# Patient Record
Sex: FEMALE
[De-identification: names, ages, dates, MRNs, and addresses within clinical notes are randomized per-mention and may not be internally consistent; named-entity substitution may affect disease eponyms.]

---

## 2020-01-25 ENCOUNTER — NURSE TRIAGE (OUTPATIENT)
Dept: OTHER | Facility: CLINIC | Age: 65
End: 2020-01-25

## 2020-03-12 ENCOUNTER — NURSE TRIAGE (OUTPATIENT)
Dept: OTHER | Facility: CLINIC | Age: 65
End: 2020-03-12

## 2020-03-12 NOTE — TELEPHONE ENCOUNTER
Reason for Disposition   [1] No CORONAVIRUS EXPOSURE BUT [2] questions about    Protocols used: CORONAVIRUS (2019-NCOV) EXPOSURE-ADULT-AH    Patient has no symptoms, but works as  and comes in contact with many people. She has hx of CHF, A fib, asthmatic bronchitis and a pace maker. She is wanting to discuss if she should avoid working. Discussed she might want to check with her PCP since he knows her best.  But people with underlying medical conditions, the elderly and very young should take every precaution to avoid crowds and ill people.   Good hand hygiene and cough etiquette are a must.

## 2021-07-18 ENCOUNTER — NURSE TRIAGE (OUTPATIENT)
Dept: OTHER | Facility: CLINIC | Age: 66
End: 2021-07-18

## 2021-07-18 NOTE — TELEPHONE ENCOUNTER
Brief description of triage: at 11:45 a.m. patient states that her glucose was 493, she took 13 units of insulin and 23 minutes later her glucose 373, the last reading was at 13:33 361    Triage indicates for patient to call PCP now, patient states that she will do that. Care advice provided, patient verbalizes understanding; denies any other questions or concerns; instructed to call back for any new or worsening symptoms. This triage is a result of a call to [address] [place] [address]. Please do not respond to the triage nurse through this encounter. Any subsequent communication should be directly with the patient. Reason for Disposition   [1] Blood glucose > 300 mg/dL (16.7 mmol/L) AND [2] two or more times in a row    Answer Assessment - Initial Assessment Questions  1. BLOOD GLUCOSE: \"What is your blood glucose level? \"       See above note    2. ONSET: \"When did you check the blood glucose? \"      See above note    3. USUAL RANGE: \"What is your glucose level usually? \" (e.g., usual fasting morning value, usual evening value)      States that it fluctuates, she usually get readings of 120 to 200, sometimes fluctuates over 300    4. KETONES: \"Do you check for ketones (urine or blood test strips)? \" If yes, ask: \"What does the test show now? \"       No    5. TYPE 1 or 2:  \"Do you know what type of diabetes you have? \"  (e.g., Type 1, Type 2, Gestational; doesn't know)       Type 2    6. INSULIN: \"Do you take insulin? \" \"What type of insulin(s) do you use? What is the mode of delivery? (syringe, pen; injection or pump)? \"       Kenalog, sliding scale, pen    7. DIABETES PILLS: \"Do you take any pills for your diabetes? \" If yes, ask: \"Have you missed taking any pills recently? \"      No    8. OTHER SYMPTOMS: \"Do you have any symptoms? \" (e.g., fever, frequent urination, difficulty breathing, dizziness, weakness, vomiting)      Weakness    9. PREGNANCY: \"Is there any chance you are pregnant? \" \"When was your

## 2023-07-28 ENCOUNTER — TRANSCRIBE ORDERS (OUTPATIENT)
Dept: ADMINISTRATIVE | Age: 68
End: 2023-07-28

## 2023-07-28 DIAGNOSIS — R10.9 ACUTE ABDOMINAL PAIN: Primary | ICD-10-CM

## 2023-08-23 ENCOUNTER — HOSPITAL ENCOUNTER (OUTPATIENT)
Dept: CT IMAGING | Age: 68
Discharge: HOME | End: 2023-08-23
Payer: COMMERCIAL

## 2023-08-23 DIAGNOSIS — R10.9 ACUTE ABDOMINAL PAIN: ICD-10-CM

## 2023-08-23 PROCEDURE — 74176 CT ABD & PELVIS W/O CONTRAST: CPT

## 2023-09-13 ENCOUNTER — TRANSCRIBE ORDERS (OUTPATIENT)
Dept: ADMINISTRATIVE | Age: 68
End: 2023-09-13

## 2023-09-13 DIAGNOSIS — N28.89 RENAL MASS: Primary | ICD-10-CM

## 2023-09-27 ENCOUNTER — HOSPITAL ENCOUNTER (INPATIENT)
Age: 68
LOS: 4 days | Discharge: HOME | DRG: 689 | End: 2023-10-02
Attending: EMERGENCY MEDICINE | Admitting: STUDENT IN AN ORGANIZED HEALTH CARE EDUCATION/TRAINING PROGRAM
Payer: MEDICARE

## 2023-09-27 DIAGNOSIS — Z86.19 HISTORY OF ESBL KLEBSIELLA PNEUMONIAE INFECTION: ICD-10-CM

## 2023-09-27 DIAGNOSIS — R10.84 GENERALIZED ABDOMINAL PAIN: ICD-10-CM

## 2023-09-27 DIAGNOSIS — M54.50 BILATERAL LOW BACK PAIN WITHOUT SCIATICA, UNSPECIFIED CHRONICITY: Primary | ICD-10-CM

## 2023-09-27 PROCEDURE — 99285 EMERGENCY DEPT VISIT HI MDM: CPT

## 2023-09-27 PROCEDURE — 96375 TX/PRO/DX INJ NEW DRUG ADDON: CPT

## 2023-09-27 PROCEDURE — 96374 THER/PROPH/DIAG INJ IV PUSH: CPT

## 2023-09-28 ENCOUNTER — APPOINTMENT (OUTPATIENT)
Dept: CT IMAGING | Age: 68
DRG: 689 | End: 2023-09-28
Payer: MEDICARE

## 2023-09-28 ENCOUNTER — APPOINTMENT (OUTPATIENT)
Dept: GENERAL RADIOLOGY | Age: 68
DRG: 689 | End: 2023-09-28
Payer: MEDICARE

## 2023-09-28 PROBLEM — R10.9 ABDOMINAL PAIN: Status: ACTIVE | Noted: 2023-09-28

## 2023-09-28 LAB
ALBUMIN SERPL-MCNC: 4 G/DL (ref 3.4–5)
ALBUMIN/GLOB SERPL: 1.8 {RATIO} (ref 1.1–2.2)
ALP SERPL-CCNC: 176 U/L (ref 40–129)
ALT SERPL-CCNC: 7 U/L (ref 10–40)
ANION GAP SERPL CALCULATED.3IONS-SCNC: 11 MMOL/L (ref 3–16)
ANION GAP SERPL CALCULATED.3IONS-SCNC: 8 MMOL/L (ref 3–16)
AST SERPL-CCNC: 14 U/L (ref 15–37)
BACTERIA URNS QL MICRO: ABNORMAL /HPF
BASOPHILS # BLD: 0 K/UL (ref 0–0.2)
BASOPHILS # BLD: 0 K/UL (ref 0–0.2)
BASOPHILS NFR BLD: 0.6 %
BASOPHILS NFR BLD: 0.7 %
BILIRUB SERPL-MCNC: 0.8 MG/DL (ref 0–1)
BILIRUB UR QL STRIP.AUTO: ABNORMAL
BUN SERPL-MCNC: 16 MG/DL (ref 7–20)
BUN SERPL-MCNC: 17 MG/DL (ref 7–20)
CALCIUM SERPL-MCNC: 9.3 MG/DL (ref 8.3–10.6)
CALCIUM SERPL-MCNC: 9.9 MG/DL (ref 8.3–10.6)
CHLORIDE SERPL-SCNC: 93 MMOL/L (ref 99–110)
CHLORIDE SERPL-SCNC: 95 MMOL/L (ref 99–110)
CLARITY UR: ABNORMAL
CO2 SERPL-SCNC: 33 MMOL/L (ref 21–32)
CO2 SERPL-SCNC: 37 MMOL/L (ref 21–32)
COLOR UR: ABNORMAL
CREAT SERPL-MCNC: 2.5 MG/DL (ref 0.6–1.2)
CREAT SERPL-MCNC: 2.9 MG/DL (ref 0.6–1.2)
CRP SERPL-MCNC: 7.6 MG/L (ref 0–5.1)
DEPRECATED RDW RBC AUTO: 18.6 % (ref 12.4–15.4)
DEPRECATED RDW RBC AUTO: 18.6 % (ref 12.4–15.4)
EOSINOPHIL # BLD: 0.1 K/UL (ref 0–0.6)
EOSINOPHIL # BLD: 0.1 K/UL (ref 0–0.6)
EOSINOPHIL NFR BLD: 1.2 %
EOSINOPHIL NFR BLD: 2.2 %
EPI CELLS #/AREA URNS AUTO: 16 /HPF (ref 0–5)
ERYTHROCYTE [SEDIMENTATION RATE] IN BLOOD BY WESTERGREN METHOD: 4 MM/HR (ref 0–30)
GFR SERPLBLD CREATININE-BSD FMLA CKD-EPI: 17 ML/MIN/{1.73_M2}
GFR SERPLBLD CREATININE-BSD FMLA CKD-EPI: 20 ML/MIN/{1.73_M2}
GLUCOSE BLD-MCNC: 104 MG/DL (ref 70–99)
GLUCOSE BLD-MCNC: 111 MG/DL (ref 70–99)
GLUCOSE BLD-MCNC: 128 MG/DL (ref 70–99)
GLUCOSE BLD-MCNC: 155 MG/DL (ref 70–99)
GLUCOSE BLD-MCNC: 181 MG/DL (ref 70–99)
GLUCOSE SERPL-MCNC: 129 MG/DL (ref 70–99)
GLUCOSE SERPL-MCNC: 142 MG/DL (ref 70–99)
GLUCOSE UR STRIP.AUTO-MCNC: NEGATIVE MG/DL
HCT VFR BLD AUTO: 26.3 % (ref 36–48)
HCT VFR BLD AUTO: 26.8 % (ref 36–48)
HGB BLD-MCNC: 8.2 G/DL (ref 12–16)
HGB BLD-MCNC: 8.5 G/DL (ref 12–16)
HGB UR QL STRIP.AUTO: ABNORMAL
HYALINE CASTS #/AREA URNS AUTO: 10 /LPF (ref 0–8)
KETONES UR STRIP.AUTO-MCNC: ABNORMAL MG/DL
LEUKOCYTE ESTERASE UR QL STRIP.AUTO: ABNORMAL
LIPASE SERPL-CCNC: 11 U/L (ref 13–60)
LYMPHOCYTES # BLD: 0.5 K/UL (ref 1–5.1)
LYMPHOCYTES # BLD: 0.9 K/UL (ref 1–5.1)
LYMPHOCYTES NFR BLD: 10.4 %
LYMPHOCYTES NFR BLD: 20.8 %
MCH RBC QN AUTO: 30.2 PG (ref 26–34)
MCH RBC QN AUTO: 30.3 PG (ref 26–34)
MCHC RBC AUTO-ENTMCNC: 31.3 G/DL (ref 31–36)
MCHC RBC AUTO-ENTMCNC: 31.5 G/DL (ref 31–36)
MCV RBC AUTO: 96 FL (ref 80–100)
MCV RBC AUTO: 96.7 FL (ref 80–100)
MONOCYTES # BLD: 0.3 K/UL (ref 0–1.3)
MONOCYTES # BLD: 0.3 K/UL (ref 0–1.3)
MONOCYTES NFR BLD: 5.3 %
MONOCYTES NFR BLD: 7.2 %
NEUTROPHILS # BLD: 3.1 K/UL (ref 1.7–7.7)
NEUTROPHILS # BLD: 4 K/UL (ref 1.7–7.7)
NEUTROPHILS NFR BLD: 69.1 %
NEUTROPHILS NFR BLD: 82.5 %
NITRITE UR QL STRIP.AUTO: NEGATIVE
PERFORMED ON: ABNORMAL
PH UR STRIP.AUTO: 5 [PH] (ref 5–8)
PLATELET # BLD AUTO: 149 K/UL (ref 135–450)
PLATELET # BLD AUTO: 154 K/UL (ref 135–450)
PMV BLD AUTO: 7.7 FL (ref 5–10.5)
PMV BLD AUTO: 7.9 FL (ref 5–10.5)
POTASSIUM SERPL-SCNC: 4.5 MMOL/L (ref 3.5–5.1)
POTASSIUM SERPL-SCNC: 5.1 MMOL/L (ref 3.5–5.1)
PROT SERPL-MCNC: 6.2 G/DL (ref 6.4–8.2)
PROT UR STRIP.AUTO-MCNC: 300 MG/DL
RBC # BLD AUTO: 2.72 M/UL (ref 4–5.2)
RBC # BLD AUTO: 2.79 M/UL (ref 4–5.2)
RBC CLUMPS #/AREA URNS AUTO: 83 /HPF (ref 0–4)
SODIUM SERPL-SCNC: 137 MMOL/L (ref 136–145)
SODIUM SERPL-SCNC: 140 MMOL/L (ref 136–145)
SP GR UR STRIP.AUTO: 1.02 (ref 1–1.03)
TROPONIN, HIGH SENSITIVITY: 128 NG/L (ref 0–14)
TROPONIN, HIGH SENSITIVITY: 130 NG/L (ref 0–14)
TSH SERPL DL<=0.005 MIU/L-ACNC: 2.7 UIU/ML (ref 0.27–4.2)
UA COMPLETE W REFLEX CULTURE PNL UR: YES
UA DIPSTICK W REFLEX MICRO PNL UR: YES
URN SPEC COLLECT METH UR: ABNORMAL
UROBILINOGEN UR STRIP-ACNC: 1 E.U./DL
WBC # BLD AUTO: 4.5 K/UL (ref 4–11)
WBC # BLD AUTO: 4.8 K/UL (ref 4–11)
WBC #/AREA URNS AUTO: 1403 /HPF (ref 0–5)

## 2023-09-28 PROCEDURE — 6370000000 HC RX 637 (ALT 250 FOR IP): Performed by: NURSE PRACTITIONER

## 2023-09-28 PROCEDURE — 2580000003 HC RX 258: Performed by: EMERGENCY MEDICINE

## 2023-09-28 PROCEDURE — 85652 RBC SED RATE AUTOMATED: CPT

## 2023-09-28 PROCEDURE — 6370000000 HC RX 637 (ALT 250 FOR IP): Performed by: STUDENT IN AN ORGANIZED HEALTH CARE EDUCATION/TRAINING PROGRAM

## 2023-09-28 PROCEDURE — 36415 COLL VENOUS BLD VENIPUNCTURE: CPT

## 2023-09-28 PROCEDURE — 84443 ASSAY THYROID STIM HORMONE: CPT

## 2023-09-28 PROCEDURE — 84484 ASSAY OF TROPONIN QUANT: CPT

## 2023-09-28 PROCEDURE — 02HV33Z INSERTION OF INFUSION DEVICE INTO SUPERIOR VENA CAVA, PERCUTANEOUS APPROACH: ICD-10-PCS | Performed by: STUDENT IN AN ORGANIZED HEALTH CARE EDUCATION/TRAINING PROGRAM

## 2023-09-28 PROCEDURE — 6360000002 HC RX W HCPCS: Performed by: STUDENT IN AN ORGANIZED HEALTH CARE EDUCATION/TRAINING PROGRAM

## 2023-09-28 PROCEDURE — 80053 COMPREHEN METABOLIC PANEL: CPT

## 2023-09-28 PROCEDURE — 93005 ELECTROCARDIOGRAM TRACING: CPT | Performed by: EMERGENCY MEDICINE

## 2023-09-28 PROCEDURE — 81001 URINALYSIS AUTO W/SCOPE: CPT

## 2023-09-28 PROCEDURE — 87086 URINE CULTURE/COLONY COUNT: CPT

## 2023-09-28 PROCEDURE — 87040 BLOOD CULTURE FOR BACTERIA: CPT

## 2023-09-28 PROCEDURE — 6360000002 HC RX W HCPCS: Performed by: EMERGENCY MEDICINE

## 2023-09-28 PROCEDURE — 1200000000 HC SEMI PRIVATE

## 2023-09-28 PROCEDURE — 94760 N-INVAS EAR/PLS OXIMETRY 1: CPT

## 2023-09-28 PROCEDURE — 94640 AIRWAY INHALATION TREATMENT: CPT

## 2023-09-28 PROCEDURE — 85025 COMPLETE CBC W/AUTO DIFF WBC: CPT

## 2023-09-28 PROCEDURE — 86140 C-REACTIVE PROTEIN: CPT

## 2023-09-28 PROCEDURE — 2700000000 HC OXYGEN THERAPY PER DAY

## 2023-09-28 PROCEDURE — 2580000003 HC RX 258: Performed by: STUDENT IN AN ORGANIZED HEALTH CARE EDUCATION/TRAINING PROGRAM

## 2023-09-28 PROCEDURE — 71045 X-RAY EXAM CHEST 1 VIEW: CPT

## 2023-09-28 PROCEDURE — 74176 CT ABD & PELVIS W/O CONTRAST: CPT

## 2023-09-28 PROCEDURE — 83690 ASSAY OF LIPASE: CPT

## 2023-09-28 RX ORDER — OXYCODONE HYDROCHLORIDE AND ACETAMINOPHEN 5; 325 MG/1; MG/1
1 TABLET ORAL EVERY 8 HOURS PRN
Status: DISCONTINUED | OUTPATIENT
Start: 2023-09-28 | End: 2023-10-02 | Stop reason: HOSPADM

## 2023-09-28 RX ORDER — IPRATROPIUM BROMIDE AND ALBUTEROL SULFATE 2.5; .5 MG/3ML; MG/3ML
1 SOLUTION RESPIRATORY (INHALATION) 2 TIMES DAILY
Status: DISCONTINUED | OUTPATIENT
Start: 2023-09-28 | End: 2023-10-02 | Stop reason: HOSPADM

## 2023-09-28 RX ORDER — ACETAMINOPHEN 325 MG/1
650 TABLET ORAL EVERY 6 HOURS PRN
Status: DISCONTINUED | OUTPATIENT
Start: 2023-09-28 | End: 2023-10-02 | Stop reason: HOSPADM

## 2023-09-28 RX ORDER — ALLOPURINOL 100 MG/1
50 TABLET ORAL EVERY OTHER DAY
Status: DISCONTINUED | OUTPATIENT
Start: 2023-09-30 | End: 2023-10-02 | Stop reason: HOSPADM

## 2023-09-28 RX ORDER — FENTANYL CITRATE 50 UG/ML
25 INJECTION, SOLUTION INTRAMUSCULAR; INTRAVENOUS ONCE
Status: COMPLETED | OUTPATIENT
Start: 2023-09-28 | End: 2023-09-28

## 2023-09-28 RX ORDER — FUROSEMIDE 10 MG/ML
40 INJECTION INTRAMUSCULAR; INTRAVENOUS ONCE
Status: COMPLETED | OUTPATIENT
Start: 2023-09-28 | End: 2023-09-28

## 2023-09-28 RX ORDER — ATORVASTATIN CALCIUM 40 MG/1
40 TABLET, FILM COATED ORAL NIGHTLY
Status: DISCONTINUED | OUTPATIENT
Start: 2023-09-28 | End: 2023-10-02 | Stop reason: HOSPADM

## 2023-09-28 RX ORDER — ONDANSETRON 2 MG/ML
4 INJECTION INTRAMUSCULAR; INTRAVENOUS EVERY 6 HOURS PRN
Status: DISCONTINUED | OUTPATIENT
Start: 2023-09-28 | End: 2023-10-02 | Stop reason: HOSPADM

## 2023-09-28 RX ORDER — SODIUM CHLORIDE 9 MG/ML
INJECTION, SOLUTION INTRAVENOUS PRN
Status: DISCONTINUED | OUTPATIENT
Start: 2023-09-28 | End: 2023-10-02 | Stop reason: HOSPADM

## 2023-09-28 RX ORDER — ALBUTEROL SULFATE 90 UG/1
2 AEROSOL, METERED RESPIRATORY (INHALATION) EVERY 6 HOURS PRN
Status: DISCONTINUED | OUTPATIENT
Start: 2023-09-28 | End: 2023-10-02 | Stop reason: HOSPADM

## 2023-09-28 RX ORDER — HEPARIN SODIUM 5000 [USP'U]/ML
5000 INJECTION, SOLUTION INTRAVENOUS; SUBCUTANEOUS EVERY 8 HOURS SCHEDULED
Status: DISCONTINUED | OUTPATIENT
Start: 2023-09-28 | End: 2023-10-02 | Stop reason: HOSPADM

## 2023-09-28 RX ORDER — LACTULOSE 10 G/15ML
20 SOLUTION ORAL 3 TIMES DAILY
Status: COMPLETED | OUTPATIENT
Start: 2023-09-28 | End: 2023-09-28

## 2023-09-28 RX ORDER — POLYETHYLENE GLYCOL 3350 17 G/17G
17 POWDER, FOR SOLUTION ORAL DAILY PRN
Status: DISCONTINUED | OUTPATIENT
Start: 2023-09-28 | End: 2023-09-30

## 2023-09-28 RX ORDER — INSULIN LISPRO 100 [IU]/ML
0-8 INJECTION, SOLUTION INTRAVENOUS; SUBCUTANEOUS
Status: DISCONTINUED | OUTPATIENT
Start: 2023-09-28 | End: 2023-10-02 | Stop reason: HOSPADM

## 2023-09-28 RX ORDER — SODIUM CHLORIDE 0.9 % (FLUSH) 0.9 %
5-40 SYRINGE (ML) INJECTION PRN
Status: DISCONTINUED | OUTPATIENT
Start: 2023-09-28 | End: 2023-10-02 | Stop reason: HOSPADM

## 2023-09-28 RX ORDER — GLUCAGON 1 MG/ML
1 KIT INJECTION PRN
Status: DISCONTINUED | OUTPATIENT
Start: 2023-09-28 | End: 2023-10-02 | Stop reason: HOSPADM

## 2023-09-28 RX ORDER — SODIUM CHLORIDE 0.9 % (FLUSH) 0.9 %
5-40 SYRINGE (ML) INJECTION EVERY 12 HOURS SCHEDULED
Status: DISCONTINUED | OUTPATIENT
Start: 2023-09-28 | End: 2023-10-02 | Stop reason: HOSPADM

## 2023-09-28 RX ORDER — OXYCODONE HYDROCHLORIDE AND ACETAMINOPHEN 5; 325 MG/1; MG/1
1 TABLET ORAL 2 TIMES DAILY
COMMUNITY

## 2023-09-28 RX ORDER — METOLAZONE 2.5 MG/1
5 TABLET ORAL
Status: DISCONTINUED | OUTPATIENT
Start: 2023-09-29 | End: 2023-10-02 | Stop reason: HOSPADM

## 2023-09-28 RX ORDER — ASPIRIN 81 MG/1
81 TABLET, CHEWABLE ORAL DAILY
Status: DISCONTINUED | OUTPATIENT
Start: 2023-09-28 | End: 2023-10-02 | Stop reason: HOSPADM

## 2023-09-28 RX ORDER — ALLOPURINOL 300 MG/1
300 TABLET ORAL DAILY
Status: DISCONTINUED | OUTPATIENT
Start: 2023-09-28 | End: 2023-09-28

## 2023-09-28 RX ORDER — ONDANSETRON 2 MG/ML
4 INJECTION INTRAMUSCULAR; INTRAVENOUS EVERY 6 HOURS PRN
Status: DISCONTINUED | OUTPATIENT
Start: 2023-09-28 | End: 2023-09-28 | Stop reason: HOSPADM

## 2023-09-28 RX ORDER — LANOLIN ALCOHOL/MO/W.PET/CERES
3 CREAM (GRAM) TOPICAL NIGHTLY PRN
Status: DISCONTINUED | OUTPATIENT
Start: 2023-09-28 | End: 2023-10-02 | Stop reason: HOSPADM

## 2023-09-28 RX ORDER — MELOXICAM 7.5 MG/1
15 TABLET ORAL DAILY
COMMUNITY
Start: 2023-09-11

## 2023-09-28 RX ORDER — ALBUTEROL SULFATE 2.5 MG/3ML
SOLUTION RESPIRATORY (INHALATION)
COMMUNITY
Start: 2023-09-21

## 2023-09-28 RX ORDER — ALPRAZOLAM 0.25 MG/1
0.25 TABLET ORAL 3 TIMES DAILY PRN
COMMUNITY
Start: 2023-09-05

## 2023-09-28 RX ORDER — LEVOTHYROXINE SODIUM 0.1 MG/1
100 TABLET ORAL EVERY MORNING
COMMUNITY
Start: 2023-09-23

## 2023-09-28 RX ORDER — LINACLOTIDE 290 UG/1
290 CAPSULE, GELATIN COATED ORAL DAILY
COMMUNITY
Start: 2023-09-06

## 2023-09-28 RX ORDER — DILTIAZEM HYDROCHLORIDE 60 MG/1
2 TABLET, FILM COATED ORAL 2 TIMES DAILY
COMMUNITY
Start: 2023-08-24

## 2023-09-28 RX ORDER — GABAPENTIN 100 MG/1
200 CAPSULE ORAL NIGHTLY
Status: DISCONTINUED | OUTPATIENT
Start: 2023-09-28 | End: 2023-10-02 | Stop reason: HOSPADM

## 2023-09-28 RX ORDER — LISINOPRIL 5 MG/1
2.5 TABLET ORAL DAILY
Status: DISCONTINUED | OUTPATIENT
Start: 2023-09-28 | End: 2023-10-02 | Stop reason: HOSPADM

## 2023-09-28 RX ORDER — DEXTROSE MONOHYDRATE 100 MG/ML
INJECTION, SOLUTION INTRAVENOUS CONTINUOUS PRN
Status: DISCONTINUED | OUTPATIENT
Start: 2023-09-28 | End: 2023-10-02 | Stop reason: HOSPADM

## 2023-09-28 RX ORDER — PANTOPRAZOLE SODIUM 40 MG/1
40 TABLET, DELAYED RELEASE ORAL
Status: DISCONTINUED | OUTPATIENT
Start: 2023-09-28 | End: 2023-10-02 | Stop reason: HOSPADM

## 2023-09-28 RX ORDER — ONDANSETRON 4 MG/1
4 TABLET, ORALLY DISINTEGRATING ORAL EVERY 8 HOURS PRN
Status: DISCONTINUED | OUTPATIENT
Start: 2023-09-28 | End: 2023-10-02 | Stop reason: HOSPADM

## 2023-09-28 RX ORDER — INSULIN LISPRO 100 [IU]/ML
0-4 INJECTION, SOLUTION INTRAVENOUS; SUBCUTANEOUS NIGHTLY
Status: DISCONTINUED | OUTPATIENT
Start: 2023-09-28 | End: 2023-10-02 | Stop reason: HOSPADM

## 2023-09-28 RX ORDER — ACETAMINOPHEN 650 MG/1
650 SUPPOSITORY RECTAL EVERY 6 HOURS PRN
Status: DISCONTINUED | OUTPATIENT
Start: 2023-09-28 | End: 2023-10-02 | Stop reason: HOSPADM

## 2023-09-28 RX ORDER — ALLOPURINOL 300 MG/1
300 TABLET ORAL DAILY
COMMUNITY
Start: 2023-09-05

## 2023-09-28 RX ORDER — LEVOTHYROXINE SODIUM 0.1 MG/1
100 TABLET ORAL DAILY
Status: DISCONTINUED | OUTPATIENT
Start: 2023-09-28 | End: 2023-10-02 | Stop reason: HOSPADM

## 2023-09-28 RX ADMIN — ONDANSETRON 4 MG: 2 INJECTION INTRAMUSCULAR; INTRAVENOUS at 00:23

## 2023-09-28 RX ADMIN — ASPIRIN 81 MG 81 MG: 81 TABLET ORAL at 09:20

## 2023-09-28 RX ADMIN — LACTULOSE 20 G: 20 SOLUTION ORAL at 09:20

## 2023-09-28 RX ADMIN — MELATONIN TAB 3 MG 3 MG: 3 TAB at 22:31

## 2023-09-28 RX ADMIN — Medication 10 ML: at 21:30

## 2023-09-28 RX ADMIN — METOPROLOL TARTRATE 25 MG: 25 TABLET, FILM COATED ORAL at 09:20

## 2023-09-28 RX ADMIN — GABAPENTIN 200 MG: 100 CAPSULE ORAL at 21:29

## 2023-09-28 RX ADMIN — LEVOTHYROXINE SODIUM 100 MCG: 0.1 TABLET ORAL at 09:20

## 2023-09-28 RX ADMIN — FENTANYL CITRATE 25 MCG: 0.05 INJECTION, SOLUTION INTRAMUSCULAR; INTRAVENOUS at 00:23

## 2023-09-28 RX ADMIN — METOPROLOL TARTRATE 25 MG: 25 TABLET, FILM COATED ORAL at 21:29

## 2023-09-28 RX ADMIN — HEPARIN SODIUM 5000 UNITS: 5000 INJECTION INTRAVENOUS; SUBCUTANEOUS at 05:23

## 2023-09-28 RX ADMIN — LACTULOSE 20 G: 20 SOLUTION ORAL at 13:52

## 2023-09-28 RX ADMIN — OXYCODONE AND ACETAMINOPHEN 1 TABLET: 5; 325 TABLET ORAL at 18:40

## 2023-09-28 RX ADMIN — HEPARIN SODIUM 5000 UNITS: 5000 INJECTION INTRAVENOUS; SUBCUTANEOUS at 21:29

## 2023-09-28 RX ADMIN — HEPARIN SODIUM 5000 UNITS: 5000 INJECTION INTRAVENOUS; SUBCUTANEOUS at 13:53

## 2023-09-28 RX ADMIN — FUROSEMIDE 40 MG: 10 INJECTION, SOLUTION INTRAMUSCULAR; INTRAVENOUS at 01:44

## 2023-09-28 RX ADMIN — Medication 10 ML: at 09:21

## 2023-09-28 RX ADMIN — IPRATROPIUM BROMIDE AND ALBUTEROL SULFATE 1 DOSE: .5; 3 SOLUTION RESPIRATORY (INHALATION) at 20:44

## 2023-09-28 RX ADMIN — MEROPENEM 1000 MG: 1 INJECTION, POWDER, FOR SOLUTION INTRAVENOUS at 02:29

## 2023-09-28 RX ADMIN — ALLOPURINOL 300 MG: 300 TABLET ORAL at 09:20

## 2023-09-28 ASSESSMENT — PAIN SCALES - GENERAL
PAINLEVEL_OUTOF10: 5
PAINLEVEL_OUTOF10: 8
PAINLEVEL_OUTOF10: 3
PAINLEVEL_OUTOF10: 5
PAINLEVEL_OUTOF10: 9
PAINLEVEL_OUTOF10: 8

## 2023-09-28 ASSESSMENT — PAIN DESCRIPTION - LOCATION
LOCATION: BACK;ABDOMEN
LOCATION: BACK

## 2023-09-28 ASSESSMENT — PAIN DESCRIPTION - ORIENTATION: ORIENTATION: RIGHT;LOWER;MID

## 2023-09-28 NOTE — PROGRESS NOTES
Tried straight cath pt 2x. Cash Swift RN was able to get a little urine the second time only about 10mL. Sent down to lab. She was in extreme pain when inserting. While pulling out cath there was a little blood at the end and looked cloudy. Urine was dark yellow.

## 2023-09-28 NOTE — ED NOTES
Patient extremely upset about being in [facility]. Pt states that she has been trying to move to another facility near her family in [facility]. Pt would like to speak to social work if admitted. Pt states that she has been attempting to call social work from the previous rehab place she was at, but that woman hasnt called her back.       Millie Allen RN  09/28/23 [id number]

## 2023-09-28 NOTE — PROGRESS NOTES
09/28/23 1353   RT Protocol   History Pulmonary Disease 0   Respiratory pattern 0   Breath sounds 2   Cough 1   Bronchodilator Assessment Score 3

## 2023-09-28 NOTE — PROGRESS NOTES
Nutrition Note    RECOMMENDATIONS  PO Diet: Increased to 4 carb choices to allow for additional meal options in order to promote adequate po intake  ONS: Ordered Ensure Compact BID d/t lower K+ content    NUTRITION ASSESSMENT   Pt triggered for positive nutrition screen indicating wt loss, poor po intake. Upon visiting, pt reported weighed 330 lb in February, current wt of 244 lb. No prior wt hx in EMR to verify reported wt loss. Reported very poor to no appetite during that same timeframe d/t stress and pain, experiencing early satiety and certain smells offsetting. Would like to receive ONS to offer additional nutrition, dislikes Nepro. RD will order ONS and continue to monitor for adequate po intake. Nutrition Related Findings: Lytes obtained today WNL. LBM 9/27, BS+. +2 non-pitting RLE edema. ESRD on HD. Wounds: None  Nutrition Education:  Education not indicated   Nutrition Goals: PO intake 50% or greater, by next RD assessment     MALNUTRITION ASSESSMENT   Chronic Illness  Malnutrition Status: Insufficient data    NUTRITION DIAGNOSIS   Inadequate oral intake related to early satiety, pain, psychological cause or life stress as evidenced by poor intake prior to admission    CURRENT NUTRITION THERAPIES  ADULT DIET; Regular; 3 carb choices (45 gm/meal); Low Potassium (Less than 3000 mg/day)     PO Intake: Unable to assess   PO Supplement Intake:None Ordered    ANTHROPOMETRICS  Current Height: 5' 7.5\" (171.5 cm)  Current Weight - Scale: 244 lb 14.4 oz (111.1 kg)    Admission weight: 244 lb (110.7 kg)  Ideal Body Weight (IBW): 138 lbs  (63 kg)   Weight Adjustment For: Amputation  % Weight Adjustment : 5.9 - BKA  Adjusted Body Weight (Calculated): 258.4 lb  Adjusted BMI (kg/m2) (Calculated): 39.8       BMI: 37.6    COMPARATIVE STANDARDS  Total Energy Requirements (kcals/day): 1112-3555     Protein (g):         Fluid (mL/day):  5088-0869    The patient will be monitored per nutrition standards of care.

## 2023-09-28 NOTE — PROGRESS NOTES
4 Eyes Skin Assessment     NAME:  Sam Donovan  YOB: 1955  MEDICAL RECORD NUMBER:  [id number]    The patient is being assessed for  Admission    I agree that at least one RN has performed a thorough Head to Toe Skin Assessment on the patient. ALL assessment sites listed below have been assessed. Areas assessed by both nurses:    Head, Face, Ears, Shoulders, Back, Chest, Arms, Elbows, Hands, Sacrum. Buttock, Coccyx, Ischium, Legs. Feet and Heels, and Under Medical Devices         Does the Patient have a Wound?  No noted wound(s)       Ede Prevention initiated by RN: Yes  Wound Care Orders initiated by RN: No    Pressure Injury (Stage 3,4, Unstageable, DTI, NWPT, and Complex wounds) if present, place Wound referral order by RN under : No    New Ostomies, if present place, Ostomy referral order under : No     Nurse 1 eSignature: Electronically signed by Yadiel Alexis RN on 9/28/23 at 4:06 AM EDT    **SHARE this note so that the co-signing nurse can place an eSignature**    Nurse 2 eSignature: Electronically signed by Eliazar Fabian RN on 9/28/23 at 5:39 AM EDT

## 2023-09-28 NOTE — PROGRESS NOTES
Put out a call to [facility] to see if we can get the pts paper work and med list sent over via fax.

## 2023-09-28 NOTE — PROGRESS NOTES
Pt arrived via stretcher with ER nurse. We used the lift to get pt into bed. She was on 3L NC, purewick was in place. She did not seem to be in distress. Admission assessment completed. VSS, scheduled meds given/held per MAR orders. Pt does have left lower leg amputation that was done back in February. The right lower leg is slightly red, warm to touch and swollen, MD came to look and ordering labs. She is a dialysis pt and her schedule is M,W,F. Pt is A&Ox4. Pt has no complaints or needs at this time. Brakes locked, bed in lowest position, bed alarm engaged. All belongings and call light within reach. Will call [facility] in the morning to see if we can get her medication leis and about her prosthetic that was left at her apartment.

## 2023-09-28 NOTE — ED PROVIDER NOTES
[facility]        Pt Name: Hoa Butler  MRN: [id number]  [address] 1955  Date of evaluation: 9/27/2023  Provider: Samantha Benjamin MD  PCP: Michael Adams MD  Note Started: 2:18 AM EDT 9/28/23    CHIEF COMPLAINT       Chief Complaint   Patient presents with    Back Pain     Pt brought by  EMS from [facility]) care. Pt hx renal failure, diabetes, COPD. Pt states that her lower back pain has became worse tonight has been hurting today became worse tonight. Pt states has UTI been treated for 4 days now. Pt states dialysis MWF. On 3L O2 baseline       HISTORY OF PRESENT ILLNESS: 1 or more Elements   History From: Patient/EMS        Hoa Butler is a 70 y.o. female who presents for evaluation of lower back pain, abdominal pain, and dysuria. Patient has a history of COPD and end-stage renal disease. She states she has been receiving dialysis normally. She states she is currently at a skilled nursing facility and is being treated for UTI with oral antibiotics. She is not sure what antibiotic she is currently taking. She reports lower thoracic back pain which is progressively worsened. She denies injury or fall. She states there are no remitting or exacerbating factors. She also reports diffuse abdominal discomfort. Associated fevers nausea or vomiting. She did report having persistent dysuria despite taking oral antibiotics for treatment of UTI. Denies any recent instrumentation. Reviewing patient's medical record patient had a urinalysis which grew ESBL resistant Klebsiella on 1/5/2023. Nursing Notes were all reviewed and agreed with or any disagreements were addressed in the HPI. REVIEW OF SYSTEMS :      Review of Systems    Positives and Pertinent negatives as per HPI. SURGICAL HISTORY   No past surgical history on file.     CURRENTMEDICATIONS       Previous Medications    No medications on file       ALLERGIES

## 2023-09-28 NOTE — CONSULTS
MD Francisca Winters MD Yale Rose, MD                Office: [phone]                      Fax: [phone]          NEPHROLOGY INITIAL CONSULT NOTE:     PATIENT NAME: Hoa Butler  : 1955  MRN: [id number]  REASON FOR CONSULT: I am asked to see this patient in consultation for my opinion regarding management of ESRD. My recommendations will be communicated by way of shared medical record. IMPRESSION / RECOMMENDATION:      Admitted on:  2023  For:  Abdominal pain [R10.9]  Generalized abdominal pain [R10.84]  History of ESBL Klebsiella pneumoniae infection [Z86.19]  Bilateral low back pain without sciatica, unspecified chronicity [M54.50]       1. ESRD on iHD: MWF.   - outpt HD center: [facility]. [facility] [id number] [facility] nephrology  - Access: [facility], follow-up outpatient with her nephrologist for further access plans    - s/p HD Wednesday   - next HD Friday     -Kidney cyst  CT scan-  -\"3. Bilateral renal cysts. There is an isodense nodule in the left kidney  measuring 2.4 cm. I can not exclude the possibility of a renal cell  carcinoma. I would recommend follow-up renal mass protocol CT scan or MRI  for further evaluation. CT scan-today-  - 3 cm cystic mass, in the upper right kidney, with 4 cm hypodensity with multiple hypodensity in left kidney, 3 cm unchanged,  -But patient is concerned about that, likely is benign,   - but patient wants further evaluation, will get urology consult for follow-up. 2. Hypertension/Volume Status:  - BP hypertension-okay control continue current plan:   - Na controlled  - EDW follow-up outpatient records   :     - fluid removal to DW      3. Electrolytes/acid-base: controlled    4. Anemia of renal failure: below goal  - Iron: check   - CHRISTIANO:   with HD    5. BMD:  - Phos, calcium - follow in morning labs-     - follow iPTH outpt      :  Other supportive care :   - Check Recommend correlating with urinalysis and follow-up, if indicated Mild-to-moderate bibasilar pleural effusions with bibasilar atelectasis posteriorly which is less prominent. Moderate 3rd spacing of fluid or cellulitis in the subcutaneous soft tissues along the abdominal wall extending inferiorly which is more prominent. Questionable diffuse mild ileus which is more prominent.           ======================================================================  Please note that this chart entry has been generated using voice recognition software, mainly. So please excuse brevity and/or typos. While every effort and attempts have been made to ensure the accuracy of this automated transcription, some errors may have occurred; and certain words and phrases in transcription may not be entered as intended. However, inadvertent computerized transcription errors may be present. So please contact us if any clarification needed.

## 2023-09-28 NOTE — H&P
V2.0  History and Physical      Name:  Bruce Spivey /Age/Sex: 1955  (74 y.o. female)   MRN & CSN:  [id number] & [id number] Encounter Date/Time: 2023 2:16 AM EDT   Location:  [facility]3302/3302-01 PCP: Randi Granados MD       Hospital Day: 2    Assessment and Plan:   Bruce Spivey is a 70 y.o. female with a pmh of ESRD on dialysis, type 2 diabetes, anemia, status post BKA of left leg, CHF, GERD, obesity, COPD, hypothyroidism, MILE on CPAP who presents with Abdominal pain    Hospital Problems             Last Modified POA    * (Principal) Abdominal pain 2023 Yes       Plan:  UTI:  Abdominal pain:  -Patient presented with chief complaint of abdominal pain and dysuria. Mentions that she has history of ESRD on dialysis. She still makes some urine. Reports that she has been getting treatment for UTI in the nursing home. However, it is not helping her dysuria and abdominal pain. -In the ED, patient was hemodynamically stable. UA was positive for large leukocyte esterase. -CT of abdomen and pelvis was done and showed Diffuse moderate bladder wall thickening which may just be due to poor distension with an air-fluid level superiorly which is more apparent and is  probably is due to recent instrumentation of the bladder lumen.   -Patient has history of ESBL  -Urine culture  -Meropenem      2. Constipation:  - Mentions that she has been feeling constipated. She has history of hemorrhoid and abdominal hernia. She denies having fever, chills, nausea, vomiting. Patient mentions that she has tried Senokot but has not helped her with bowel movement.  -Enema      3. Diabetes mellitus:  -Medium dose sliding scale  -Hypoglycemic protocol    4. ESRD on dialysis:  -Monday, Wednesday, Friday  -Patient mentions that her last dialysis was yesterday. 5.  CHF:  -Not in exacerbation  -Strict in and out  -Daily weight    6. MILE:  -On CPAP    7.  Hypothyroidism:  - On levothyroxine        Disposition:   Current soft tissues along the abdominal wall extending inferiorly which is more prominent. Questionable diffuse mild ileus which is more prominent. This note was likely completed using voice recognition technology and may contain unintended errors.      Electronically signed by Gael Ludwig MD on 9/28/2023 at 6:30 AM

## 2023-09-28 NOTE — CARE COORDINATION
Discharge Planning:     (IVETT) in review of chart, Patient is from [facility]. CM placed call to [facility] [phone], [facility] to confirm residency. CM to follow up as needed. Electronically signed by Sebas Mattson on 9/28/2023 at 8:12 AM     CM spoke to Mauri at [facility] she reports patient came there in April, she is LTC. Patient can return with her medicaid, no pre-cert required, and will assist with SN services if needed.      Electronically signed by Sebas Mattson on 9/28/2023 at 9:13 AM

## 2023-09-28 NOTE — PROGRESS NOTES
Hospitalist Progress Note    Patient seen and examined. H&P reviewed. No changes in physical exam. Patient reports that she has been constipated. She did have a small smear of a BM yesterday. She is concerned about not getting her am nebulizer and she reports left flank pain. Up and eating breakfast. No N/V/D. No acute abd pain today. Labs and imaging reviewed. Updates in H&P as follows:       Imaging:  CT AP: 9/27/2023  IMPRESSION:  Cholelithiasis which is unchanged. Mild hepatosplenomegaly which is unchanged. Perirenal scarring and multiple renal cysts which are unchanged with no hydronephrosis or urinary obstruction. Mild ascites in the abdomen which is less prominent and moderate ascites throughout the pelvis which is slightly increased. Diffuse moderate bladder wall thickening which may just be due to poor distension with an air-fluid level superiorly which is more apparent and is probably is due to recent instrumentation of the bladder lumen. Recommend  correlating with urinalysis and follow-up, if indicated     Mild-to-moderate bibasilar pleural effusions with bibasilar atelectasis posteriorly which is less prominent. Moderate 3rd spacing of fluid or cellulitis in the subcutaneous soft tissues along the abdominal wall extending inferiorly which is more prominent. Questionable diffuse mild ileus which is more prominent. Problem List  Principal Problem:    Abdominal pain  Resolved Problems:    * No resolved hospital problems.  *     Assessment and Plan:    Complicated UTI with left flank pain   - Hx of ESBL   - Urine culture is pending   - Blood cx pending   - Continue IV meropenem  ESRD   - CT AP consistent with fluid overload   - MWF HD   - Consult Nephrology   Constipation   - Enema ordered and not administered   - Add lactulose x 2   MILE   - CPAP   Hypothyroidism   - Check TSH   - Continue levothyroxine  Chronic CHF   - Appears fluid up, but compensated   - Fluid

## 2023-09-29 ENCOUNTER — APPOINTMENT (OUTPATIENT)
Dept: ULTRASOUND IMAGING | Age: 68
DRG: 689 | End: 2023-09-29
Payer: MEDICARE

## 2023-09-29 ENCOUNTER — APPOINTMENT (OUTPATIENT)
Dept: GENERAL RADIOLOGY | Age: 68
DRG: 689 | End: 2023-09-29
Payer: MEDICARE

## 2023-09-29 LAB
ANION GAP SERPL CALCULATED.3IONS-SCNC: 7 MMOL/L (ref 3–16)
BACTERIA UR CULT: NORMAL
BASOPHILS # BLD: 0.1 K/UL (ref 0–0.2)
BASOPHILS NFR BLD: 1.1 %
BUN SERPL-MCNC: 24 MG/DL (ref 7–20)
CALCIUM SERPL-MCNC: 9.6 MG/DL (ref 8.3–10.6)
CHLORIDE SERPL-SCNC: 96 MMOL/L (ref 99–110)
CO2 SERPL-SCNC: 35 MMOL/L (ref 21–32)
CREAT SERPL-MCNC: 3.7 MG/DL (ref 0.6–1.2)
DEPRECATED RDW RBC AUTO: 19.3 % (ref 12.4–15.4)
EKG ATRIAL RATE: 71 BPM
EKG DIAGNOSIS: NORMAL
EKG P-R INTERVAL: 278 MS
EKG Q-T INTERVAL: 480 MS
EKG QRS DURATION: 174 MS
EKG QTC CALCULATION (BAZETT): 521 MS
EKG R AXIS: -79 DEGREES
EKG T AXIS: 112 DEGREES
EKG VENTRICULAR RATE: 71 BPM
EOSINOPHIL # BLD: 0.2 K/UL (ref 0–0.6)
EOSINOPHIL NFR BLD: 4.4 %
GFR SERPLBLD CREATININE-BSD FMLA CKD-EPI: 13 ML/MIN/{1.73_M2}
GLUCOSE BLD-MCNC: 102 MG/DL (ref 70–99)
GLUCOSE BLD-MCNC: 133 MG/DL (ref 70–99)
GLUCOSE BLD-MCNC: 146 MG/DL (ref 70–99)
GLUCOSE SERPL-MCNC: 105 MG/DL (ref 70–99)
HCT VFR BLD AUTO: 27.2 % (ref 36–48)
HGB BLD-MCNC: 8.5 G/DL (ref 12–16)
LYMPHOCYTES # BLD: 0.6 K/UL (ref 1–5.1)
LYMPHOCYTES NFR BLD: 12.8 %
MCH RBC QN AUTO: 30.3 PG (ref 26–34)
MCHC RBC AUTO-ENTMCNC: 31.4 G/DL (ref 31–36)
MCV RBC AUTO: 96.6 FL (ref 80–100)
MONOCYTES # BLD: 0.2 K/UL (ref 0–1.3)
MONOCYTES NFR BLD: 5 %
NEUTROPHILS # BLD: 3.6 K/UL (ref 1.7–7.7)
NEUTROPHILS NFR BLD: 76.7 %
PERFORMED ON: ABNORMAL
PLATELET # BLD AUTO: 146 K/UL (ref 135–450)
PMV BLD AUTO: 7.9 FL (ref 5–10.5)
POTASSIUM SERPL-SCNC: 5 MMOL/L (ref 3.5–5.1)
RBC # BLD AUTO: 2.81 M/UL (ref 4–5.2)
SODIUM SERPL-SCNC: 138 MMOL/L (ref 136–145)
WBC # BLD AUTO: 4.7 K/UL (ref 4–11)

## 2023-09-29 PROCEDURE — 76770 US EXAM ABDO BACK WALL COMP: CPT

## 2023-09-29 PROCEDURE — 94761 N-INVAS EAR/PLS OXIMETRY MLT: CPT

## 2023-09-29 PROCEDURE — 74018 RADEX ABDOMEN 1 VIEW: CPT

## 2023-09-29 PROCEDURE — 94640 AIRWAY INHALATION TREATMENT: CPT

## 2023-09-29 PROCEDURE — 93010 ELECTROCARDIOGRAM REPORT: CPT | Performed by: INTERNAL MEDICINE

## 2023-09-29 PROCEDURE — 2580000003 HC RX 258: Performed by: INTERNAL MEDICINE

## 2023-09-29 PROCEDURE — 2700000000 HC OXYGEN THERAPY PER DAY

## 2023-09-29 PROCEDURE — 85025 COMPLETE CBC W/AUTO DIFF WBC: CPT

## 2023-09-29 PROCEDURE — 80048 BASIC METABOLIC PNL TOTAL CA: CPT

## 2023-09-29 PROCEDURE — 90935 HEMODIALYSIS ONE EVALUATION: CPT

## 2023-09-29 PROCEDURE — 36415 COLL VENOUS BLD VENIPUNCTURE: CPT

## 2023-09-29 PROCEDURE — 6370000000 HC RX 637 (ALT 250 FOR IP): Performed by: NURSE PRACTITIONER

## 2023-09-29 PROCEDURE — 6360000002 HC RX W HCPCS: Performed by: INTERNAL MEDICINE

## 2023-09-29 PROCEDURE — 6370000000 HC RX 637 (ALT 250 FOR IP): Performed by: STUDENT IN AN ORGANIZED HEALTH CARE EDUCATION/TRAINING PROGRAM

## 2023-09-29 PROCEDURE — 1200000000 HC SEMI PRIVATE

## 2023-09-29 PROCEDURE — 2580000003 HC RX 258: Performed by: STUDENT IN AN ORGANIZED HEALTH CARE EDUCATION/TRAINING PROGRAM

## 2023-09-29 PROCEDURE — 5A1D70Z PERFORMANCE OF URINARY FILTRATION, INTERMITTENT, LESS THAN 6 HOURS PER DAY: ICD-10-PCS | Performed by: STUDENT IN AN ORGANIZED HEALTH CARE EDUCATION/TRAINING PROGRAM

## 2023-09-29 PROCEDURE — 6360000002 HC RX W HCPCS: Performed by: STUDENT IN AN ORGANIZED HEALTH CARE EDUCATION/TRAINING PROGRAM

## 2023-09-29 RX ADMIN — IPRATROPIUM BROMIDE AND ALBUTEROL SULFATE 1 DOSE: .5; 3 SOLUTION RESPIRATORY (INHALATION) at 07:29

## 2023-09-29 RX ADMIN — HEPARIN SODIUM 5000 UNITS: 5000 INJECTION INTRAVENOUS; SUBCUTANEOUS at 06:24

## 2023-09-29 RX ADMIN — LISINOPRIL 2.5 MG: 5 TABLET ORAL at 12:04

## 2023-09-29 RX ADMIN — OXYCODONE AND ACETAMINOPHEN 1 TABLET: 5; 325 TABLET ORAL at 23:21

## 2023-09-29 RX ADMIN — GABAPENTIN 200 MG: 100 CAPSULE ORAL at 22:02

## 2023-09-29 RX ADMIN — Medication 10 ML: at 22:05

## 2023-09-29 RX ADMIN — ATORVASTATIN CALCIUM 40 MG: 40 TABLET, FILM COATED ORAL at 22:02

## 2023-09-29 RX ADMIN — HEPARIN SODIUM 5000 UNITS: 5000 INJECTION INTRAVENOUS; SUBCUTANEOUS at 22:02

## 2023-09-29 RX ADMIN — METOPROLOL TARTRATE 25 MG: 25 TABLET, FILM COATED ORAL at 22:02

## 2023-09-29 RX ADMIN — LEVOTHYROXINE SODIUM 100 MCG: 0.1 TABLET ORAL at 06:21

## 2023-09-29 RX ADMIN — MEROPENEM 1000 MG: 1 INJECTION, POWDER, FOR SOLUTION INTRAVENOUS at 02:37

## 2023-09-29 RX ADMIN — ASPIRIN 81 MG 81 MG: 81 TABLET ORAL at 12:04

## 2023-09-29 RX ADMIN — METOLAZONE 5 MG: 2.5 TABLET ORAL at 22:02

## 2023-09-29 RX ADMIN — IPRATROPIUM BROMIDE AND ALBUTEROL SULFATE 1 DOSE: .5; 3 SOLUTION RESPIRATORY (INHALATION) at 21:37

## 2023-09-29 RX ADMIN — METOPROLOL TARTRATE 25 MG: 25 TABLET, FILM COATED ORAL at 12:04

## 2023-09-29 RX ADMIN — Medication 10 ML: at 12:36

## 2023-09-29 RX ADMIN — MELATONIN TAB 3 MG 3 MG: 3 TAB at 23:21

## 2023-09-29 RX ADMIN — PANTOPRAZOLE SODIUM 40 MG: 40 TABLET, DELAYED RELEASE ORAL at 06:21

## 2023-09-29 RX ADMIN — SODIUM CHLORIDE: 9 INJECTION, SOLUTION INTRAVENOUS at 02:36

## 2023-09-29 RX ADMIN — HEPARIN SODIUM 5000 UNITS: 5000 INJECTION INTRAVENOUS; SUBCUTANEOUS at 16:02

## 2023-09-29 RX ADMIN — OXYCODONE AND ACETAMINOPHEN 1 TABLET: 5; 325 TABLET ORAL at 12:13

## 2023-09-29 RX ADMIN — OXYCODONE AND ACETAMINOPHEN 1 TABLET: 5; 325 TABLET ORAL at 02:39

## 2023-09-29 ASSESSMENT — PAIN DESCRIPTION - PAIN TYPE: TYPE: CHRONIC PAIN

## 2023-09-29 ASSESSMENT — PAIN SCALES - GENERAL
PAINLEVEL_OUTOF10: 7
PAINLEVEL_OUTOF10: 3
PAINLEVEL_OUTOF10: 6

## 2023-09-29 ASSESSMENT — PAIN DESCRIPTION - ORIENTATION: ORIENTATION: LOWER

## 2023-09-29 ASSESSMENT — PAIN DESCRIPTION - LOCATION: LOCATION: BACK

## 2023-09-29 ASSESSMENT — PAIN DESCRIPTION - DESCRIPTORS: DESCRIPTORS: ACHING

## 2023-09-29 NOTE — PROGRESS NOTES
Hospitalist Progress Note      Name:  Zahira Gao /Age/Sex: 1955  (74 y.o. female)   MRN & CSN:  [id number] & [id number] Encounter Date/Time: 2023 8:10 AM EDT   Location:  [facility]3302/3302-01 PCP: General Nikos MD     [place] [address] Day: 3    Subjective:   Chief Complaint:   Chief Complaint   Patient presents with    Back Pain     Pt brought by [facility] from [facility]) care. Pt hx renal failure, diabetes, COPD. Pt states that her lower back pain has became worse tonight has been hurting today became worse tonight. Pt states has UTI been treated for 4 days now. Pt states dialysis MWF. On 3L O2 baseline     Zahira Gao is a 70 y.o. female with a past medical history of ESRD on HD, DM2, anemia, s/p BKA for left foot infection, CHF, GERD, obesity, COPD, hypothyroidism, MILE on CPAP who presented with abd and left flank pain from [facility] care  Had abnormal u/a and bladder thickening on CTAP, she was admitted for UTI and treated with IV meropenem for hx of ESBL. Interval History: Today, resting in bed today, currently getting HD. Has swelling and abd bloating. She had multiple BMs overnight. Denies CP or SOB. Remains on 2 lpm.  Today she denies pain or flank pain. No fever or chills. Independently reviewed interval ancillary notes from nephrology. Assessment and Recommendations   Problem List  Principal Problem:    Abdominal pain  Resolved Problems:    * No resolved hospital problems. *     Assessment and Plan:    Abnormal U/A  - ?  Complicated UTI with left flank pain              - Hx of ESBL              - Urine culture is NGTD, will follow another day given symptoms               - Blood cx pending              - Continue IV meropenem  ESRD              - CT AP consistent with fluid overload              - MWF HD              - Consult Nephrology   Constipation              - Has had multiple BM overnight   - KUB today   MILE              - CPAP PRN  oxyCODONE-acetaminophen, 1 tablet, Q8H PRN  melatonin, 3 mg, Nightly PRN        Labs and Imaging   Results this Admission:  CT AP: 9/27/2023  IMPRESSION:  Cholelithiasis which is unchanged. Mild hepatosplenomegaly which is unchanged. Perirenal scarring and multiple renal cysts which are unchanged with no hydronephrosis or urinary obstruction. Mild ascites in the abdomen which is less prominent and moderate ascites throughout the pelvis which is slightly increased. Diffuse moderate bladder wall thickening which may just be due to poor distension with an air-fluid level superiorly which is more apparent and is probably is due to recent instrumentation of the bladder lumen. Recommend  correlating with urinalysis and follow-up, if indicated     Mild-to-moderate bibasilar pleural effusions with bibasilar atelectasis posteriorly which is less prominent. Moderate 3rd spacing of fluid or cellulitis in the subcutaneous soft tissues along the abdominal wall extending inferiorly which is more prominent. Questionable diffuse mild ileus which is more prominent. U/A: abnormal    ECG:     Relevant Prior Studies:   Echo: 3/9/2023   Left Ventricle: Normal systolic function with a visually estimated EF   of 60 - 65%. Left Atrium: Left atrium is severely dilated. Right Ventricle: Normal systolic function. TAPSE is 2.28 cm. Tricuspid Valve: Severe pulmonary hypertension with RVSP 71 mmHg. IVC/SVC: IVC diameter is greater than 21 mm and decreases less than 50% during inspiration; therefore the estimated right atrial pressure is elevated (~15 mmHg).    CBC:   Recent Labs     09/28/23  0026 09/28/23  0505 09/29/23  0543   WBC 4.8 4.5 4.7   HGB 8.2* 8.5* 8.5*    154 146     BMP:    Recent Labs     09/28/23  0026 09/28/23  0505 09/29/23  0543    140 138   K 4.5 5.1 5.0   CL 93* 95* 96*   CO2 33* 37* 35*   BUN 16 17 24*   CREATININE 2.5* 2.9* 3.7*   GLUCOSE 142* 129* 105*

## 2023-09-29 NOTE — PROGRESS NOTES
Treatment time: 3.5 hours  Net UF: 2000 ml     Pre weight: 113.5 kg  Post weight:111.5 kg  EDW: 114 kg    Access used: CVC    Access function: good with  ml/min     Medications or blood products given: n/a     Regular outpatient schedule: MWF     Summary of response to treatment: Patient tolerated treatment well and without any complications. Patient remained stable throughout entire treatment. Copy of dialysis treatment record placed in chart, to be scanned into EMR.

## 2023-09-29 NOTE — CONSULTS
[facility] Consult Note  Inpatient Setting - [address] [facility]    Provider: Waqar Mcarthur MD Patient ID:  Admission Date: 2023 Name: Sirisha Lockhart  OR Date: n/a MRN: [id number]   Patient Location: 3AN-3302/3302-01 : 1955  Attending: Suzie Landin MD Date of Service: 2023  PCP: Ninoska Gipson MD     Diagnoses:  1. Bilateral low back pain without sciatica, unspecified chronicity    2. Generalized abdominal pain    3. History of ESBL Klebsiella pneumoniae infection      Pt here for uti and foot infection. Ct shows wen renal cysts and poss complex cysts. No gross hematuria. No renal surg. Dialysis. Assessment/Plan:  Renal failure  Renal masses  Uti    Recc  Start with renal us  Has pacemeaker so mri will be diff    All the patients questions were answered in detail. She understands the plan as listed above. Review/order of labs  Review/order of radiology studies  discussion with referring MD  Decision to obtain old records or supplemental information from family. yes  transferring data from old records into today's office visit record  Independent review and interpretation of test or study   Total time spent face-to-face with the patient 20min, including greater than 50% of this time in discussion with the patient/family concerning the following:  Recommended tests  management options  risks/benefits of management options  importance of compliance  Prognosis  Risk factor reduction  Patient/family education                                                                                                                                                      CC:   Chief Complaint   Patient presents with    Back Pain     Pt brought by [facility] from [facility]. Pt hx renal failure, diabetes, COPD. Pt states that her lower back pain has became worse tonight has been hurting today became worse tonight. Pt states has UTI been treated for 4 days now. Pt states dialysis MWF.  On 3L O2

## 2023-09-29 NOTE — PROGRESS NOTES
pleural effusions with bibasilar atelectasis posteriorly which is less prominent. Moderate 3rd spacing of fluid or cellulitis in the subcutaneous soft tissues along the abdominal wall extending inferiorly which is more prominent. Questionable diffuse mild ileus which is more prominent.           ======================================================================  Please note that this chart entry has been generated using voice recognition software, mainly. So please excuse brevity and/or typos. While every effort and attempts have been made to ensure the accuracy of this automated transcription, some errors may have occurred; and certain words and phrases in transcription may not be entered as intended. However, inadvertent computerized transcription errors may be present. So please contact us if any clarification needed.

## 2023-09-29 NOTE — PROGRESS NOTES
Assessment completed. VSS. Patient awake, alert, and in bed. Medications given per MAR. . No coverage needed. Patient had one episode of loose stool. No complaints of pain or discomfort at this time. Safety and isolation precautions in place. Call light within reach. Will continue to monitor.

## 2023-09-30 LAB
ANION GAP SERPL CALCULATED.3IONS-SCNC: 10 MMOL/L (ref 3–16)
BASOPHILS # BLD: 0 K/UL (ref 0–0.2)
BASOPHILS NFR BLD: 0.9 %
BUN SERPL-MCNC: 16 MG/DL (ref 7–20)
CALCIUM SERPL-MCNC: 9.3 MG/DL (ref 8.3–10.6)
CHLORIDE SERPL-SCNC: 95 MMOL/L (ref 99–110)
CO2 SERPL-SCNC: 26 MMOL/L (ref 21–32)
CREAT SERPL-MCNC: 2.8 MG/DL (ref 0.6–1.2)
DEPRECATED RDW RBC AUTO: 19.2 % (ref 12.4–15.4)
EOSINOPHIL # BLD: 0.2 K/UL (ref 0–0.6)
EOSINOPHIL NFR BLD: 4.6 %
GFR SERPLBLD CREATININE-BSD FMLA CKD-EPI: 18 ML/MIN/{1.73_M2}
GLUCOSE BLD-MCNC: 105 MG/DL (ref 70–99)
GLUCOSE BLD-MCNC: 127 MG/DL (ref 70–99)
GLUCOSE BLD-MCNC: 139 MG/DL (ref 70–99)
GLUCOSE BLD-MCNC: 170 MG/DL (ref 70–99)
GLUCOSE SERPL-MCNC: 93 MG/DL (ref 70–99)
HCT VFR BLD AUTO: 27.9 % (ref 36–48)
HGB BLD-MCNC: 8.7 G/DL (ref 12–16)
LYMPHOCYTES # BLD: 0.7 K/UL (ref 1–5.1)
LYMPHOCYTES NFR BLD: 14.4 %
MCH RBC QN AUTO: 30.3 PG (ref 26–34)
MCHC RBC AUTO-ENTMCNC: 31.1 G/DL (ref 31–36)
MCV RBC AUTO: 97.4 FL (ref 80–100)
MONOCYTES # BLD: 0.3 K/UL (ref 0–1.3)
MONOCYTES NFR BLD: 6.6 %
NEUTROPHILS # BLD: 3.4 K/UL (ref 1.7–7.7)
NEUTROPHILS NFR BLD: 73.5 %
PERFORMED ON: ABNORMAL
PLATELET # BLD AUTO: 135 K/UL (ref 135–450)
PMV BLD AUTO: 8 FL (ref 5–10.5)
POTASSIUM SERPL-SCNC: 4.4 MMOL/L (ref 3.5–5.1)
RBC # BLD AUTO: 2.87 M/UL (ref 4–5.2)
SODIUM SERPL-SCNC: 131 MMOL/L (ref 136–145)
WBC # BLD AUTO: 4.6 K/UL (ref 4–11)

## 2023-09-30 PROCEDURE — 85025 COMPLETE CBC W/AUTO DIFF WBC: CPT

## 2023-09-30 PROCEDURE — 6360000002 HC RX W HCPCS: Performed by: STUDENT IN AN ORGANIZED HEALTH CARE EDUCATION/TRAINING PROGRAM

## 2023-09-30 PROCEDURE — 6370000000 HC RX 637 (ALT 250 FOR IP): Performed by: NURSE PRACTITIONER

## 2023-09-30 PROCEDURE — 2580000003 HC RX 258: Performed by: STUDENT IN AN ORGANIZED HEALTH CARE EDUCATION/TRAINING PROGRAM

## 2023-09-30 PROCEDURE — 36415 COLL VENOUS BLD VENIPUNCTURE: CPT

## 2023-09-30 PROCEDURE — 6370000000 HC RX 637 (ALT 250 FOR IP): Performed by: STUDENT IN AN ORGANIZED HEALTH CARE EDUCATION/TRAINING PROGRAM

## 2023-09-30 PROCEDURE — 2580000003 HC RX 258: Performed by: INTERNAL MEDICINE

## 2023-09-30 PROCEDURE — 2700000000 HC OXYGEN THERAPY PER DAY

## 2023-09-30 PROCEDURE — 1200000000 HC SEMI PRIVATE

## 2023-09-30 PROCEDURE — 94761 N-INVAS EAR/PLS OXIMETRY MLT: CPT

## 2023-09-30 PROCEDURE — 80048 BASIC METABOLIC PNL TOTAL CA: CPT

## 2023-09-30 PROCEDURE — 94640 AIRWAY INHALATION TREATMENT: CPT

## 2023-09-30 PROCEDURE — 36556 INSERT NON-TUNNEL CV CATH: CPT

## 2023-09-30 PROCEDURE — 6370000000 HC RX 637 (ALT 250 FOR IP): Performed by: INTERNAL MEDICINE

## 2023-09-30 PROCEDURE — 6360000002 HC RX W HCPCS: Performed by: INTERNAL MEDICINE

## 2023-09-30 RX ORDER — SENNA AND DOCUSATE SODIUM 50; 8.6 MG/1; MG/1
2 TABLET, FILM COATED ORAL DAILY
Status: DISCONTINUED | OUTPATIENT
Start: 2023-09-30 | End: 2023-10-02 | Stop reason: HOSPADM

## 2023-09-30 RX ORDER — POLYETHYLENE GLYCOL 3350 17 G/17G
17 POWDER, FOR SOLUTION ORAL DAILY
Status: DISCONTINUED | OUTPATIENT
Start: 2023-09-30 | End: 2023-10-02 | Stop reason: HOSPADM

## 2023-09-30 RX ORDER — DIPHENHYDRAMINE HCL 25 MG
25 TABLET ORAL EVERY 8 HOURS PRN
Status: DISCONTINUED | OUTPATIENT
Start: 2023-09-30 | End: 2023-10-02 | Stop reason: HOSPADM

## 2023-09-30 RX ADMIN — LEVOTHYROXINE SODIUM 100 MCG: 0.1 TABLET ORAL at 06:51

## 2023-09-30 RX ADMIN — ATORVASTATIN CALCIUM 40 MG: 40 TABLET, FILM COATED ORAL at 20:37

## 2023-09-30 RX ADMIN — SENNOSIDES AND DOCUSATE SODIUM 2 TABLET: 50; 8.6 TABLET ORAL at 20:37

## 2023-09-30 RX ADMIN — IPRATROPIUM BROMIDE AND ALBUTEROL SULFATE 1 DOSE: .5; 3 SOLUTION RESPIRATORY (INHALATION) at 19:25

## 2023-09-30 RX ADMIN — MEROPENEM 1000 MG: 1 INJECTION, POWDER, FOR SOLUTION INTRAVENOUS at 02:22

## 2023-09-30 RX ADMIN — ALLOPURINOL 50 MG: 100 TABLET ORAL at 09:28

## 2023-09-30 RX ADMIN — Medication 10 ML: at 20:51

## 2023-09-30 RX ADMIN — OXYCODONE AND ACETAMINOPHEN 1 TABLET: 5; 325 TABLET ORAL at 15:50

## 2023-09-30 RX ADMIN — ASPIRIN 81 MG 81 MG: 81 TABLET ORAL at 09:28

## 2023-09-30 RX ADMIN — Medication 10 ML: at 09:29

## 2023-09-30 RX ADMIN — METOPROLOL TARTRATE 25 MG: 25 TABLET, FILM COATED ORAL at 09:28

## 2023-09-30 RX ADMIN — POLYETHYLENE GLYCOL 3350 17 G: 17 POWDER, FOR SOLUTION ORAL at 15:21

## 2023-09-30 RX ADMIN — SODIUM CHLORIDE: 9 INJECTION, SOLUTION INTRAVENOUS at 02:20

## 2023-09-30 RX ADMIN — METOPROLOL TARTRATE 25 MG: 25 TABLET, FILM COATED ORAL at 20:37

## 2023-09-30 RX ADMIN — HEPARIN SODIUM 5000 UNITS: 5000 INJECTION INTRAVENOUS; SUBCUTANEOUS at 20:37

## 2023-09-30 RX ADMIN — HEPARIN SODIUM 5000 UNITS: 5000 INJECTION INTRAVENOUS; SUBCUTANEOUS at 15:21

## 2023-09-30 RX ADMIN — PANTOPRAZOLE SODIUM 40 MG: 40 TABLET, DELAYED RELEASE ORAL at 06:51

## 2023-09-30 RX ADMIN — IPRATROPIUM BROMIDE AND ALBUTEROL SULFATE 1 DOSE: .5; 3 SOLUTION RESPIRATORY (INHALATION) at 07:00

## 2023-09-30 RX ADMIN — LISINOPRIL 2.5 MG: 5 TABLET ORAL at 09:28

## 2023-09-30 RX ADMIN — DIPHENHYDRAMINE HYDROCHLORIDE 25 MG: 25 TABLET ORAL at 23:10

## 2023-09-30 RX ADMIN — HEPARIN SODIUM 5000 UNITS: 5000 INJECTION INTRAVENOUS; SUBCUTANEOUS at 06:51

## 2023-09-30 RX ADMIN — GABAPENTIN 200 MG: 100 CAPSULE ORAL at 20:37

## 2023-09-30 ASSESSMENT — PAIN DESCRIPTION - DESCRIPTORS: DESCRIPTORS: ACHING;DISCOMFORT

## 2023-09-30 ASSESSMENT — PAIN DESCRIPTION - LOCATION: LOCATION: BACK;SHOULDER

## 2023-09-30 ASSESSMENT — PAIN SCALES - GENERAL
PAINLEVEL_OUTOF10: 6
PAINLEVEL_OUTOF10: 8

## 2023-09-30 ASSESSMENT — PAIN - FUNCTIONAL ASSESSMENT: PAIN_FUNCTIONAL_ASSESSMENT: PREVENTS OR INTERFERES SOME ACTIVE ACTIVITIES AND ADLS

## 2023-09-30 ASSESSMENT — PAIN DESCRIPTION - ORIENTATION: ORIENTATION: LOWER;LEFT;RIGHT

## 2023-09-30 NOTE — PROGRESS NOTES
Assessment completed. VSS. Patient awake, alert, and in bed. Medications given per MAR. No complaints of pain or discomfort at this time. Assisted patient with washing her prosthetic gear in hopes that she will be able to get up and out of bed with PT tomorrow. Safety and isolation precautions in place. Call light within reach. Will continue to monitor.

## 2023-09-30 NOTE — PROGRESS NOTES
MD Aliza Salomon MD Vinson Quinton, MD                Office: [phone]                      Fax: [phone]          NEPHROLOGY INPATIENT PROGRESS NOTE:     PATIENT NAME: Salvador Chance  : 1955  MRN: [id number]       IMPRESSION / RECOMMENDATION:      Admitted on:  2023  For:  Abdominal pain [R10.9]  Generalized abdominal pain [R10.84]  History of ESBL Klebsiella pneumoniae infection [Z86.19]  Bilateral low back pain without sciatica, unspecified chronicity [M54.50]       1. ESRD on iHD: MWF.   - outpt HD center: [facility]. [address] [facility] nephrology  - Access: [facility], follow-up outpatient with her nephrologist for further access plans    -s/p HD Friday   \"Net UF: 2000 ml  Pre weight: 113.5 kg  Post weight:111.5 kg  EDW: 114 kg\"    -Next HD Monday,  - attempt to get her below DW ~ 1 kilo , but her BP softer,     -Kidney cyst  CT scan-  -\"3. Bilateral renal cysts. There is an isodense nodule in the left kidney  measuring 2.4 cm. I can not exclude the possibility of a renal cell  carcinoma. I would recommend follow-up renal mass protocol CT scan or MRI  for further evaluation. CT scan- this admission  - 3 cm cystic mass, in the upper right kidney, with 4 cm hypodensity with multiple hypodensity in left kidney, 3 cm unchanged,  -But patient is concerned about that, likely is benign,   - but patient wants further evaluation, so consulted urology for further work up       2. Hypertension/Volume Status:  - BP hypertension-okay control continue current plan:   - Na controlled  - EDW follow-up outpatient records   :     - fluid removal to DW      3. Electrolytes/acid-base: controlled    4. Anemia of renal failure: below goal  - Iron: ,monitor outpt   - CHRISTIANO:   with HD    5. BMD:  - Phos, calcium - follow in morning labs-     - follow iPTH outpt      :  Other supportive care :   - Check daily renal function panel

## 2023-09-30 NOTE — PROGRESS NOTES
Shift assessment completed, VSS, scheduled medications given per MAR, pt set up for breakfast, fresh coffee provided per pt request, pt denies any pain/discomfort at this time. All questions asked/answered. Breaks locked, bed in lowest position and call light within reach.

## 2023-09-30 NOTE — PROGRESS NOTES
Hospitalist Progress Note      Name:  Lewis Girard /Age/Sex: 1955  (74 y.o. female)   MRN & CSN:  [id number] & [id number] Encounter Date/Time: 2023 8:10 AM EDT   Location:  [facility]3302/3302-01 PCP: Erick Freire MD     [place] [address] Day: 4    Subjective:   Chief Complaint:   Chief Complaint   Patient presents with    Back Pain     Pt brought by [facility] from [facility] care. Pt hx renal failure, diabetes, COPD. Pt states that her lower back pain has became worse tonight has been hurting today became worse tonight. Pt states has UTI been treated for 4 days now. Pt states dialysis MWF. On 3L O2 baseline     Lewis Girard is a 70 y.o. female with a past medical history of ESRD on HD, DM2, anemia, s/p BKA for left foot infection, CHF, GERD, obesity, COPD, hypothyroidism, MILE on CPAP who presented with abd and left flank pain from [facility] care  Had abnormal u/a and bladder thickening on CTAP, she was admitted for UTI and treated with IV meropenem for hx of ESBL. Interval History: Today, resting in bed today. She is feeling better     Independently reviewed interval ancillary notes from nephrology. Assessment and Recommendations   Problem List  Principal Problem:    Abdominal pain  Resolved Problems:    * No resolved hospital problems. *     Assessment and Plan:    Abnormal U/A  - ?  Complicated UTI with left flank pain              - Hx of ESBL              - Urine culture is NGTD after 2 days   - Stop IV meropenem, received x 3 days              - Blood cx NGTD  ESRD              - CT AP consistent with fluid overload              - MWF HD              - Consult Nephrology   Constipation              - Has had multiple BM overnight   - KUB with mild stool burden, no ileus or SBO   - Continue Glycolax and add Sennakot daily   MILE              - CPAP   Hypothyroidism              - TSH- 2.7              - Continue levothyroxine  Chronic CHF              - Appears PRN  melatonin, 3 mg, Nightly PRN        Labs and Imaging   Results this Admission:  CT AP: 9/27/2023  IMPRESSION:  Cholelithiasis which is unchanged. Mild hepatosplenomegaly which is unchanged. Perirenal scarring and multiple renal cysts which are unchanged with no hydronephrosis or urinary obstruction. Mild ascites in the abdomen which is less prominent and moderate ascites throughout the pelvis which is slightly increased. Diffuse moderate bladder wall thickening which may just be due to poor distension with an air-fluid level superiorly which is more apparent and is probably is due to recent instrumentation of the bladder lumen. Recommend  correlating with urinalysis and follow-up, if indicated     Mild-to-moderate bibasilar pleural effusions with bibasilar atelectasis posteriorly which is less prominent. Moderate 3rd spacing of fluid or cellulitis in the subcutaneous soft tissues along the abdominal wall extending inferiorly which is more prominent. Questionable diffuse mild ileus which is more prominent. U/A: abnormal    ECG:     Relevant Prior Studies:   Echo: 3/9/2023   Left Ventricle: Normal systolic function with a visually estimated EF   of 60 - 65%. Left Atrium: Left atrium is severely dilated. Right Ventricle: Normal systolic function. TAPSE is 2.28 cm. Tricuspid Valve: Severe pulmonary hypertension with RVSP 71 mmHg. IVC/SVC: IVC diameter is greater than 21 mm and decreases less than 50% during inspiration; therefore the estimated right atrial pressure is elevated (~15 mmHg).    CBC:   Recent Labs     09/28/23  0505 09/29/23  0543 09/30/23  0425   WBC 4.5 4.7 4.6   HGB 8.5* 8.5* 8.7*    146 135       BMP:    Recent Labs     09/28/23  0505 09/29/23  0543 09/30/23  0425    138 131*   K 5.1 5.0 4.4   CL 95* 96* 95*   CO2 37* 35* 26   BUN 17 24* 16   CREATININE 2.9* 3.7* 2.8*   GLUCOSE 129* 105* 93       Hepatic:   Recent Labs     09/28/23  0026

## 2023-10-01 ENCOUNTER — APPOINTMENT (OUTPATIENT)
Dept: GENERAL RADIOLOGY | Age: 68
DRG: 689 | End: 2023-10-01
Payer: MEDICARE

## 2023-10-01 LAB
ANION GAP SERPL CALCULATED.3IONS-SCNC: 9 MMOL/L (ref 3–16)
BUN SERPL-MCNC: 27 MG/DL (ref 7–20)
CALCIUM SERPL-MCNC: 9.6 MG/DL (ref 8.3–10.6)
CHLORIDE SERPL-SCNC: 96 MMOL/L (ref 99–110)
CO2 SERPL-SCNC: 32 MMOL/L (ref 21–32)
CREAT SERPL-MCNC: 3.8 MG/DL (ref 0.6–1.2)
DEPRECATED RDW RBC AUTO: 19.5 % (ref 12.4–15.4)
GFR SERPLBLD CREATININE-BSD FMLA CKD-EPI: 12 ML/MIN/{1.73_M2}
GLUCOSE BLD-MCNC: 101 MG/DL (ref 70–99)
GLUCOSE BLD-MCNC: 113 MG/DL (ref 70–99)
GLUCOSE BLD-MCNC: 143 MG/DL (ref 70–99)
GLUCOSE BLD-MCNC: 89 MG/DL (ref 70–99)
GLUCOSE SERPL-MCNC: 102 MG/DL (ref 70–99)
HCT VFR BLD AUTO: 28.5 % (ref 36–48)
HGB BLD-MCNC: 8.9 G/DL (ref 12–16)
MCH RBC QN AUTO: 30.3 PG (ref 26–34)
MCHC RBC AUTO-ENTMCNC: 31.2 G/DL (ref 31–36)
MCV RBC AUTO: 97 FL (ref 80–100)
PERFORMED ON: ABNORMAL
PERFORMED ON: NORMAL
PLATELET # BLD AUTO: 143 K/UL (ref 135–450)
PMV BLD AUTO: 8.1 FL (ref 5–10.5)
POTASSIUM SERPL-SCNC: 5.2 MMOL/L (ref 3.5–5.1)
RBC # BLD AUTO: 2.94 M/UL (ref 4–5.2)
SODIUM SERPL-SCNC: 137 MMOL/L (ref 136–145)
WBC # BLD AUTO: 4.5 K/UL (ref 4–11)

## 2023-10-01 PROCEDURE — 6360000002 HC RX W HCPCS: Performed by: NURSE PRACTITIONER

## 2023-10-01 PROCEDURE — 6370000000 HC RX 637 (ALT 250 FOR IP): Performed by: STUDENT IN AN ORGANIZED HEALTH CARE EDUCATION/TRAINING PROGRAM

## 2023-10-01 PROCEDURE — 6370000000 HC RX 637 (ALT 250 FOR IP): Performed by: NURSE PRACTITIONER

## 2023-10-01 PROCEDURE — 94761 N-INVAS EAR/PLS OXIMETRY MLT: CPT

## 2023-10-01 PROCEDURE — 2580000003 HC RX 258: Performed by: STUDENT IN AN ORGANIZED HEALTH CARE EDUCATION/TRAINING PROGRAM

## 2023-10-01 PROCEDURE — 85027 COMPLETE CBC AUTOMATED: CPT

## 2023-10-01 PROCEDURE — 1200000000 HC SEMI PRIVATE

## 2023-10-01 PROCEDURE — 71045 X-RAY EXAM CHEST 1 VIEW: CPT

## 2023-10-01 PROCEDURE — 36415 COLL VENOUS BLD VENIPUNCTURE: CPT

## 2023-10-01 PROCEDURE — 2700000000 HC OXYGEN THERAPY PER DAY

## 2023-10-01 PROCEDURE — 80048 BASIC METABOLIC PNL TOTAL CA: CPT

## 2023-10-01 PROCEDURE — 6360000002 HC RX W HCPCS: Performed by: STUDENT IN AN ORGANIZED HEALTH CARE EDUCATION/TRAINING PROGRAM

## 2023-10-01 PROCEDURE — 2580000003 HC RX 258: Performed by: NURSE PRACTITIONER

## 2023-10-01 PROCEDURE — 94640 AIRWAY INHALATION TREATMENT: CPT

## 2023-10-01 RX ORDER — LACTULOSE 10 G/15ML
20 SOLUTION ORAL ONCE
Status: COMPLETED | OUTPATIENT
Start: 2023-10-01 | End: 2023-10-01

## 2023-10-01 RX ADMIN — HEPARIN SODIUM 5000 UNITS: 5000 INJECTION INTRAVENOUS; SUBCUTANEOUS at 06:47

## 2023-10-01 RX ADMIN — MEROPENEM 1000 MG: 1 INJECTION, POWDER, FOR SOLUTION INTRAVENOUS at 14:48

## 2023-10-01 RX ADMIN — POLYETHYLENE GLYCOL 3350 17 G: 17 POWDER, FOR SOLUTION ORAL at 09:04

## 2023-10-01 RX ADMIN — ASPIRIN 81 MG 81 MG: 81 TABLET ORAL at 09:04

## 2023-10-01 RX ADMIN — OXYCODONE AND ACETAMINOPHEN 1 TABLET: 5; 325 TABLET ORAL at 20:49

## 2023-10-01 RX ADMIN — SENNOSIDES AND DOCUSATE SODIUM 2 TABLET: 50; 8.6 TABLET ORAL at 09:02

## 2023-10-01 RX ADMIN — ATORVASTATIN CALCIUM 40 MG: 40 TABLET, FILM COATED ORAL at 20:50

## 2023-10-01 RX ADMIN — Medication 10 ML: at 09:03

## 2023-10-01 RX ADMIN — METOPROLOL TARTRATE 25 MG: 25 TABLET, FILM COATED ORAL at 20:50

## 2023-10-01 RX ADMIN — METOPROLOL TARTRATE 25 MG: 25 TABLET, FILM COATED ORAL at 08:59

## 2023-10-01 RX ADMIN — IPRATROPIUM BROMIDE AND ALBUTEROL SULFATE 1 DOSE: .5; 3 SOLUTION RESPIRATORY (INHALATION) at 19:31

## 2023-10-01 RX ADMIN — GABAPENTIN 200 MG: 100 CAPSULE ORAL at 20:50

## 2023-10-01 RX ADMIN — DIPHENHYDRAMINE HYDROCHLORIDE 25 MG: 25 TABLET ORAL at 20:53

## 2023-10-01 RX ADMIN — PANTOPRAZOLE SODIUM 40 MG: 40 TABLET, DELAYED RELEASE ORAL at 06:47

## 2023-10-01 RX ADMIN — MELATONIN TAB 3 MG 3 MG: 3 TAB at 20:50

## 2023-10-01 RX ADMIN — LEVOTHYROXINE SODIUM 100 MCG: 0.1 TABLET ORAL at 06:47

## 2023-10-01 RX ADMIN — HEPARIN SODIUM 5000 UNITS: 5000 INJECTION INTRAVENOUS; SUBCUTANEOUS at 20:50

## 2023-10-01 RX ADMIN — OXYCODONE AND ACETAMINOPHEN 1 TABLET: 5; 325 TABLET ORAL at 00:45

## 2023-10-01 RX ADMIN — LACTULOSE 20 G: 20 SOLUTION ORAL at 11:07

## 2023-10-01 RX ADMIN — LISINOPRIL 2.5 MG: 5 TABLET ORAL at 09:02

## 2023-10-01 RX ADMIN — HEPARIN SODIUM 5000 UNITS: 5000 INJECTION INTRAVENOUS; SUBCUTANEOUS at 14:48

## 2023-10-01 RX ADMIN — IPRATROPIUM BROMIDE AND ALBUTEROL SULFATE 1 DOSE: .5; 3 SOLUTION RESPIRATORY (INHALATION) at 10:20

## 2023-10-01 RX ADMIN — OXYCODONE AND ACETAMINOPHEN 1 TABLET: 5; 325 TABLET ORAL at 08:58

## 2023-10-01 ASSESSMENT — PAIN DESCRIPTION - LOCATION
LOCATION: BACK

## 2023-10-01 ASSESSMENT — PAIN DESCRIPTION - DESCRIPTORS: DESCRIPTORS: ACHING

## 2023-10-01 ASSESSMENT — PAIN SCALES - GENERAL
PAINLEVEL_OUTOF10: 8
PAINLEVEL_OUTOF10: 7
PAINLEVEL_OUTOF10: 2
PAINLEVEL_OUTOF10: 8
PAINLEVEL_OUTOF10: 5

## 2023-10-01 NOTE — DISCHARGE SUMMARY
[address] [facility]    [facility] Demographics    Patient. Albino Stanley  Date of Birth. 1955  MRN. [id number]     Primary care provider. Jo Carreno MD  (Tel: [phone])    Admit date: 9/27/2023    Discharge date (blank if same as Note Date): Note Date: 10/2/2023     Reason for Hospitalization. Chief Complaint   Patient presents with    Back Pain     Pt brought by [facility] from [facility]) care. Pt hx renal failure, diabetes, COPD. Pt states that her lower back pain has became worse tonight has been hurting today became worse tonight. Pt states has UTI been treated for 4 days now. Pt states dialysis MWF. On 3L O2 baseline       Significant Findings. Principal Problem:    Abdominal pain  Resolved Problems:    * No resolved hospital problems. [facility]. Albino Stanley is a 70 y.o. female with a past medical history of ESRD on HD, DM2, CHB s/p PPM, anemia, s/p BKA for left foot infection, CHF, GERD, obesity, COPD, hypothyroidism, MILE on CPAP who presented with abd and left flank pain from [place] care  Had abnormal u/a and bladder thickening on CTAP, she was admitted for UTI and treated with IV meropenem for hx of ESBL. Treated x 4 days for symptomatic UTI, however urine culture was negative and abx therapy was discontinued. Noted to have worsening cardiomegaly on CXR concerning for pericardial effusion and limited echo was performed that did not show any pericardial effusion. O2 demands at or below baseline. Additional fluid removed in HD 9/29. Nephrology OK with discharge. She plans to return to [facility], however she is interested in transitioning to LTC closer to [place] where she is from.      Assessment and Plan:  Abnormal U/A  - ? UTI with left flank pain              - Hx of ESBL              - Urine culture is NGTD after 2 days              - Received Macrobid x 6 days as OP, Stop IV meropenem, received x 4 days- Caps capsule  Generic drug: linaclotide     meloxicam 7.5 MG tablet  Commonly known as: MOBIC     methocarbamol 500 MG tablet  Commonly known as: ROBAXIN     metoprolol tartrate 25 MG tablet  Commonly known as: LOPRESSOR     oxyCODONE-acetaminophen 5-325 MG per tablet  Commonly known as: PERCOCET     sennosides-docusate sodium 8.6-50 MG tablet  Commonly known as: SENOKOT-S     Symbicort 80-4.5 MCG/ACT Aero  Generic drug: budesonide-formoterol               Where to Get Your Medications        Information about where to get these medications is not yet available    Ask your nurse or doctor about these medications  aspirin 81 MG chewable tablet  atorvastatin 40 MG tablet  melatonin 3 MG Tabs tablet  polyethylene glycol 17 g packet       Spent 35 minutes in discharge process.     Signed:  FIDENCIO Thakur CNP     10/2/2023 1:01 PM

## 2023-10-01 NOTE — PROGRESS NOTES
MD Jimy Garcia MD Laureen Heller, MD                Office: [phone]                      Fax: [phone]          NEPHROLOGY INPATIENT PROGRESS NOTE:     PATIENT NAME: Sirisha Lockhart  : 1955  MRN: [id number]       IMPRESSION / RECOMMENDATION:      Admitted on:  2023  For:  Abdominal pain [R10.9]  Generalized abdominal pain [R10.84]  History of ESBL Klebsiella pneumoniae infection [Z86.19]  Bilateral low back pain without sciatica, unspecified chronicity [M54.50]       1. ESRD on iHD: MWF.   - outpt HD center: [facility]. [address] [facility] nephrology  - Access: [facility], follow-up outpatient with her nephrologist for further access plans    -s/p HD Friday   \"Net UF: 2000 ml  Pre weight: 113.5 kg  Post weight:111.5 kg  EDW: 114 kg\"  -Already below dry weight currently    -Next HD Monday,  - attempt to get her below DW ~ 1 kilo , but her BP softer,     -Kidney cyst  CT scan-  -\"3. Bilateral renal cysts. There is an isodense nodule in the left kidney  measuring 2.4 cm. I can not exclude the possibility of a renal cell  carcinoma. I would recommend follow-up renal mass protocol CT scan or MRI  for further evaluation. CT scan- this admission  - 3 cm cystic mass, in the upper right kidney, with 4 cm hypodensity with multiple hypodensity in left kidney, 3 cm unchanged,  -But patient is concerned about that, likely is benign,   - but patient wants further evaluation, so consulted urology for further work up       2. Hypertension/Volume Status:  - BP hypertension-okay control continue current plan:   - Na controlled  - EDW follow-up outpatient records   :     - fluid removal to DW      3. Electrolytes/acid-base: controlled    4. Anemia of renal failure: below goal  - Iron: ,monitor outpt   - CHRISTIANO:   with HD    5. BMD:  - Phos, calcium - follow in morning labs-     - follow iPTH outpt      :  Other supportive care : air-fluid level superiorly which is more apparent and is probably is due to recent instrumentation of the bladder lumen. Recommend correlating with urinalysis and follow-up, if indicated Mild-to-moderate bibasilar pleural effusions with bibasilar atelectasis posteriorly which is less prominent. Moderate 3rd spacing of fluid or cellulitis in the subcutaneous soft tissues along the abdominal wall extending inferiorly which is more prominent. Questionable diffuse mild ileus which is more prominent.           ======================================================================  Please note that this chart entry has been generated using voice recognition software, mainly. So please excuse brevity and/or typos. While every effort and attempts have been made to ensure the accuracy of this automated transcription, some errors may have occurred; and certain words and phrases in transcription may not be entered as intended. However, inadvertent computerized transcription errors may be present. So please contact us if any clarification needed.

## 2023-10-01 NOTE — PROGRESS NOTES
Hospitalist Progress Note      Name:  Marisela Brittle /Age/Sex: 1955  (74 y.o. female)   MRN & CSN:  [id number] & [id number] Encounter Date/Time: 10/1/2023 8:10 AM EDT   Location:  [facility]3302/3302-01 PCP: Krissy Hernandez MD     [place] [address] Day: 5    Subjective:   Chief Complaint:   Chief Complaint   Patient presents with    Back Pain     Pt brought by [facility] from [facility]) care. Pt hx renal failure, diabetes, COPD. Pt states that her lower back pain has became worse tonight has been hurting today became worse tonight. Pt states has UTI been treated for 4 days now. Pt states dialysis MWF. On 3L O2 baseline     Marisela Brittle is a 70 y.o. female with a past medical history of ESRD on HD, DM2, anemia, s/p BKA for left foot infection, CHF, GERD, obesity, COPD, hypothyroidism, MILE on CPAP who presented with abd and left flank pain from [facility] care  Had abnormal u/a and bladder thickening on CTAP, she was admitted for UTI and treated with IV meropenem for hx of ESBL. Interval History: Today, resting in bed today. She is feeling better, but feels full and more SOB today. Back and abd pain are not recurrent. No N/V/D. Independently reviewed interval ancillary notes from nephrology. Assessment and Recommendations   Problem List  Principal Problem:    Abdominal pain  Resolved Problems:    * No resolved hospital problems.  *     Assessment and Plan:    Abnormal U/A  - ? UTI with left flank pain              - Hx of ESBL              - Urine culture is NGTD after 2 days   - Received Macrobid x 6 days as OP, Stop IV meropenem, received x 4 days- discussed with attending              - Blood cx NGTD  ESRD              - CT AP consistent with fluid overload              - MWF HD              - Consult Nephrology - fluid challenge in HD on Friday with additional fluid removal, stable for discharge   Constipation              - Has had multiple BM overnight   - KUB with mild than 50% during inspiration; therefore the estimated right atrial pressure is elevated (~15 mmHg). CBC:   Recent Labs     09/29/23  0543 09/30/23  0425 10/01/23  0719   WBC 4.7 4.6 4.5   HGB 8.5* 8.7* 8.9*    135 143       BMP:    Recent Labs     09/29/23  0543 09/30/23  0425 10/01/23  0719    131* 137   K 5.0 4.4 5.2*   CL 96* 95* 96*   CO2 35* 26 32   BUN 24* 16 27*   CREATININE 3.7* 2.8* 3.8*   GLUCOSE 105* 93 102*       Hepatic:   No results for input(s): \"AST\", \"ALT\", \"ALB\", \"BILITOT\", \"ALKPHOS\" in the last 72 hours. Lipids: No results found for: \"CHOL\", \"HDL\", \"TRIG\"  Hemoglobin A1C: No results found for: \"LABA1C\"  TSH: No results found for: \"TSH\"  Troponin: No results found for: \"TROPONINT\"  Lactic Acid: No results for input(s): \"LACTA\" in the last 72 hours. BNP: No results for input(s): \"PROBNP\" in the last 72 hours. UA:  Lab Results   Component Value Date/Time    NITRU Negative 09/28/2023 05:17 AM    COLORU DARK YELLOW 09/28/2023 05:17 AM    PHUR 5.0 09/28/2023 05:17 AM    WBCUA 1403 09/28/2023 05:17 AM    RBCUA 83 09/28/2023 05:17 AM    BACTERIA Rare 09/28/2023 05:17 AM    CLARITYU TURBID 09/28/2023 05:17 AM    SPECGRAV 1.020 09/28/2023 05:17 AM    LEUKOCYTESUR LARGE 09/28/2023 05:17 AM    UROBILINOGEN 1.0 09/28/2023 05:17 AM    BILIRUBINUR MODERATE 09/28/2023 05:17 AM    BLOODU MODERATE 09/28/2023 05:17 AM    GLUCOSEU Negative 09/28/2023 05:17 AM    KETUA TRACE 09/28/2023 05:17 AM     Urine Cultures:   Lab Results   Component Value Date/Time    LABURIN No growth at 18 to 36 hours 09/28/2023 05:11 AM     Blood Cultures:   Lab Results   Component Value Date/Time    [facility]  09/28/2023 10:23 AM     No Growth to date. Any change in status will be called. Lab Results   Component Value Date/Time    BLOODCULT2  09/28/2023 10:31 AM     No Growth to date. Any change in status will be called.      Organism: No results found for: \"ORG\"    Personally reviewed labs, diagnostic, device, and imaging

## 2023-10-02 VITALS
HEART RATE: 71 BPM | RESPIRATION RATE: 18 BRPM | BODY MASS INDEX: 39.63 KG/M2 | TEMPERATURE: 97.1 F | WEIGHT: 261.47 LBS | DIASTOLIC BLOOD PRESSURE: 55 MMHG | OXYGEN SATURATION: 100 % | HEIGHT: 68 IN | SYSTOLIC BLOOD PRESSURE: 121 MMHG

## 2023-10-02 LAB
ALBUMIN SERPL-MCNC: 3.9 G/DL (ref 3.4–5)
ANION GAP SERPL CALCULATED.3IONS-SCNC: 12 MMOL/L (ref 3–16)
BACTERIA BLD CULT ORG #2: NORMAL
BACTERIA BLD CULT: NORMAL
BUN SERPL-MCNC: 34 MG/DL (ref 7–20)
CALCIUM SERPL-MCNC: 9.4 MG/DL (ref 8.3–10.6)
CHLORIDE SERPL-SCNC: 94 MMOL/L (ref 99–110)
CHOLEST SERPL-MCNC: 118 MG/DL (ref 0–199)
CO2 SERPL-SCNC: 28 MMOL/L (ref 21–32)
CREAT SERPL-MCNC: 4.4 MG/DL (ref 0.6–1.2)
GFR SERPLBLD CREATININE-BSD FMLA CKD-EPI: 10 ML/MIN/{1.73_M2}
GLUCOSE BLD-MCNC: 101 MG/DL (ref 70–99)
GLUCOSE BLD-MCNC: 128 MG/DL (ref 70–99)
GLUCOSE SERPL-MCNC: 109 MG/DL (ref 70–99)
HBV SURFACE AB SERPL IA-ACNC: 13.43 MIU/ML
HBV SURFACE AG SERPL QL IA: NORMAL
HDLC SERPL-MCNC: 42 MG/DL (ref 40–60)
LDL CHOLESTEROL CALCULATED: 58 MG/DL
PERFORMED ON: ABNORMAL
PERFORMED ON: ABNORMAL
PHOSPHATE SERPL-MCNC: 4.4 MG/DL (ref 2.5–4.9)
POTASSIUM SERPL-SCNC: 5.5 MMOL/L (ref 3.5–5.1)
SODIUM SERPL-SCNC: 134 MMOL/L (ref 136–145)
TRIGL SERPL-MCNC: 89 MG/DL (ref 0–150)
VLDLC SERPL CALC-MCNC: 18 MG/DL

## 2023-10-02 PROCEDURE — 6370000000 HC RX 637 (ALT 250 FOR IP): Performed by: INTERNAL MEDICINE

## 2023-10-02 PROCEDURE — 80069 RENAL FUNCTION PANEL: CPT

## 2023-10-02 PROCEDURE — 90935 HEMODIALYSIS ONE EVALUATION: CPT

## 2023-10-02 PROCEDURE — 2580000003 HC RX 258: Performed by: STUDENT IN AN ORGANIZED HEALTH CARE EDUCATION/TRAINING PROGRAM

## 2023-10-02 PROCEDURE — 94761 N-INVAS EAR/PLS OXIMETRY MLT: CPT

## 2023-10-02 PROCEDURE — 6370000000 HC RX 637 (ALT 250 FOR IP): Performed by: STUDENT IN AN ORGANIZED HEALTH CARE EDUCATION/TRAINING PROGRAM

## 2023-10-02 PROCEDURE — 93308 TTE F-UP OR LMTD: CPT

## 2023-10-02 PROCEDURE — 6360000002 HC RX W HCPCS: Performed by: STUDENT IN AN ORGANIZED HEALTH CARE EDUCATION/TRAINING PROGRAM

## 2023-10-02 PROCEDURE — 6370000000 HC RX 637 (ALT 250 FOR IP): Performed by: NURSE PRACTITIONER

## 2023-10-02 PROCEDURE — 2700000000 HC OXYGEN THERAPY PER DAY

## 2023-10-02 PROCEDURE — 36415 COLL VENOUS BLD VENIPUNCTURE: CPT

## 2023-10-02 PROCEDURE — 86706 HEP B SURFACE ANTIBODY: CPT

## 2023-10-02 PROCEDURE — 87340 HEPATITIS B SURFACE AG IA: CPT

## 2023-10-02 PROCEDURE — 94640 AIRWAY INHALATION TREATMENT: CPT

## 2023-10-02 PROCEDURE — 80061 LIPID PANEL: CPT

## 2023-10-02 RX ORDER — ATORVASTATIN CALCIUM 40 MG/1
40 TABLET, FILM COATED ORAL NIGHTLY
Qty: 30 TABLET | Refills: 3 | DISCHARGE
Start: 2023-10-02

## 2023-10-02 RX ORDER — HEPARIN SODIUM 1000 [USP'U]/ML
3700 INJECTION, SOLUTION INTRAVENOUS; SUBCUTANEOUS
Status: DISCONTINUED | OUTPATIENT
Start: 2023-10-02 | End: 2023-10-02 | Stop reason: HOSPADM

## 2023-10-02 RX ORDER — METHOCARBAMOL 500 MG/1
500 TABLET, FILM COATED ORAL 2 TIMES DAILY
COMMUNITY

## 2023-10-02 RX ORDER — SENNA AND DOCUSATE SODIUM 50; 8.6 MG/1; MG/1
1 TABLET, FILM COATED ORAL DAILY
COMMUNITY

## 2023-10-02 RX ORDER — CETIRIZINE HYDROCHLORIDE 10 MG/1
10 TABLET ORAL DAILY
COMMUNITY

## 2023-10-02 RX ORDER — ASPIRIN 81 MG/1
81 TABLET, CHEWABLE ORAL DAILY
Qty: 30 TABLET | Refills: 3 | DISCHARGE
Start: 2023-10-03

## 2023-10-02 RX ORDER — POLYETHYLENE GLYCOL 3350 17 G/17G
17 POWDER, FOR SOLUTION ORAL DAILY
Qty: 527 G | Refills: 1 | DISCHARGE
Start: 2023-10-03 | End: 2023-12-04

## 2023-10-02 RX ORDER — FERROUS SULFATE 325(65) MG
325 TABLET ORAL
COMMUNITY

## 2023-10-02 RX ORDER — HYDROCORTISONE ACETATE 25 MG/1
25 SUPPOSITORY RECTAL DAILY PRN
COMMUNITY

## 2023-10-02 RX ORDER — LANOLIN ALCOHOL/MO/W.PET/CERES
3 CREAM (GRAM) TOPICAL NIGHTLY PRN
Qty: 30 TABLET | Refills: 0 | DISCHARGE
Start: 2023-10-02

## 2023-10-02 RX ORDER — HYDROXYZINE HYDROCHLORIDE 25 MG/1
25 TABLET, FILM COATED ORAL 3 TIMES DAILY PRN
COMMUNITY

## 2023-10-02 RX ADMIN — DIPHENHYDRAMINE HYDROCHLORIDE 25 MG: 25 TABLET ORAL at 11:04

## 2023-10-02 RX ADMIN — PANTOPRAZOLE SODIUM 40 MG: 40 TABLET, DELAYED RELEASE ORAL at 06:32

## 2023-10-02 RX ADMIN — Medication 10 ML: at 11:05

## 2023-10-02 RX ADMIN — ALLOPURINOL 50 MG: 100 TABLET ORAL at 10:57

## 2023-10-02 RX ADMIN — ASPIRIN 81 MG 81 MG: 81 TABLET ORAL at 10:57

## 2023-10-02 RX ADMIN — IPRATROPIUM BROMIDE AND ALBUTEROL SULFATE 1 DOSE: .5; 3 SOLUTION RESPIRATORY (INHALATION) at 10:40

## 2023-10-02 RX ADMIN — DIPHENHYDRAMINE HYDROCHLORIDE 25 MG: 25 TABLET ORAL at 06:32

## 2023-10-02 RX ADMIN — POLYETHYLENE GLYCOL 3350 17 G: 17 POWDER, FOR SOLUTION ORAL at 10:57

## 2023-10-02 RX ADMIN — SENNOSIDES AND DOCUSATE SODIUM 2 TABLET: 50; 8.6 TABLET ORAL at 10:58

## 2023-10-02 RX ADMIN — LISINOPRIL 2.5 MG: 5 TABLET ORAL at 10:57

## 2023-10-02 RX ADMIN — HEPARIN SODIUM 5000 UNITS: 5000 INJECTION INTRAVENOUS; SUBCUTANEOUS at 06:32

## 2023-10-02 RX ADMIN — METOPROLOL TARTRATE 25 MG: 25 TABLET, FILM COATED ORAL at 10:57

## 2023-10-02 RX ADMIN — LEVOTHYROXINE SODIUM 100 MCG: 0.1 TABLET ORAL at 06:32

## 2023-10-02 RX ADMIN — OXYCODONE AND ACETAMINOPHEN 1 TABLET: 5; 325 TABLET ORAL at 11:04

## 2023-10-02 ASSESSMENT — PAIN DESCRIPTION - PAIN TYPE: TYPE: CHRONIC PAIN

## 2023-10-02 ASSESSMENT — PAIN SCALES - GENERAL
PAINLEVEL_OUTOF10: 8
PAINLEVEL_OUTOF10: 4

## 2023-10-02 ASSESSMENT — PAIN SCALES - WONG BAKER: WONGBAKER_NUMERICALRESPONSE: 2

## 2023-10-02 ASSESSMENT — PAIN DESCRIPTION - LOCATION: LOCATION: BACK

## 2023-10-02 NOTE — PROGRESS NOTES
Upon returning from testing, Morning assessment and medications complete, pt cooperated and tolerated well. Medications given per MAR. VS stable. A&O X4. Patient clean and dry. Tele monitor on. Breakfast tray set up. Bed side table, call light and belongings within reach. Bed locked and in lowest position, bed alarm active and audible. All questions and concerns addressed, no further needs at this time.

## 2023-10-02 NOTE — CARE COORDINATION
Second call put out Dilip Myers [phone] patient's son to let him know that his mother is returning to [facility] this evening. He stated he would like to have a contact number to eventually get his mother placed in a facility closer to [facility], since that is where most of her family resides. Son given two numbers from [phone], and Corrina Lacey [phone]. Dilip Myers also advised that he could speak to the  at [facility] to let them know of his wishes. Son says he is follow up.      Electronically signed by Shawna Ibarra RN on 10/2/2023 at 5:58 PM

## 2023-10-02 NOTE — PROGRESS NOTES
Nutrition Note    RECOMMENDATIONS  PO Diet: Continue current diet   ONS: Discontinue given adequate PO intake   Nutrition Support: None      NUTRITION ASSESSMENT   Pt seen for follow up assessment. Pt with good PO intake, consuming at least 50% of meals. Per nephrology note, pt's EDW is 114 lbs.  lbs. Pt also with hx CHF. Suspect any reported weight loss was likely mostly fluid related. Will discontinue oral nutrition supplement given adequate PO intake. Nutrition Related Findings: Na+ 134. K+ 5.5. +2 non-pitting RLE edema. LBM 9/29. Wounds: None  Nutrition Education:  Education not indicated   Nutrition Goals: PO intake 50% or greater, by next RD assessment     MALNUTRITION ASSESSMENT   Chronic Illness  Malnutrition Status: At risk for malnutrition (Comment)    NUTRITION DIAGNOSIS   No nutrition diagnosis at this time     CURRENT NUTRITION THERAPIES  ADULT DIET; Regular; 4 carb choices (60 gm/meal); Low Potassium (Less than 3000 mg/day)  ADULT ORAL NUTRITION SUPPLEMENT; Breakfast, Dinner; Standard 4 oz Oral Supplement     PO Intake: 26-50%, %   PO Supplement Intake:Unable to assess    ANTHROPOMETRICS  Current Height: 5' 7.5\" (171.5 cm)  Current Weight - Scale: 255 lb 3.2 oz (115.8 kg) (bed zeroed, pt was 244lb 2 days ago)    Admission weight: 244 lb (110.7 kg)  Ideal Body Weight (IBW): 138 lbs  (63 kg)    Usual Bodyweight 251 lb 5.2 oz (114 kg) (EDW)       BMI: 39.4    COMPARATIVE STANDARDS  Total Energy Requirements (kcals/day): 3044-8355     Protein (g):         Fluid (mL/day):  6798-6111 mL    The patient will be monitored per nutrition standards of care. Consult dietitian if additional nutrition interventions are needed prior to RD reassessment.      Chencho Farley [place], [address] [facility],     Contact: [phone]

## 2023-10-02 NOTE — PROGRESS NOTES
Patients head to toe assessment completed. Vital signs WNL. Bed alarm engaged, call light within reach. Scheduled medications given per MAR. Patient complained of back pain ,rates 7/10. PRN Percocet given. Patient resting in bed. Will continue to monitor.

## 2023-10-02 NOTE — PROGRESS NOTES
Treatment time: 3.5 hours    Net UF: 2000 ml    Pre weight: 120.3 kg  Post weight: 118.6  EDW: 114 kg    Access used: R Upper chest wall TDC  Access function: . Access runs without difficulty. Medications or blood products given: none    Regular outpatient schedule: MWF    Summary of response to treatment: Pt. Tolerates treatment well today. Copy of dialysis treatment record placed in chart, to be scanned into EMR.

## 2023-10-02 NOTE — PROGRESS NOTES
Physical/Occupational Therapy  Hoa Better    PT/OT orders received. Pt at HD at this time. Will follow-up as pt status and schedule allow. No charge.    Dhruv Palacio, PT, DPT #[id number]  Daniel Paniagua MOT OTR/L [id number]

## 2023-10-02 NOTE — PROGRESS NOTES
MD Raymundo Mcgill MD Lucia Bhat, MD                Office: [phone]                      Fax: [phone]          NEPHROLOGY INPATIENT PROGRESS NOTE:     PATIENT NAME: Liudmila Richter  : 1955  MRN: [id number]       IMPRESSION / RECOMMENDATION:      Admitted on:  2023  For:  Abdominal pain [R10.9]  Generalized abdominal pain [R10.84]  History of ESBL Klebsiella pneumoniae infection [Z86.19]  Bilateral low back pain without sciatica, unspecified chronicity [M54.50]       1. ESRD on iHD: MWF.   - outpt HD center: [facility]. [address] [facility] nephrology  - Access: [facility], follow-up outpatient with her nephrologist for further access plans    -s/p HD Friday   \"Net UF: 2000 ml  Pre weight: 113.5 kg  Post weight:111.5 kg  EDW: 114 kg\"  -Already below dry weight currently    -Next HD Monday,  -will attempt to get her below DW ~ 1 kilo , but her BP softer,    - if she can tolerate      -Kidney cyst  CT scan-  -\"3. Bilateral renal cysts. There is an isodense nodule in the left kidney  measuring 2.4 cm. I can not exclude the possibility of a renal cell  carcinoma. I would recommend follow-up renal mass protocol CT scan or MRI  for further evaluation. CT scan- this admission  - 3 cm cystic mass, in the upper right kidney, with 4 cm hypodensity with multiple hypodensity in left kidney, 3 cm unchanged,  -But patient is concerned about that, likely is benign,   - but patient wants further evaluation, so consulted urology for further work up       2. Hypertension/Volume Status:  - BP hypertension-okay control continue current plan:   - Na controlled  - EDW follow-up outpatient records   :     - fluid removal to DW      3.  Electrolytes/acid-base:   Hyperkalemia - mild -   Low K bath  Low K diet  On low dose Lisinoril YUSEF blockade now   Fup in am labs after HD  Keep metolazone  as Rx   Outpt can add Lasix non-HD

## 2023-10-02 NOTE — CARE COORDINATION
Patient noted to have a discharge order.   Pt has been medically cleared for transition to [address]    Patient discharged to   [facility] of [address] [place], [address]  Phone: [phone]  Fax: [phone]     HENS Completed:  No, LTC returning LTC    Pre-cert required/obtained:  No, LTC returning [facility] scheduled for 18:30-19:00     Transportation provided by   [employer]   [phone]     AVS faxed and agency notified: yes  To Donna Goel [phone]    The following prescriptions sent with pt: none on the chart    Family Notified:  Yes Elda Jimenez called [phone], VM left and requested a call back    Nurse Faviola Sullivan to call report to facility [phone]

## 2023-10-02 NOTE — CARE COORDINATION
10/02/23 1453   IMM Letter   IMM Letter given to Patient/Family/Significant other/Guardian/POA/by: Letter given to Albino Stanley by Montserrat Champagne RN   IMM Letter date given: 10/02/23   IMM Letter time given: 1454       Electronically signed by Milton Sams RN on 10/2/2023 at 2:54 PM

## 2023-10-11 NOTE — PROGRESS NOTES
Physician Progress Note      PATIENT:               Merline Laming  CSN #:                  [id number]  :                       1955  ADMIT DATE:       2023 11:41 PM  [address] [place] [address] DATE:        10/2/2023 7:43 PM  RESPONDING  PROVIDER #:        Ajith Freeman CNP          QUERY TEXT:    Pt admitted with flank pain. Pt noted to have been treated for UTI for 4 days   prior to admission, still with abnormal UA but negative urine culture. If   possible, please document in progress notes and discharge summary the present   on admission status of UTI:    The medical record reflects the following:  Risk Factors: 70year old female  Clinical Indicators:  ED provider note- She states she is currently at a   skilled nursing facility and is being treated for UTI with oral antibiotics. .. She did report having persistent dysuria despite taking oral antibiotics for   treatment of UTI.  H&P- UTI. .. UA was positive for large leukocyte   esterase. 10/02 d/c summary- Had abnormal u/a and bladder thickening on CTAP,   she was admitted for UTI and treated with IV meropenem for hx of ESBL. Treated x 4 days for symptomatic UTI, however urine culture was negative and   abx therapy was discontinued. Treatment: UA, UC, imaging, IV Merrem  Options provided:  -- Yes, UTI was present at the time of the order to admit to the hospital  -- UTI ruled out after study  -- Other - I will add my own diagnosis  -- Disagree - Not applicable / Not valid  -- Disagree - Clinically unable to determine / Unknown  -- Refer to Clinical Documentation Reviewer    PROVIDER RESPONSE TEXT:    UTI ruled out after study. Query created by:  Maryellen Trevino on 10/5/2023 9:32 AM      Electronically signed by:  Ajith Freeman CNP 10/10/2023 9:49 PM

## 2023-10-27 ENCOUNTER — HOSPITAL ENCOUNTER (INPATIENT)
Age: 68
LOS: 5 days | Discharge: HOME | DRG: 175 | End: 2023-11-02
Attending: EMERGENCY MEDICINE | Admitting: INTERNAL MEDICINE
Payer: MEDICARE

## 2023-10-27 DIAGNOSIS — N18.6 ANEMIA DUE TO CHRONIC KIDNEY DISEASE, ON CHRONIC DIALYSIS (HCC): ICD-10-CM

## 2023-10-27 DIAGNOSIS — M54.9 UPPER BACK PAIN: Primary | ICD-10-CM

## 2023-10-27 DIAGNOSIS — Z98.818 POSTPROCEDURAL HEMORRHAGE DUE TO COMPLICATION OF ORAL SURGERY: ICD-10-CM

## 2023-10-27 DIAGNOSIS — N28.9 HYPERVOLEMIA ASSOCIATED WITH RENAL INSUFFICIENCY: ICD-10-CM

## 2023-10-27 DIAGNOSIS — Z99.2 ANEMIA DUE TO CHRONIC KIDNEY DISEASE, ON CHRONIC DIALYSIS (HCC): ICD-10-CM

## 2023-10-27 DIAGNOSIS — K91.840 POSTPROCEDURAL HEMORRHAGE DUE TO COMPLICATION OF ORAL SURGERY: ICD-10-CM

## 2023-10-27 DIAGNOSIS — I26.99 ACUTE PULMONARY EMBOLISM WITHOUT ACUTE COR PULMONALE, UNSPECIFIED PULMONARY EMBOLISM TYPE (HCC): ICD-10-CM

## 2023-10-27 DIAGNOSIS — D63.1 ANEMIA DUE TO CHRONIC KIDNEY DISEASE, ON CHRONIC DIALYSIS (HCC): ICD-10-CM

## 2023-10-27 DIAGNOSIS — E87.70 HYPERVOLEMIA ASSOCIATED WITH RENAL INSUFFICIENCY: ICD-10-CM

## 2023-10-27 PROCEDURE — 99285 EMERGENCY DEPT VISIT HI MDM: CPT

## 2023-10-27 PROCEDURE — 93005 ELECTROCARDIOGRAM TRACING: CPT | Performed by: EMERGENCY MEDICINE

## 2023-10-27 ASSESSMENT — PAIN - FUNCTIONAL ASSESSMENT: PAIN_FUNCTIONAL_ASSESSMENT: 0-10

## 2023-10-27 ASSESSMENT — PAIN SCALES - GENERAL: PAINLEVEL_OUTOF10: 7

## 2023-10-28 ENCOUNTER — APPOINTMENT (OUTPATIENT)
Dept: GENERAL RADIOLOGY | Age: 68
DRG: 175 | End: 2023-10-28
Payer: MEDICARE

## 2023-10-28 ENCOUNTER — APPOINTMENT (OUTPATIENT)
Dept: CT IMAGING | Age: 68
DRG: 175 | End: 2023-10-28
Payer: MEDICARE

## 2023-10-28 PROBLEM — E11.9 TYPE 2 DIABETES MELLITUS (HCC): Status: ACTIVE | Noted: 2023-10-28

## 2023-10-28 PROBLEM — I26.99 PULMONARY EMBOLUS, LEFT (HCC): Status: ACTIVE | Noted: 2023-10-28

## 2023-10-28 PROBLEM — I10 HTN (HYPERTENSION): Status: ACTIVE | Noted: 2023-10-28

## 2023-10-28 PROBLEM — N18.6 ESRD ON HEMODIALYSIS (HCC): Status: ACTIVE | Noted: 2023-10-28

## 2023-10-28 PROBLEM — Z99.2 ESRD ON HEMODIALYSIS (HCC): Status: ACTIVE | Noted: 2023-10-28

## 2023-10-28 PROBLEM — E87.70 VOLUME OVERLOAD: Status: ACTIVE | Noted: 2023-10-28

## 2023-10-28 LAB
ALBUMIN SERPL-MCNC: 3.8 G/DL (ref 3.4–5)
ALBUMIN/GLOB SERPL: 1.7 {RATIO} (ref 1.1–2.2)
ALP SERPL-CCNC: 156 U/L (ref 40–129)
ALT SERPL-CCNC: 8 U/L (ref 10–40)
ANION GAP SERPL CALCULATED.3IONS-SCNC: 11 MMOL/L (ref 3–16)
ANTI-XA UNFRAC HEPARIN: 0.72 IU/ML (ref 0.3–0.7)
ANTI-XA UNFRAC HEPARIN: <0.1 IU/ML (ref 0.3–0.7)
ANTI-XA UNFRAC HEPARIN: <0.1 IU/ML (ref 0.3–0.7)
APTT BLD: 45.1 SEC (ref 22.7–35.9)
AST SERPL-CCNC: 21 U/L (ref 15–37)
BASOPHILS # BLD: 0 K/UL (ref 0–0.2)
BASOPHILS NFR BLD: 1 %
BILIRUB SERPL-MCNC: 0.9 MG/DL (ref 0–1)
BUN SERPL-MCNC: 17 MG/DL (ref 7–20)
CALCIUM SERPL-MCNC: 8.4 MG/DL (ref 8.3–10.6)
CHLORIDE SERPL-SCNC: 96 MMOL/L (ref 99–110)
CO2 SERPL-SCNC: 27 MMOL/L (ref 21–32)
CREAT SERPL-MCNC: 2.7 MG/DL (ref 0.6–1.2)
D DIMER: 3.36 UG/ML FEU (ref 0–0.6)
DEPRECATED RDW RBC AUTO: 21.4 % (ref 12.4–15.4)
EKG ATRIAL RATE: 66 BPM
EKG DIAGNOSIS: NORMAL
EKG Q-T INTERVAL: 486 MS
EKG QRS DURATION: 182 MS
EKG QTC CALCULATION (BAZETT): 532 MS
EKG R AXIS: -74 DEGREES
EKG T AXIS: 97 DEGREES
EKG VENTRICULAR RATE: 72 BPM
EOSINOPHIL # BLD: 0.1 K/UL (ref 0–0.6)
EOSINOPHIL NFR BLD: 3 %
GFR SERPLBLD CREATININE-BSD FMLA CKD-EPI: 18 ML/MIN/{1.73_M2}
GLUCOSE BLD-MCNC: 114 MG/DL (ref 70–99)
GLUCOSE BLD-MCNC: 125 MG/DL (ref 70–99)
GLUCOSE BLD-MCNC: 153 MG/DL (ref 70–99)
GLUCOSE SERPL-MCNC: 120 MG/DL (ref 70–99)
HCT VFR BLD AUTO: 27.9 % (ref 36–48)
HGB BLD-MCNC: 8.9 G/DL (ref 12–16)
INR PPP: 1.23 (ref 0.84–1.16)
LYMPHOCYTES # BLD: 0.6 K/UL (ref 1–5.1)
LYMPHOCYTES NFR BLD: 14.3 %
MCH RBC QN AUTO: 31 PG (ref 26–34)
MCHC RBC AUTO-ENTMCNC: 32 G/DL (ref 31–36)
MCV RBC AUTO: 96.9 FL (ref 80–100)
MONOCYTES # BLD: 0.3 K/UL (ref 0–1.3)
MONOCYTES NFR BLD: 8 %
NEUTROPHILS # BLD: 3 K/UL (ref 1.7–7.7)
NEUTROPHILS NFR BLD: 73.7 %
NT-PROBNP SERPL-MCNC: ABNORMAL PG/ML (ref 0–124)
PERFORMED ON: ABNORMAL
PLATELET # BLD AUTO: 118 K/UL (ref 135–450)
PMV BLD AUTO: 7.7 FL (ref 5–10.5)
POTASSIUM SERPL-SCNC: 4.5 MMOL/L (ref 3.5–5.1)
PROT SERPL-MCNC: 6 G/DL (ref 6.4–8.2)
PROTHROMBIN TIME: 15.5 SEC (ref 11.5–14.8)
RBC # BLD AUTO: 2.88 M/UL (ref 4–5.2)
REASON FOR REJECTION: NORMAL
REASON FOR REJECTION: NORMAL
REJECTED TEST: NORMAL
REJECTED TEST: NORMAL
SODIUM SERPL-SCNC: 134 MMOL/L (ref 136–145)
TROPONIN, HIGH SENSITIVITY: 160 NG/L (ref 0–14)
TROPONIN, HIGH SENSITIVITY: 168 NG/L (ref 0–14)
WBC # BLD AUTO: 4.1 K/UL (ref 4–11)

## 2023-10-28 PROCEDURE — 83880 ASSAY OF NATRIURETIC PEPTIDE: CPT

## 2023-10-28 PROCEDURE — 6360000002 HC RX W HCPCS: Performed by: EMERGENCY MEDICINE

## 2023-10-28 PROCEDURE — 6370000000 HC RX 637 (ALT 250 FOR IP): Performed by: INTERNAL MEDICINE

## 2023-10-28 PROCEDURE — 6360000004 HC RX CONTRAST MEDICATION: Performed by: NURSE PRACTITIONER

## 2023-10-28 PROCEDURE — 84484 ASSAY OF TROPONIN QUANT: CPT

## 2023-10-28 PROCEDURE — 96365 THER/PROPH/DIAG IV INF INIT: CPT

## 2023-10-28 PROCEDURE — 93010 ELECTROCARDIOGRAM REPORT: CPT | Performed by: INTERNAL MEDICINE

## 2023-10-28 PROCEDURE — 80053 COMPREHEN METABOLIC PANEL: CPT

## 2023-10-28 PROCEDURE — 36556 INSERT NON-TUNNEL CV CATH: CPT

## 2023-10-28 PROCEDURE — 71260 CT THORAX DX C+: CPT

## 2023-10-28 PROCEDURE — 96366 THER/PROPH/DIAG IV INF ADDON: CPT

## 2023-10-28 PROCEDURE — 6360000002 HC RX W HCPCS: Performed by: NURSE PRACTITIONER

## 2023-10-28 PROCEDURE — 85379 FIBRIN DEGRADATION QUANT: CPT

## 2023-10-28 PROCEDURE — 85520 HEPARIN ASSAY: CPT

## 2023-10-28 PROCEDURE — 2700000000 HC OXYGEN THERAPY PER DAY

## 2023-10-28 PROCEDURE — 6370000000 HC RX 637 (ALT 250 FOR IP): Performed by: EMERGENCY MEDICINE

## 2023-10-28 PROCEDURE — 85730 THROMBOPLASTIN TIME PARTIAL: CPT

## 2023-10-28 PROCEDURE — 36415 COLL VENOUS BLD VENIPUNCTURE: CPT

## 2023-10-28 PROCEDURE — 96375 TX/PRO/DX INJ NEW DRUG ADDON: CPT

## 2023-10-28 PROCEDURE — 85025 COMPLETE CBC W/AUTO DIFF WBC: CPT

## 2023-10-28 PROCEDURE — 6370000000 HC RX 637 (ALT 250 FOR IP): Performed by: NURSE PRACTITIONER

## 2023-10-28 PROCEDURE — 2580000003 HC RX 258: Performed by: INTERNAL MEDICINE

## 2023-10-28 PROCEDURE — 1200000000 HC SEMI PRIVATE

## 2023-10-28 PROCEDURE — 94761 N-INVAS EAR/PLS OXIMETRY MLT: CPT

## 2023-10-28 PROCEDURE — 71045 X-RAY EXAM CHEST 1 VIEW: CPT

## 2023-10-28 PROCEDURE — 83036 HEMOGLOBIN GLYCOSYLATED A1C: CPT

## 2023-10-28 PROCEDURE — 94640 AIRWAY INHALATION TREATMENT: CPT

## 2023-10-28 PROCEDURE — 85610 PROTHROMBIN TIME: CPT

## 2023-10-28 RX ORDER — METHOCARBAMOL 500 MG/1
500 TABLET, FILM COATED ORAL 2 TIMES DAILY
Status: DISCONTINUED | OUTPATIENT
Start: 2023-10-28 | End: 2023-11-02 | Stop reason: HOSPADM

## 2023-10-28 RX ORDER — LEVOTHYROXINE SODIUM 0.1 MG/1
100 TABLET ORAL EVERY MORNING
Status: DISCONTINUED | OUTPATIENT
Start: 2023-10-28 | End: 2023-11-02 | Stop reason: HOSPADM

## 2023-10-28 RX ORDER — ASPIRIN 81 MG/1
81 TABLET, CHEWABLE ORAL DAILY
Status: DISCONTINUED | OUTPATIENT
Start: 2023-10-28 | End: 2023-11-02 | Stop reason: HOSPADM

## 2023-10-28 RX ORDER — HYDROCORTISONE ACETATE 25 MG/1
25 SUPPOSITORY RECTAL DAILY PRN
Status: DISCONTINUED | OUTPATIENT
Start: 2023-10-28 | End: 2023-11-02 | Stop reason: HOSPADM

## 2023-10-28 RX ORDER — CLINDAMYCIN HYDROCHLORIDE 300 MG/1
300 CAPSULE ORAL 3 TIMES DAILY
Qty: 21 CAPSULE | Refills: 0 | Status: ON HOLD | COMMUNITY
Start: 2023-10-26 | End: 2023-11-02 | Stop reason: HOSPADM

## 2023-10-28 RX ORDER — POLYETHYLENE GLYCOL 3350 17 G/17G
17 POWDER, FOR SOLUTION ORAL DAILY
Status: DISCONTINUED | OUTPATIENT
Start: 2023-10-28 | End: 2023-11-02 | Stop reason: HOSPADM

## 2023-10-28 RX ORDER — ALBUTEROL SULFATE 2.5 MG/3ML
2.5 SOLUTION RESPIRATORY (INHALATION)
Status: DISCONTINUED | OUTPATIENT
Start: 2023-10-29 | End: 2023-11-02 | Stop reason: HOSPADM

## 2023-10-28 RX ORDER — DEXTROSE MONOHYDRATE 100 MG/ML
INJECTION, SOLUTION INTRAVENOUS CONTINUOUS PRN
Status: DISCONTINUED | OUTPATIENT
Start: 2023-10-28 | End: 2023-11-02 | Stop reason: HOSPADM

## 2023-10-28 RX ORDER — SODIUM CHLORIDE 0.9 % (FLUSH) 0.9 %
5-40 SYRINGE (ML) INJECTION PRN
Status: DISCONTINUED | OUTPATIENT
Start: 2023-10-28 | End: 2023-11-02 | Stop reason: HOSPADM

## 2023-10-28 RX ORDER — ALBUTEROL SULFATE 2.5 MG/3ML
2.5 SOLUTION RESPIRATORY (INHALATION) EVERY 6 HOURS PRN
Status: DISCONTINUED | OUTPATIENT
Start: 2023-10-28 | End: 2023-10-28

## 2023-10-28 RX ORDER — HEPARIN SODIUM 1000 [USP'U]/ML
40 INJECTION, SOLUTION INTRAVENOUS; SUBCUTANEOUS PRN
Status: DISCONTINUED | OUTPATIENT
Start: 2023-10-28 | End: 2023-10-30

## 2023-10-28 RX ORDER — GABAPENTIN 100 MG/1
100 CAPSULE ORAL
COMMUNITY
Start: 2023-09-16

## 2023-10-28 RX ORDER — LIDOCAINE 4 G/G
1 PATCH TOPICAL DAILY
Status: DISCONTINUED | OUTPATIENT
Start: 2023-10-28 | End: 2023-11-02 | Stop reason: HOSPADM

## 2023-10-28 RX ORDER — ACETAMINOPHEN 650 MG/1
650 SUPPOSITORY RECTAL EVERY 6 HOURS PRN
Status: DISCONTINUED | OUTPATIENT
Start: 2023-10-28 | End: 2023-10-29

## 2023-10-28 RX ORDER — METHOCARBAMOL 500 MG/1
750 TABLET, FILM COATED ORAL ONCE
Status: COMPLETED | OUTPATIENT
Start: 2023-10-28 | End: 2023-10-28

## 2023-10-28 RX ORDER — MORPHINE SULFATE 4 MG/ML
4 INJECTION, SOLUTION INTRAMUSCULAR; INTRAVENOUS ONCE
Status: COMPLETED | OUTPATIENT
Start: 2023-10-28 | End: 2023-10-28

## 2023-10-28 RX ORDER — ONDANSETRON 4 MG/1
4 TABLET, ORALLY DISINTEGRATING ORAL EVERY 8 HOURS PRN
Status: DISCONTINUED | OUTPATIENT
Start: 2023-10-28 | End: 2023-11-02 | Stop reason: HOSPADM

## 2023-10-28 RX ORDER — MELOXICAM 7.5 MG/1
15 TABLET ORAL DAILY
Status: DISCONTINUED | OUTPATIENT
Start: 2023-10-28 | End: 2023-11-02 | Stop reason: HOSPADM

## 2023-10-28 RX ORDER — OMEPRAZOLE 20 MG/1
20 CAPSULE, DELAYED RELEASE ORAL DAILY
COMMUNITY

## 2023-10-28 RX ORDER — CETIRIZINE HYDROCHLORIDE 10 MG/1
10 TABLET ORAL DAILY
Status: DISCONTINUED | OUTPATIENT
Start: 2023-10-28 | End: 2023-11-02 | Stop reason: HOSPADM

## 2023-10-28 RX ORDER — SODIUM CHLORIDE 9 MG/ML
INJECTION, SOLUTION INTRAVENOUS PRN
Status: DISCONTINUED | OUTPATIENT
Start: 2023-10-28 | End: 2023-11-02 | Stop reason: HOSPADM

## 2023-10-28 RX ORDER — ALLOPURINOL 300 MG/1
300 TABLET ORAL DAILY
Status: DISCONTINUED | OUTPATIENT
Start: 2023-10-28 | End: 2023-11-02 | Stop reason: HOSPADM

## 2023-10-28 RX ORDER — OXYCODONE HYDROCHLORIDE AND ACETAMINOPHEN 5; 325 MG/1; MG/1
1 TABLET ORAL 2 TIMES DAILY
Status: DISCONTINUED | OUTPATIENT
Start: 2023-10-28 | End: 2023-10-29

## 2023-10-28 RX ORDER — ALPRAZOLAM 0.25 MG/1
0.25 TABLET ORAL 3 TIMES DAILY PRN
Status: DISCONTINUED | OUTPATIENT
Start: 2023-10-28 | End: 2023-11-02 | Stop reason: HOSPADM

## 2023-10-28 RX ORDER — ACETAMINOPHEN 325 MG/1
650 TABLET ORAL EVERY 6 HOURS PRN
Status: DISCONTINUED | OUTPATIENT
Start: 2023-10-28 | End: 2023-10-29

## 2023-10-28 RX ORDER — HEPARIN SODIUM 10000 [USP'U]/100ML
5-30 INJECTION, SOLUTION INTRAVENOUS CONTINUOUS
Status: DISCONTINUED | OUTPATIENT
Start: 2023-10-28 | End: 2023-10-29

## 2023-10-28 RX ORDER — POLYETHYLENE GLYCOL 3350 17 G/17G
17 POWDER, FOR SOLUTION ORAL DAILY PRN
Status: DISCONTINUED | OUTPATIENT
Start: 2023-10-28 | End: 2023-11-02 | Stop reason: HOSPADM

## 2023-10-28 RX ORDER — ALBUTEROL SULFATE 2.5 MG/3ML
2.5 SOLUTION RESPIRATORY (INHALATION) EVERY 4 HOURS PRN
Status: DISCONTINUED | OUTPATIENT
Start: 2023-10-28 | End: 2023-11-02 | Stop reason: HOSPADM

## 2023-10-28 RX ORDER — BUDESONIDE AND FORMOTEROL FUMARATE DIHYDRATE 80; 4.5 UG/1; UG/1
2 AEROSOL RESPIRATORY (INHALATION) 2 TIMES DAILY
Status: DISCONTINUED | OUTPATIENT
Start: 2023-10-28 | End: 2023-10-28 | Stop reason: ALTCHOICE

## 2023-10-28 RX ORDER — LACTULOSE 10 G/15ML
10 SOLUTION ORAL; RECTAL SEE ADMIN INSTRUCTIONS
COMMUNITY
Start: 2023-10-11

## 2023-10-28 RX ORDER — HEPARIN SODIUM 1000 [USP'U]/ML
80 INJECTION, SOLUTION INTRAVENOUS; SUBCUTANEOUS PRN
Status: DISCONTINUED | OUTPATIENT
Start: 2023-10-28 | End: 2023-10-28

## 2023-10-28 RX ORDER — INSULIN LISPRO 100 [IU]/ML
0-4 INJECTION, SOLUTION INTRAVENOUS; SUBCUTANEOUS NIGHTLY
Status: DISCONTINUED | OUTPATIENT
Start: 2023-10-28 | End: 2023-11-02 | Stop reason: HOSPADM

## 2023-10-28 RX ORDER — HEPARIN SODIUM 1000 [USP'U]/ML
40 INJECTION, SOLUTION INTRAVENOUS; SUBCUTANEOUS PRN
Status: DISCONTINUED | OUTPATIENT
Start: 2023-10-28 | End: 2023-10-28

## 2023-10-28 RX ORDER — HEPARIN SODIUM 1000 [USP'U]/ML
80 INJECTION, SOLUTION INTRAVENOUS; SUBCUTANEOUS ONCE
Status: COMPLETED | OUTPATIENT
Start: 2023-10-28 | End: 2023-10-28

## 2023-10-28 RX ORDER — SENNA AND DOCUSATE SODIUM 50; 8.6 MG/1; MG/1
1 TABLET, FILM COATED ORAL DAILY
Status: DISCONTINUED | OUTPATIENT
Start: 2023-10-28 | End: 2023-11-02 | Stop reason: HOSPADM

## 2023-10-28 RX ORDER — LANOLIN ALCOHOL/MO/W.PET/CERES
3 CREAM (GRAM) TOPICAL NIGHTLY PRN
Status: DISCONTINUED | OUTPATIENT
Start: 2023-10-28 | End: 2023-11-02 | Stop reason: HOSPADM

## 2023-10-28 RX ORDER — INSULIN LISPRO 100 [IU]/ML
0-4 INJECTION, SOLUTION INTRAVENOUS; SUBCUTANEOUS
Status: DISCONTINUED | OUTPATIENT
Start: 2023-10-28 | End: 2023-11-02 | Stop reason: HOSPADM

## 2023-10-28 RX ORDER — HYDROXYZINE HYDROCHLORIDE 25 MG/1
25 TABLET, FILM COATED ORAL 3 TIMES DAILY PRN
Status: DISCONTINUED | OUTPATIENT
Start: 2023-10-28 | End: 2023-11-02 | Stop reason: HOSPADM

## 2023-10-28 RX ORDER — ONDANSETRON 2 MG/ML
4 INJECTION INTRAMUSCULAR; INTRAVENOUS EVERY 6 HOURS PRN
Status: DISCONTINUED | OUTPATIENT
Start: 2023-10-28 | End: 2023-11-02 | Stop reason: HOSPADM

## 2023-10-28 RX ORDER — SODIUM CHLORIDE 0.9 % (FLUSH) 0.9 %
5-40 SYRINGE (ML) INJECTION EVERY 12 HOURS SCHEDULED
Status: DISCONTINUED | OUTPATIENT
Start: 2023-10-28 | End: 2023-11-02 | Stop reason: HOSPADM

## 2023-10-28 RX ORDER — FERROUS SULFATE 325(65) MG
325 TABLET ORAL
Status: DISCONTINUED | OUTPATIENT
Start: 2023-10-28 | End: 2023-11-02 | Stop reason: HOSPADM

## 2023-10-28 RX ORDER — HEPARIN SODIUM 1000 [USP'U]/ML
80 INJECTION, SOLUTION INTRAVENOUS; SUBCUTANEOUS PRN
Status: DISCONTINUED | OUTPATIENT
Start: 2023-10-28 | End: 2023-10-30

## 2023-10-28 RX ORDER — ACETAMINOPHEN 500 MG
1000 TABLET ORAL ONCE
Status: COMPLETED | OUTPATIENT
Start: 2023-10-28 | End: 2023-10-28

## 2023-10-28 RX ORDER — ATORVASTATIN CALCIUM 40 MG/1
40 TABLET, FILM COATED ORAL NIGHTLY
Status: DISCONTINUED | OUTPATIENT
Start: 2023-10-28 | End: 2023-11-02 | Stop reason: HOSPADM

## 2023-10-28 RX ORDER — ACETAMINOPHEN 325 MG/1
650 TABLET ORAL EVERY 6 HOURS PRN
COMMUNITY

## 2023-10-28 RX ADMIN — METHOCARBAMOL 750 MG: 500 TABLET ORAL at 00:45

## 2023-10-28 RX ADMIN — METOPROLOL TARTRATE 25 MG: 25 TABLET, FILM COATED ORAL at 21:27

## 2023-10-28 RX ADMIN — ASPIRIN 81 MG 81 MG: 81 TABLET ORAL at 13:01

## 2023-10-28 RX ADMIN — METHOCARBAMOL 500 MG: 500 TABLET ORAL at 13:00

## 2023-10-28 RX ADMIN — METOPROLOL TARTRATE 25 MG: 25 TABLET, FILM COATED ORAL at 13:01

## 2023-10-28 RX ADMIN — HEPARIN SODIUM 17 UNITS/KG/HR: 10000 INJECTION, SOLUTION INTRAVENOUS at 07:15

## 2023-10-28 RX ADMIN — STANDARDIZED SENNA CONCENTRATE AND DOCUSATE SODIUM 1 TABLET: 8.6; 5 TABLET ORAL at 13:01

## 2023-10-28 RX ADMIN — POLYETHYLENE GLYCOL 3350 17 G: 17 POWDER, FOR SOLUTION ORAL at 13:02

## 2023-10-28 RX ADMIN — Medication 10 ML: at 21:16

## 2023-10-28 RX ADMIN — OXYCODONE HYDROCHLORIDE AND ACETAMINOPHEN 1 TABLET: 5; 325 TABLET ORAL at 21:27

## 2023-10-28 RX ADMIN — HEPARIN SODIUM 9830 UNITS: 1000 INJECTION INTRAVENOUS; SUBCUTANEOUS at 07:11

## 2023-10-28 RX ADMIN — Medication 10 ML: at 13:02

## 2023-10-28 RX ADMIN — MORPHINE SULFATE 4 MG: 4 INJECTION, SOLUTION INTRAMUSCULAR; INTRAVENOUS at 00:46

## 2023-10-28 RX ADMIN — ALLOPURINOL 300 MG: 300 TABLET ORAL at 13:00

## 2023-10-28 RX ADMIN — ACETAMINOPHEN 1000 MG: 500 TABLET ORAL at 08:38

## 2023-10-28 RX ADMIN — IOPAMIDOL 75 ML: 755 INJECTION, SOLUTION INTRAVENOUS at 02:54

## 2023-10-28 RX ADMIN — MELOXICAM 15 MG: 7.5 TABLET ORAL at 13:01

## 2023-10-28 RX ADMIN — LEVOTHYROXINE SODIUM 100 MCG: 0.1 TABLET ORAL at 13:02

## 2023-10-28 RX ADMIN — FERROUS SULFATE TAB 325 MG (65 MG ELEMENTAL FE) 325 MG: 325 (65 FE) TAB at 13:00

## 2023-10-28 RX ADMIN — Medication 2 PUFF: at 22:26

## 2023-10-28 RX ADMIN — LINACLOTIDE 290 MCG: 145 CAPSULE, GELATIN COATED ORAL at 13:02

## 2023-10-28 RX ADMIN — MELATONIN TAB 3 MG 3 MG: 3 TAB at 21:27

## 2023-10-28 RX ADMIN — ALBUTEROL SULFATE 2.5 MG: 2.5 SOLUTION RESPIRATORY (INHALATION) at 09:03

## 2023-10-28 RX ADMIN — OXYCODONE HYDROCHLORIDE AND ACETAMINOPHEN 1 TABLET: 5; 325 TABLET ORAL at 13:01

## 2023-10-28 RX ADMIN — ATORVASTATIN CALCIUM 40 MG: 40 TABLET, FILM COATED ORAL at 21:27

## 2023-10-28 RX ADMIN — CETIRIZINE HYDROCHLORIDE 10 MG: 10 TABLET, FILM COATED ORAL at 13:01

## 2023-10-28 RX ADMIN — ALBUTEROL SULFATE 2.5 MG: 2.5 SOLUTION RESPIRATORY (INHALATION) at 22:48

## 2023-10-28 ASSESSMENT — PAIN SCALES - GENERAL
PAINLEVEL_OUTOF10: 5
PAINLEVEL_OUTOF10: 0
PAINLEVEL_OUTOF10: 7

## 2023-10-28 ASSESSMENT — ENCOUNTER SYMPTOMS
DIARRHEA: 0
SHORTNESS OF BREATH: 0
BACK PAIN: 1
CHEST TIGHTNESS: 0
NAUSEA: 0
ABDOMINAL PAIN: 0
VOMITING: 0

## 2023-10-28 ASSESSMENT — PAIN DESCRIPTION - LOCATION
LOCATION: BACK
LOCATION: BACK

## 2023-10-28 ASSESSMENT — PAIN DESCRIPTION - ORIENTATION: ORIENTATION: RIGHT;LEFT

## 2023-10-28 ASSESSMENT — PAIN - FUNCTIONAL ASSESSMENT: PAIN_FUNCTIONAL_ASSESSMENT: PREVENTS OR INTERFERES SOME ACTIVE ACTIVITIES AND ADLS

## 2023-10-28 ASSESSMENT — PAIN DESCRIPTION - DESCRIPTORS: DESCRIPTORS: DISCOMFORT

## 2023-10-28 ASSESSMENT — PAIN SCALES - WONG BAKER: WONGBAKER_NUMERICALRESPONSE: 2

## 2023-10-28 NOTE — ED PROVIDER NOTES
EMERGENCY DEPARTMENT SUPERVISING PHYSICIAN NOTE    I have seen this patient & have reviewed history and findings with the PA, NP, or resident physician and provided direct personal supervision as needed. I was present for key portions of any procedures performed. I concur with their assessment and plans except for any discrepancies noted below. I've participated in medical management, monitoring, and treatment of this patient with the provider. I have reviewed documentation, test results, and laboratory results in the interim. Care plan has been developed collaboratively. Zahira Gao is a 70y.o. year old female presenting to the emergency department for Shoulder Pain (Pt via EMS from [facility]) care, c/o right scapula pain 8/10 sudden onset at 10 pm, denies injury, has pacemaker and cardiac hx, denies cp and sob, baseline 3 L oxygen)    Brief HPI:  72yrs F suddenly developed intense, sharp pain to upper back late last night  Pain is worse with deep breathing and she also feels a little short of breath  Has ESRD, indwelling hemodialysis catheter, cardiac pacemaker    Pertinent Exam Findings:  Awake, alert, chronically ill-appearing, not distressed  RR and WOB are normal  Some peripheral edema present    CT CHEST PULMONARY EMBOLISM W CONTRAST   Final Result   1. Small pulmonary arterial embolism in anterior superior segmental branch   of the left upper lobe. No other pulmonary arterial emboli are seen in the   left lung or in the right lung. No evidence of right heart strain. 2.  Dilated cardiomyopathy is present. There is a small amount of   pericardial effusion but the volume is likely not significant. 3.  Small bilateral pleural effusions and mild congestive changes in the   lungs. There is likely a component of bronchitis/bronchiolitis as well. 4.  Ascites at the small portion of upper abdomen.       Critical results were called by Dr. Karli Sagastume MD to Dr. Ede Girard on   10/28/2023 at

## 2023-10-28 NOTE — PROGRESS NOTES
Patient arrived to room 5580 from ER. Patient oriented to room and call light. Fall precautions in place. Call light and bedside table within reach. Nonskid footwear on. Will continue to monitor.

## 2023-10-28 NOTE — PROGRESS NOTES
IV site bleeding. Heparin gtt stopped. Rn attempted x2 to place and IV without success. DIT called to place PIV.

## 2023-10-28 NOTE — H&P
Hospital Medicine History & Physical      PCP: Leandro Rivas MD    Date of Admission: 10/27/2023    Date of Service: Pt seen/examined on 10/28/2023 and Admitted to Inpatient with expected LOS greater than two midnights due to medical therapy. Chief Complaint:    Chief Complaint   Patient presents with    Shoulder Pain     Pt via EMS from [facility], c/o right scapula pain 8/10 sudden onset at 10 pm, denies injury, has pacemaker and cardiac hx, denies cp and sob, baseline 3 L oxygen         History Of Present Illness: The patient is a 70 y.o. female with history of end-stage renal disease on hemodialysis, hypertension, hyperlipidemia, history of chronic diastolic heart failure, type 2 diabetes with neuropathy, paroxysmal atrial fibrillation, history of ASD with foramen ovale, hypothyroidism, history of left BKA  who presented with left upper back pleuritic chest pain with associated mild shortness of breath. No fevers or chills. No cough or production. .  Patient also noted right lower extremity swelling. No orthopnea or PND. Mild shortness of breath. She is at baseline home O2 use of 3 L. CT pulmonary angiogram was noted for left upper lobe segmental pulmonary emboli with mild pericardial effusion, pleural effusion and ascites. She has baseline troponin leak.     Past Medical History:        Diagnosis Date    Anemia     Atrial septal defect within oval fossa     CHB (complete heart block) ([facility])     Chronic diastolic HF (heart failure) ([facility])     Chronic left-sided low back pain     Chronic respiratory failure with hypoxia ([facility])     Diabetic foot infection ([facility])     DM2 (diabetes mellitus, type 2) ([facility])     ESRD (end stage renal disease) ([facility])     HLD (hyperlipidemia)     HTN (hypertension)     Hypothyroidism     MILE on CPAP     PAF (paroxysmal atrial fibrillation) ([facility])     Polyneuropathy        Past Surgical History:        Procedure Laterality Date    PACEMAKER INSERTION         Medications

## 2023-10-28 NOTE — ED PROVIDER NOTES
[facility]        Pt Name: Elwanda Osgood  MRN: [id number]  [address] [facility] [place] 1955  Date of evaluation: 10/27/2023  Provider: FIDENCIO Hills - CNP  PCP: Myesha Montemayor MD  Note Started: 12:55 AM EDT 10/28/23       I have seen and evaluated this patient with my supervising physician [id number] [facility]       Chief Complaint   Patient presents with    Shoulder Pain     Pt via EMS from [facility], c/o right scapula pain 8/10 sudden onset at 10 pm, denies injury, has pacemaker and cardiac hx, denies cp and sob, baseline 3 L oxygen       HISTORY OF PRESENT ILLNESS: 1 or more Elements     History from : Patient    Limitations to history : None    Elwanda Osgood is a 70 y.o. female who presents to the emergency department with complaint of pain to the right scapula that she rates as 8 of 10. Sudden onset of symptoms while sitting on the bedside commode and trying to bear down for a bowel movement. The patient denies any chest pain or shortness of breath. States that staff at the nursing home did apply a salve as well as give her her nightly pain medication without relief of symptoms. Reports that the pain feels \"muscular\" in nature and that she is unable to get comfortable. She does appear very uncomfortable, unable to sit back. She did have a full session of dialysis today without complication. Denies any headache, fever, lightheadedness, dizziness, visual disturbances. No chest pain or pressure. No neck pain. No shortness of breath, cough, or congestion. No abdominal pain, nausea, vomiting, diarrhea, constipation, or dysuria. No rash. Nursing Notes were all reviewed and agreed with or any disagreements were addressed in the HPI. REVIEW OF SYSTEMS :      Review of Systems   Constitutional:  Negative for activity change, chills and fever. Respiratory:  Negative for chest tightness and shortness of breath.     Cardiovascular:  Negative

## 2023-10-28 NOTE — CONSULTS
[facility]   CONSULT NOTE    10/28/2023 3:43 PM    Patient Information: Manuel Sin   Date of Admit:  10/27/2023  Primary Care Physician:  Sebastián Cevallos MD  Requesting Physician:  Rai Main MD    Reason for consult:    Pulmonary embolus    Chief complaint:    Pulmonary embolus     History of Present Illness:    42-year-old female with multiple medical comorbidities including heart block, heart failure, ESRD on hemodialysis, type 2 diabetes, hyperlipidemia, hypertension, hypothyroidism who presents to the hospital for chest pain. Upon presenting to the hospital she underwent a CT scan which showed a small pulmonary arterial embolism in the anterior superior segmental branch of the left upper lobe. Hematology/oncology was consulted for this pulmonary embolism     Past Medical History:     has a past medical history of Anemia, Atrial septal defect within oval fossa, CHB (complete heart block) (HCC), Chronic diastolic HF (heart failure) (HCC), Chronic left-sided low back pain, Chronic respiratory failure with hypoxia ([address]), Diabetic foot infection ([address]), DM2 (diabetes mellitus, type 2) ([address]), ESRD (end stage renal disease) ([address]), HLD (hyperlipidemia), HTN (hypertension), Hypothyroidism, MILE on CPAP, PAF (paroxysmal atrial fibrillation) ([address]), and Polyneuropathy.          Past Surgical History:    Past Surgical History:   Procedure Laterality Date    PACEMAKER INSERTION              Current Medications:     lidocaine  1 patch TransDERmal Daily    allopurinol  300 mg Oral Daily    aspirin  81 mg Oral Daily    atorvastatin  40 mg Oral Nightly    cetirizine  10 mg Oral Daily    ferrous sulfate  325 mg Oral Lunch    levothyroxine  100 mcg Oral QAM    linaclotide  290 mcg Oral Daily    meloxicam  15 mg Oral Daily    methocarbamol  500 mg Oral BID    metoprolol tartrate  25 mg Oral BID    oxyCODONE-acetaminophen  1 tablet Oral BID    polyethylene glycol  17 g Oral Daily    sennosides-docusate CALCIUM 8.4 10/28/2023 03:15 AM    LABGLOM 18 10/28/2023 03:15 AM    GLUCOSE 120 10/28/2023 03:15 AM     Magnesium: No results found for: \"MG\"  LIVER PROFILE:   Recent Labs     10/28/23  0315   AST 21   ALT 8*   BILITOT 0.9   ALKPHOS 156*     PT/INR:    Lab Results   Component Value Date/Time    PROTIME 15.5 10/28/2023 06:41 AM    INR 1.23 10/28/2023 06:41 AM           IMPRESSION/RECOMMENDATIONS:      65-year-old female with multiple medical comorbidities including heart block, heart failure, ESRD on hemodialysis, type 2 diabetes, hyperlipidemia, hypertension, hypothyroidism who presents to the hospital for chest pain. Hematology/oncology consulted for PE    #Pulmonary embolism without heart strain  -Developed pleuritic chest pain at home  -Subsequently presented to the ER  -CT PE which demonstrated a pulmonary embolus in the left upper lobe subsegmental branch  -Most likely this is provoked given her immobility left BKA  -Recommend 6 months of anticoagulation.  -She can be put on Eliquis 5 mg twice daily as it is safe in ESRD patients  -Follow-up with me in the outpatient setting for monitoring and management of her anticoagulation    #Hypertension    #Hyperlipidemia    #ESRD on HD    #Type 2 diabetes      Doron Kiser MD  [facility]

## 2023-10-28 NOTE — ED NOTES
ED TO INPATIENT SBAR HANDOFF    Patient Name: Tian Puentes   :  1955  70 y.o. MRN:  [id number]  Preferred Name   Kanu   ED Room #:  ED-0010/10  Family/Caregiver Present no   Restraints no   Sitter no   Sepsis Risk Score Sepsis Risk Score: 0.88    Situation  Code Status: Prior No additional code details. Allergies: Patient has no known allergies. Weight: Patient Vitals for the past 96 hrs (Last 3 readings):   Weight   10/27/23 2339 122.9 kg (271 lb)     Arrived from: nursing home  Chief Complaint:   Chief Complaint   Patient presents with    Shoulder Pain     Pt via EMS from [facility], c/o right scapula pain 8/10 sudden onset at 10 pm, denies injury, has pacemaker and cardiac hx, denies cp and sob, baseline 3 L oxygen     Hospital Problem/Diagnosis:  Active Problems:    * No active hospital problems. *  Resolved Problems:    * No resolved hospital problems. *    Imaging:   CT CHEST PULMONARY EMBOLISM W CONTRAST   Final Result   1. Small pulmonary arterial embolism in anterior superior segmental branch   of the left upper lobe. No other pulmonary arterial emboli are seen in the   left lung or in the right lung. No evidence of right heart strain. 2.  Dilated cardiomyopathy is present. There is a small amount of   pericardial effusion but the volume is likely not significant. 3.  Small bilateral pleural effusions and mild congestive changes in the   lungs. There is likely a component of bronchitis/bronchiolitis as well. 4.  Ascites at the small portion of upper abdomen. Critical results were called by Dr. Ronald Reid MD to Dr. Loyola Heimlich on   10/28/2023 at 05:03. XR CHEST PORTABLE   Final Result   1. Cardiac silhouette remains enlarged with pulmonary vascular congestion   and features of pulmonary edema. Could be cardiac decompensation or and or   volume overload. 2.  There is improved inflation of the lung bases and or decrease of previous   pleural effusions. Abnormal labs:   Abnormal Labs Reviewed   CBC WITH AUTO DIFFERENTIAL - Abnormal; Notable for the following components:       Result Value    RBC 2.88 (*)     Hemoglobin 8.9 (*)     Hematocrit 27.9 (*)     RDW 21.4 (*)     Platelets 703 (*)     Lymphocytes Absolute 0.6 (*)     All other components within normal limits   D-DIMER, QUANTITATIVE - Abnormal; Notable for the following components:    D-Dimer, Quant 3.36 (*)     All other components within normal limits   COMPREHENSIVE METABOLIC PANEL - Abnormal; Notable for the following components:    Sodium 134 (*)     Chloride 96 (*)     Glucose 120 (*)     Creatinine 2.7 (*)     Est, Glom Filt Rate 18 (*)     Total Protein 6.0 (*)     Alkaline Phosphatase 156 (*)     ALT 8 (*)     All other components within normal limits   BRAIN NATRIURETIC PEPTIDE - Abnormal; Notable for the following components:    Pro-BNP >70,000 (*)     All other components within normal limits   TROPONIN - Abnormal; Notable for the following components:    Troponin, High Sensitivity 168 (*)     All other components within normal limits   TROPONIN - Abnormal; Notable for the following components:    Troponin, High Sensitivity 160 (*)     All other components within normal limits     Critical values: no     Abnormal Assessment Findings: n/a    Background  History:   Past Medical History:   Diagnosis Date    Anemia     Atrial septal defect within oval fossa     CHB (complete heart block) ([facility])     Chronic diastolic HF (heart failure) ([facility])     Chronic left-sided low back pain     Chronic respiratory failure with hypoxia ([facility])     Diabetic foot infection ([facility])     DM2 (diabetes mellitus, type 2) ([facility])     ESRD (end stage renal disease) ([facility])     HLD (hyperlipidemia)     HTN (hypertension)     Hypothyroidism     MILE on CPAP     PAF (paroxysmal atrial fibrillation) ([address])     Polyneuropathy        Assessment    Vitals/MEWS:        Vitals:    10/28/23 0415 10/28/23 0430 10/28/23 0445 10/28/23

## 2023-10-28 NOTE — PLAN OF CARE
70 yrs F. ESRD. chronically ill admitted with sudden onset pleuritic upper back pain last night. Looks like she has single segmental pulmonary embolism. Also looks significantly hypervolemic. Starting heparin.  May benefit from getting additional HD session in the near future

## 2023-10-29 LAB
ANION GAP SERPL CALCULATED.3IONS-SCNC: 11 MMOL/L (ref 3–16)
ANTI-XA UNFRAC HEPARIN: 0.29 IU/ML (ref 0.3–0.7)
BASOPHILS # BLD: 0 K/UL (ref 0–0.2)
BASOPHILS NFR BLD: 0.8 %
BUN SERPL-MCNC: 26 MG/DL (ref 7–20)
CALCIUM SERPL-MCNC: 8.7 MG/DL (ref 8.3–10.6)
CHLORIDE SERPL-SCNC: 96 MMOL/L (ref 99–110)
CO2 SERPL-SCNC: 29 MMOL/L (ref 21–32)
CREAT SERPL-MCNC: 3.8 MG/DL (ref 0.6–1.2)
DEPRECATED RDW RBC AUTO: 20.6 % (ref 12.4–15.4)
EOSINOPHIL # BLD: 0.2 K/UL (ref 0–0.6)
EOSINOPHIL NFR BLD: 4.8 %
EST. AVERAGE GLUCOSE BLD GHB EST-MCNC: 79.6 MG/DL
GFR SERPLBLD CREATININE-BSD FMLA CKD-EPI: 12 ML/MIN/{1.73_M2}
GLUCOSE BLD-MCNC: 128 MG/DL (ref 70–99)
GLUCOSE BLD-MCNC: 128 MG/DL (ref 70–99)
GLUCOSE BLD-MCNC: 133 MG/DL (ref 70–99)
GLUCOSE BLD-MCNC: 172 MG/DL (ref 70–99)
GLUCOSE SERPL-MCNC: 138 MG/DL (ref 70–99)
HBA1C MFR BLD: 4.4 %
HCT VFR BLD AUTO: 27.8 % (ref 36–48)
HGB BLD-MCNC: 8.7 G/DL (ref 12–16)
LYMPHOCYTES # BLD: 0.9 K/UL (ref 1–5.1)
LYMPHOCYTES NFR BLD: 20.9 %
MCH RBC QN AUTO: 31 PG (ref 26–34)
MCHC RBC AUTO-ENTMCNC: 31.2 G/DL (ref 31–36)
MCV RBC AUTO: 99.5 FL (ref 80–100)
MONOCYTES # BLD: 0.4 K/UL (ref 0–1.3)
MONOCYTES NFR BLD: 10 %
NEUTROPHILS # BLD: 2.8 K/UL (ref 1.7–7.7)
NEUTROPHILS NFR BLD: 63.5 %
PERFORMED ON: ABNORMAL
PLATELET # BLD AUTO: 106 K/UL (ref 135–450)
PMV BLD AUTO: 7.9 FL (ref 5–10.5)
POTASSIUM SERPL-SCNC: 4.8 MMOL/L (ref 3.5–5.1)
RBC # BLD AUTO: 2.79 M/UL (ref 4–5.2)
SODIUM SERPL-SCNC: 136 MMOL/L (ref 136–145)
WBC # BLD AUTO: 4.4 K/UL (ref 4–11)

## 2023-10-29 PROCEDURE — 94640 AIRWAY INHALATION TREATMENT: CPT

## 2023-10-29 PROCEDURE — 2580000003 HC RX 258: Performed by: INTERNAL MEDICINE

## 2023-10-29 PROCEDURE — 97166 OT EVAL MOD COMPLEX 45 MIN: CPT

## 2023-10-29 PROCEDURE — 97535 SELF CARE MNGMENT TRAINING: CPT

## 2023-10-29 PROCEDURE — 6360000002 HC RX W HCPCS: Performed by: INTERNAL MEDICINE

## 2023-10-29 PROCEDURE — 1200000000 HC SEMI PRIVATE

## 2023-10-29 PROCEDURE — 97530 THERAPEUTIC ACTIVITIES: CPT

## 2023-10-29 PROCEDURE — 36415 COLL VENOUS BLD VENIPUNCTURE: CPT

## 2023-10-29 PROCEDURE — 97162 PT EVAL MOD COMPLEX 30 MIN: CPT

## 2023-10-29 PROCEDURE — 2700000000 HC OXYGEN THERAPY PER DAY

## 2023-10-29 PROCEDURE — 6360000002 HC RX W HCPCS: Performed by: STUDENT IN AN ORGANIZED HEALTH CARE EDUCATION/TRAINING PROGRAM

## 2023-10-29 PROCEDURE — 85520 HEPARIN ASSAY: CPT

## 2023-10-29 PROCEDURE — 6370000000 HC RX 637 (ALT 250 FOR IP): Performed by: INTERNAL MEDICINE

## 2023-10-29 PROCEDURE — 85025 COMPLETE CBC W/AUTO DIFF WBC: CPT

## 2023-10-29 PROCEDURE — 94761 N-INVAS EAR/PLS OXIMETRY MLT: CPT

## 2023-10-29 PROCEDURE — 80048 BASIC METABOLIC PNL TOTAL CA: CPT

## 2023-10-29 PROCEDURE — 6360000002 HC RX W HCPCS: Performed by: NURSE PRACTITIONER

## 2023-10-29 RX ORDER — MORPHINE SULFATE 4 MG/ML
4 INJECTION, SOLUTION INTRAMUSCULAR; INTRAVENOUS
Status: DISCONTINUED | OUTPATIENT
Start: 2023-10-29 | End: 2023-11-02 | Stop reason: HOSPADM

## 2023-10-29 RX ORDER — OXYCODONE HYDROCHLORIDE 5 MG/1
5 TABLET ORAL EVERY 4 HOURS PRN
Status: DISCONTINUED | OUTPATIENT
Start: 2023-10-29 | End: 2023-11-02 | Stop reason: HOSPADM

## 2023-10-29 RX ORDER — ACETAMINOPHEN 500 MG
1000 TABLET ORAL EVERY 8 HOURS SCHEDULED
Status: DISCONTINUED | OUTPATIENT
Start: 2023-10-29 | End: 2023-11-02 | Stop reason: HOSPADM

## 2023-10-29 RX ORDER — MORPHINE SULFATE 2 MG/ML
2 INJECTION, SOLUTION INTRAMUSCULAR; INTRAVENOUS
Status: DISCONTINUED | OUTPATIENT
Start: 2023-10-29 | End: 2023-11-02 | Stop reason: HOSPADM

## 2023-10-29 RX ORDER — HYDROCODONE BITARTRATE AND ACETAMINOPHEN 5; 325 MG/1; MG/1
1 TABLET ORAL EVERY 6 HOURS PRN
Status: DISCONTINUED | OUTPATIENT
Start: 2023-10-29 | End: 2023-11-02 | Stop reason: HOSPADM

## 2023-10-29 RX ADMIN — MORPHINE SULFATE 4 MG: 4 INJECTION, SOLUTION INTRAMUSCULAR; INTRAVENOUS at 20:54

## 2023-10-29 RX ADMIN — METHOCARBAMOL 500 MG: 500 TABLET ORAL at 14:09

## 2023-10-29 RX ADMIN — STANDARDIZED SENNA CONCENTRATE AND DOCUSATE SODIUM 1 TABLET: 8.6; 5 TABLET ORAL at 10:18

## 2023-10-29 RX ADMIN — Medication 10 ML: at 20:55

## 2023-10-29 RX ADMIN — ACETAMINOPHEN 650 MG: 325 TABLET ORAL at 06:35

## 2023-10-29 RX ADMIN — CETIRIZINE HYDROCHLORIDE 10 MG: 10 TABLET, FILM COATED ORAL at 10:23

## 2023-10-29 RX ADMIN — Medication 2 PUFF: at 12:27

## 2023-10-29 RX ADMIN — ALBUTEROL SULFATE 2.5 MG: 2.5 SOLUTION RESPIRATORY (INHALATION) at 12:27

## 2023-10-29 RX ADMIN — METOPROLOL TARTRATE 25 MG: 25 TABLET, FILM COATED ORAL at 20:58

## 2023-10-29 RX ADMIN — METOPROLOL TARTRATE 25 MG: 25 TABLET, FILM COATED ORAL at 10:19

## 2023-10-29 RX ADMIN — APIXABAN 10 MG: 5 TABLET, FILM COATED ORAL at 10:19

## 2023-10-29 RX ADMIN — ACETAMINOPHEN 1000 MG: 500 TABLET ORAL at 20:59

## 2023-10-29 RX ADMIN — Medication 2 PUFF: at 19:40

## 2023-10-29 RX ADMIN — MELATONIN TAB 3 MG 3 MG: 3 TAB at 21:00

## 2023-10-29 RX ADMIN — ACETAMINOPHEN 1000 MG: 500 TABLET ORAL at 14:11

## 2023-10-29 RX ADMIN — METHOCARBAMOL 500 MG: 500 TABLET ORAL at 10:19

## 2023-10-29 RX ADMIN — HEPARIN SODIUM 4920 UNITS: 1000 INJECTION INTRAVENOUS; SUBCUTANEOUS at 04:12

## 2023-10-29 RX ADMIN — OXYCODONE HYDROCHLORIDE 5 MG: 5 TABLET ORAL at 10:27

## 2023-10-29 RX ADMIN — LEVOTHYROXINE SODIUM 100 MCG: 0.1 TABLET ORAL at 10:18

## 2023-10-29 RX ADMIN — ASPIRIN 81 MG 81 MG: 81 TABLET ORAL at 10:18

## 2023-10-29 RX ADMIN — LINACLOTIDE 290 MCG: 145 CAPSULE, GELATIN COATED ORAL at 10:18

## 2023-10-29 RX ADMIN — FERROUS SULFATE TAB 325 MG (65 MG ELEMENTAL FE) 325 MG: 325 (65 FE) TAB at 14:09

## 2023-10-29 RX ADMIN — ALLOPURINOL 300 MG: 300 TABLET ORAL at 10:19

## 2023-10-29 RX ADMIN — Medication 10 ML: at 10:30

## 2023-10-29 RX ADMIN — ATORVASTATIN CALCIUM 40 MG: 40 TABLET, FILM COATED ORAL at 20:59

## 2023-10-29 RX ADMIN — HEPARIN SODIUM 17 UNITS/KG/HR: 10000 INJECTION, SOLUTION INTRAVENOUS at 02:15

## 2023-10-29 RX ADMIN — APIXABAN 10 MG: 5 TABLET, FILM COATED ORAL at 20:57

## 2023-10-29 RX ADMIN — POLYETHYLENE GLYCOL 3350 17 G: 17 POWDER, FOR SOLUTION ORAL at 10:22

## 2023-10-29 RX ADMIN — ALBUTEROL SULFATE 2.5 MG: 2.5 SOLUTION RESPIRATORY (INHALATION) at 19:40

## 2023-10-29 RX ADMIN — ALBUTEROL SULFATE 2.5 MG: 2.5 SOLUTION RESPIRATORY (INHALATION) at 16:23

## 2023-10-29 RX ADMIN — MELOXICAM 15 MG: 7.5 TABLET ORAL at 10:18

## 2023-10-29 RX ADMIN — ALPRAZOLAM 0.25 MG: 0.25 TABLET ORAL at 06:35

## 2023-10-29 ASSESSMENT — PAIN DESCRIPTION - DESCRIPTORS: DESCRIPTORS: GNAWING

## 2023-10-29 ASSESSMENT — PAIN SCALES - GENERAL
PAINLEVEL_OUTOF10: 7
PAINLEVEL_OUTOF10: 8

## 2023-10-29 ASSESSMENT — PAIN DESCRIPTION - LOCATION: LOCATION: BACK

## 2023-10-29 ASSESSMENT — PAIN DESCRIPTION - ORIENTATION: ORIENTATION: MID;LOWER

## 2023-10-29 NOTE — PROGRESS NOTES
10/28/23 0719   RT Protocol   History Pulmonary Disease 2   Respiratory pattern 2   Breath sounds 6   Cough 0   Indications for Bronchodilator Therapy Decreased or absent breath sounds; On home bronchodilators   Bronchodilator Assessment Score 10

## 2023-10-29 NOTE — PROGRESS NOTES
MD Francisca Winters MD Yale Rose, MD                                  Office: [phone]                 Fax: [phone]          [employer]                     NEPHROLOGY NOTE:     PATIENT NAME: Hoa Butler  : 1955  MRN: [id number]      Name:  Hoa Butler Date/Time of Admission: 10/27/2023 11:31 PM    CSN: [id number] Attending Provider: Jessie Callahan MD   Room/Bed: [address] [place], [place] [id number]- : 1955 Age: 70 y.o. Patient resting comfortably. Has been started on heparin and Eliquis for pulmonary embolism. No worsening of respiratory distress. Last hemodialysis treatment on Friday. Borderline hypotension noted. Blood pressure medications have been discontinued. Continue Lopressor 25 mg once daily. Electrolytes are stable. - Creatinine stable 3.8.  - Hemoglobin stable 8.7. Fluid overload stable. Plan for repeat hemodialysis treatment tomorrow if hypotension allows may plan extra ultrafiltration    Medications reviewed. All nephrotoxic medications have been discontinued. Hold Ace inhibitors till renal recovery is complete. High complexity. Reason for Nephrology consult. Evaluation of patient with ESRD  dependent on dialysis, admitted with generalized weakness and worsening shortness of breath. History of Presenting complaint. Caren Jacques 70 y.o. of age,  And is known to have ESRD dependent on dialysis. outpt HD center: [facility]. [facility] [address] [facility] nephrology  - Access: [facility], follow-up outpatient with her nephrologist for further access plans    -Currently at [facility] care. Received hemodialysis treatment yesterday. Admitted with shortness of breath. CTA of the chest with contrast--small pulmonary arterial embolism  Mild fluid overload.   small pleural effusion. .  Dilated cardiomyopathy    Sodium is 134 and a potassium of 4.5    Blood pressure control is injection 2 mg, 2 mg, IntraVENous, Q2H PRN **OR** morphine injection 4 mg, 4 mg, IntraVENous, Q2H PRN  acetaminophen (TYLENOL) tablet 1,000 mg, 1,000 mg, Oral, 3 times per day  oxyCODONE (ROXICODONE) immediate release tablet 5 mg, 5 mg, Oral, Q4H PRN  lidocaine 4 % external patch 1 patch, 1 patch, TransDERmal, Daily  allopurinol (ZYLOPRIM) tablet 300 mg, 300 mg, Oral, Daily  ALPRAZolam (XANAX) tablet 0.25 mg, 0.25 mg, Oral, TID PRN  aspirin chewable tablet 81 mg, 81 mg, Oral, Daily  atorvastatin (LIPITOR) tablet 40 mg, 40 mg, Oral, Nightly  cetirizine (ZYRTEC) tablet 10 mg, 10 mg, Oral, Daily  ferrous sulfate (IRON 325) tablet 325 mg, 325 mg, Oral, Lunch  hydrocortisone (ANUSOL-HC) suppository 25 mg, 25 mg, Rectal, Daily PRN  hydrOXYzine HCl (ATARAX) tablet 25 mg, 25 mg, Oral, TID PRN  levothyroxine (SYNTHROID) tablet 100 mcg, 100 mcg, Oral, QAM  linaclotide (LINZESS) capsule 290 mcg, 290 mcg, Oral, Daily  melatonin tablet 3 mg, 3 mg, Oral, Nightly PRN  meloxicam (MOBIC) tablet 15 mg, 15 mg, Oral, Daily  methocarbamol (ROBAXIN) tablet 500 mg, 500 mg, Oral, BID  metoprolol tartrate (LOPRESSOR) tablet 25 mg, 25 mg, Oral, BID  polyethylene glycol (GLYCOLAX) packet 17 g, 17 g, Oral, Daily  sennosides-docusate sodium (SENOKOT-S) 8.6-50 MG tablet 1 tablet, 1 tablet, Oral, Daily  sodium chloride flush 0.9 % injection 5-40 mL, 5-40 mL, IntraVENous, 2 times per day  sodium chloride flush 0.9 % injection 5-40 mL, 5-40 mL, IntraVENous, PRN  0.9 % sodium chloride infusion, , IntraVENous, PRN  ondansetron (ZOFRAN-ODT) disintegrating tablet 4 mg, 4 mg, Oral, Q8H PRN **OR** ondansetron (ZOFRAN) injection 4 mg, 4 mg, IntraVENous, Q6H PRN  polyethylene glycol (GLYCOLAX) packet 17 g, 17 g, Oral, Daily PRN  apixaban (ELIQUIS) tablet 10 mg, 10 mg, Oral, BID **FOLLOWED BY** [START ON 11/5/2023] apixaban (ELIQUIS) tablet 5 mg, 5 mg, Oral, BID  perflutren lipid microspheres (DEFINITY) injection 1.5 mL, 1.5 mL, IntraVENous, ONCE

## 2023-10-29 NOTE — PROGRESS NOTES
HARRY Sams notfied Nurse was told \"Start at same rate, no bolus. It might need adjustment with next lab draw\". IV placed. Heparin drip started @17unit/kg/h. INR <0.10.

## 2023-10-29 NOTE — PROGRESS NOTES
[address] [facility] Department   Phone: [phone]    Physical Therapy    [x] Initial Evaluation            [] Daily Treatment Note         [] Discharge Summary      Patient: Liudmila Richter   : 1955   MRN: [id number]   Date of Service:  10/29/2023  Admitting Diagnosis: Pulmonary embolus, left [facility])  Current Admission Summary: Liudmila Richter is a 70 y.o. female who presents to the emergency department with complaint of pain to the right scapula that she rates as 8 of 10. Sudden onset of symptoms while sitting on the bedside commode and trying to bear down for a bowel movement. The patient denies any chest pain or shortness of breath. States that staff at the nursing home did apply a salve as well as give her her nightly pain medication without relief of symptoms. Reports that the pain feels \"muscular\" in nature and that she is unable to get comfortable. She does appear very uncomfortable, unable to sit back. She did have a full session of dialysis today without complication. Denies any headache, fever, lightheadedness, dizziness, visual disturbances. No chest pain or pressure. No neck pain. No shortness of breath, cough, or congestion. No abdominal pain, nausea, vomiting, diarrhea, constipation, or dysuria. No rash. *Pt found to have PE    *Pt has hx of L BKA in 2023. Past Medical History:  has a past medical history of Anemia, Atrial septal defect within oval fossa, CHB (complete heart block) (HCC), Chronic diastolic HF (heart failure) (HCC), Chronic left-sided low back pain, Chronic respiratory failure with hypoxia ([address]), Diabetic foot infection ([address]), DM2 (diabetes mellitus, type 2) ([address]), ESRD (end stage renal disease) ([address]), HLD (hyperlipidemia), HTN (hypertension), Hypothyroidism, MILE on CPAP, PAF (paroxysmal atrial fibrillation) ([address]), and Polyneuropathy. Past Surgical History:  has a past surgical history that includes Pacemaker insertion.   Discharge to maintain balance  Dynamic Standing Balance: poor (-): requires max (A) to maintain balance  Comments:    Other Therapeutic Interventions  Pt assisted with oral care and upper and lower body dressing and bathing. See OT note for assist levels. Second Session:  Returned to patient room in PM after call from RN staff that patient was unable to use STEDY with nursing staff. Suggested use of Maxi Move Lift however nurse asking therapy for assistance. Pt seated in recliner on arrival. Pt transferred chair to bed via the maxi move. Pt was positioned in semi-fowlers in bed with her bed alarm on and call light within reach. Functional Outcomes  AM-PAC Inpatient Mobility Raw Score : 12              Cognition  WFL  Orientation:    alert and oriented x 4  Command Following:   [facility]    Education  Barriers To Learning: none  Patient Education: patient educated on goals, PT role and benefits, plan of care, general safety, functional mobility training, disease specific education, transfer training, discharge recommendations  Learning Assessment:  patient verbalizes understanding, would benefit from continued reinforcement    Assessment  Activity Tolerance: Pt is limited by generalized weakness. Impairments Requiring Therapeutic Intervention: decreased functional mobility, decreased strength, decreased endurance, decreased balance  Prognosis: fair  Clinical Assessment: Pt is a 69 y/o female who presents to the hospital with a PE. Pt is presenting below her baseline level of function and required up to max A for performance of bed mobility and transfers. Pt will benefit from continued skilled PT to facilitate return to PLOF and to promote independence.   Safety Interventions: patient left in chair, chair alarm in place, call light within reach, gait belt, and nurse notified    Plan  Frequency: 3-5 x/per week  Current Treatment Recommendations: strengthening, ROM, balance training, functional mobility training, transfer

## 2023-10-29 NOTE — PROGRESS NOTES
[address] [place] [address] Department   Phone: [phone]    Occupational Therapy    [x] Initial Evaluation            [] Daily Treatment Note         [] Discharge Summary      Patient: Selma Soulier   : 1955   MRN: [id number]   Date of Service:  10/29/2023    Admitting Diagnosis:  Pulmonary embolus, left [facility])  Current Admission Summary:    Pt via EMS from [facility], c/o right scapula pain 8/10 sudden onset at 10 pm, denies injury, has pacemaker and cardiac hx, denies cp and sob, baseline 3 L oxygen    Diagnosed with PE and B pleural effusions  Heparin therapy initiated 6AM 10/28   History BK - Feb of this year   Past Medical History:  has a past medical history of Anemia, Atrial septal defect within oval fossa, CHB (complete heart block) (HCC), Chronic diastolic HF (heart failure) (HCC), Chronic left-sided low back pain, Chronic respiratory failure with hypoxia ([address]), Diabetic foot infection ([address]), DM2 (diabetes mellitus, type 2) ([address]), ESRD (end stage renal disease) ([address]), HLD (hyperlipidemia), HTN (hypertension), Hypothyroidism, MILE on CPAP, PAF (paroxysmal atrial fibrillation) ([address]), and Polyneuropathy. Past Surgical History:  has a past surgical history that includes Pacemaker insertion. Discharge Recommendations: Selma Soulier scored a 16/24 on the AM-PAC ADL Inpatient form. Current research shows that an AM-PAC score of 17 or less is typically not associated with a discharge to the patient's home setting. Based on the patient's AM-PAC score and their current ADL deficits, it is recommended that the patient have 3-5 sessions per week of Occupational Therapy at d/c to increase the patient's independence. Please see assessment section for further patient specific details. If patient discharges prior to next session this note will serve as a discharge summary. Please see below for the latest assessment towards goals.       DME Required For

## 2023-10-30 LAB
GLUCOSE BLD-MCNC: 111 MG/DL (ref 70–99)
GLUCOSE BLD-MCNC: 125 MG/DL (ref 70–99)
GLUCOSE BLD-MCNC: 149 MG/DL (ref 70–99)
GLUCOSE BLD-MCNC: 161 MG/DL (ref 70–99)
PERFORMED ON: ABNORMAL

## 2023-10-30 PROCEDURE — 90935 HEMODIALYSIS ONE EVALUATION: CPT

## 2023-10-30 PROCEDURE — 6370000000 HC RX 637 (ALT 250 FOR IP): Performed by: INTERNAL MEDICINE

## 2023-10-30 PROCEDURE — 5A1D70Z PERFORMANCE OF URINARY FILTRATION, INTERMITTENT, LESS THAN 6 HOURS PER DAY: ICD-10-PCS | Performed by: INTERNAL MEDICINE

## 2023-10-30 PROCEDURE — 1200000000 HC SEMI PRIVATE

## 2023-10-30 PROCEDURE — 94640 AIRWAY INHALATION TREATMENT: CPT

## 2023-10-30 PROCEDURE — 6360000002 HC RX W HCPCS: Performed by: INTERNAL MEDICINE

## 2023-10-30 PROCEDURE — 2580000003 HC RX 258: Performed by: INTERNAL MEDICINE

## 2023-10-30 PROCEDURE — 6360000002 HC RX W HCPCS: Performed by: NURSE PRACTITIONER

## 2023-10-30 PROCEDURE — 2700000000 HC OXYGEN THERAPY PER DAY

## 2023-10-30 PROCEDURE — 94761 N-INVAS EAR/PLS OXIMETRY MLT: CPT

## 2023-10-30 RX ADMIN — APIXABAN 10 MG: 5 TABLET, FILM COATED ORAL at 21:23

## 2023-10-30 RX ADMIN — FERROUS SULFATE TAB 325 MG (65 MG ELEMENTAL FE) 325 MG: 325 (65 FE) TAB at 13:13

## 2023-10-30 RX ADMIN — OXYCODONE HYDROCHLORIDE 5 MG: 5 TABLET ORAL at 21:29

## 2023-10-30 RX ADMIN — ACETAMINOPHEN 1000 MG: 500 TABLET ORAL at 21:24

## 2023-10-30 RX ADMIN — ACETAMINOPHEN 1000 MG: 500 TABLET ORAL at 13:12

## 2023-10-30 RX ADMIN — LINACLOTIDE 290 MCG: 145 CAPSULE, GELATIN COATED ORAL at 13:15

## 2023-10-30 RX ADMIN — CETIRIZINE HYDROCHLORIDE 10 MG: 10 TABLET, FILM COATED ORAL at 13:13

## 2023-10-30 RX ADMIN — ACETAMINOPHEN 1000 MG: 500 TABLET ORAL at 04:53

## 2023-10-30 RX ADMIN — METOPROLOL TARTRATE 25 MG: 25 TABLET, FILM COATED ORAL at 13:15

## 2023-10-30 RX ADMIN — ASPIRIN 81 MG 81 MG: 81 TABLET ORAL at 13:15

## 2023-10-30 RX ADMIN — MELOXICAM 15 MG: 7.5 TABLET ORAL at 13:14

## 2023-10-30 RX ADMIN — Medication 10 ML: at 07:59

## 2023-10-30 RX ADMIN — Medication 2 PUFF: at 21:16

## 2023-10-30 RX ADMIN — MORPHINE SULFATE 4 MG: 4 INJECTION, SOLUTION INTRAMUSCULAR; INTRAVENOUS at 07:58

## 2023-10-30 RX ADMIN — METOPROLOL TARTRATE 25 MG: 25 TABLET, FILM COATED ORAL at 21:24

## 2023-10-30 RX ADMIN — ALBUTEROL SULFATE 2.5 MG: 2.5 SOLUTION RESPIRATORY (INHALATION) at 15:08

## 2023-10-30 RX ADMIN — ALPRAZOLAM 0.25 MG: 0.25 TABLET ORAL at 21:29

## 2023-10-30 RX ADMIN — APIXABAN 10 MG: 5 TABLET, FILM COATED ORAL at 13:14

## 2023-10-30 RX ADMIN — METHOCARBAMOL 500 MG: 500 TABLET ORAL at 13:13

## 2023-10-30 RX ADMIN — STANDARDIZED SENNA CONCENTRATE AND DOCUSATE SODIUM 1 TABLET: 8.6; 5 TABLET ORAL at 13:15

## 2023-10-30 RX ADMIN — OXYCODONE HYDROCHLORIDE 5 MG: 5 TABLET ORAL at 13:15

## 2023-10-30 RX ADMIN — ALBUTEROL SULFATE 2.5 MG: 2.5 SOLUTION RESPIRATORY (INHALATION) at 21:16

## 2023-10-30 RX ADMIN — ATORVASTATIN CALCIUM 40 MG: 40 TABLET, FILM COATED ORAL at 21:23

## 2023-10-30 RX ADMIN — ALLOPURINOL 300 MG: 300 TABLET ORAL at 13:14

## 2023-10-30 RX ADMIN — Medication 10 ML: at 21:21

## 2023-10-30 RX ADMIN — LEVOTHYROXINE SODIUM 100 MCG: 0.1 TABLET ORAL at 13:15

## 2023-10-30 RX ADMIN — POLYETHYLENE GLYCOL 3350 17 G: 17 POWDER, FOR SOLUTION ORAL at 13:16

## 2023-10-30 ASSESSMENT — PAIN DESCRIPTION - LOCATION
LOCATION: BACK

## 2023-10-30 ASSESSMENT — PAIN DESCRIPTION - DESCRIPTORS
DESCRIPTORS: ACHING
DESCRIPTORS: ACHING;SORE
DESCRIPTORS: DISCOMFORT
DESCRIPTORS: DISCOMFORT;PRESSURE

## 2023-10-30 ASSESSMENT — PAIN SCALES - GENERAL
PAINLEVEL_OUTOF10: 8
PAINLEVEL_OUTOF10: 0
PAINLEVEL_OUTOF10: 5
PAINLEVEL_OUTOF10: 0
PAINLEVEL_OUTOF10: 5
PAINLEVEL_OUTOF10: 8

## 2023-10-30 ASSESSMENT — PAIN DESCRIPTION - ORIENTATION
ORIENTATION: LOWER

## 2023-10-30 ASSESSMENT — PAIN SCALES - WONG BAKER
WONGBAKER_NUMERICALRESPONSE: 0

## 2023-10-30 ASSESSMENT — PAIN DESCRIPTION - PAIN TYPE: TYPE: CHRONIC PAIN

## 2023-10-30 ASSESSMENT — PAIN - FUNCTIONAL ASSESSMENT: PAIN_FUNCTIONAL_ASSESSMENT: PREVENTS OR INTERFERES SOME ACTIVE ACTIVITIES AND ADLS

## 2023-10-30 NOTE — CARE COORDINATION
[place] [address]  Phone: [phone]  Fax: [phone]      Call to [facility] staff, Stevie Chicas, at [phone] who confirmed the patient is: [address], no Precert required for return. Patient Covid vaccination status:    Internal Administration   First Dose COVID-19, PFIZER PURPLE top, DILUTE for use, (age 15 y+), 30mcg/0.3mL  03/22/2021   Second Dose COVID-19, PFIZER PURPLE top, DILUTE for use, (age 15 y+), 30mcg/0.3mL   04/12/2021       Last COVID Lab POC Glucose (mg/dl)   Date Value   10/30/2023 111 (H)            Covid Test requirement for return: Other requirements:N/A      Additional Case Management Notes:  (IVETT) spoke with [facility] admissions staff, Stevie Chicas, and informed of patient's recommendation for  skilled therapy. Stevie Chicas agreed to start skilled nursing facility (SNF) pre-cert for patient today. CM spoke with patient who agreed to returning to [facility] as SNF and reported that she loves their therapy and thinks they're great. Patient reported that she doesn't want to stay there as LTC as she wants to be closer to her family and reported that her Sister drives over an hour to visit her from [place], [place]. Patient reported that she has informed the  of [facility] of this desire and that they have not been helpful in assisting. CM provided patient with [facility] information and encouraged her to call the, to change LTC placement. Patient accepted information, agreed to call and to returning to [facility] as SNF.     The Plan for Transition of Care is related to the following treatment goals of Pulmonary embolus, left ([address]) [I26.99]  Upper back pain [M54.9]  Hypervolemia associated with renal insufficiency [E87.70, N28.9]  Acute pulmonary embolism without acute cor pulmonale, unspecified pulmonary embolism type ([address]) [I26.99]    The Patient and/or Patient Representative Agree with the Discharge Plan?       GABY Guido  Case Management Department  Ph: [phone] Fax: [phone]

## 2023-10-30 NOTE — PROGRESS NOTES
Treatment time: 3.5 hours    Net UF: 3800     Pre weight: 117.8  Post weight: 114.0  EDW: 114 kg    Access used: R Upper Chest Wall [facility]  Access function:     Medications or blood products given: none    Regular outpatient schedule: MWF    Summary of response to treatment: Pt. Tolerates treatment well today    Copy of dialysis treatment record placed in chart, to be scanned into EMR.

## 2023-10-30 NOTE — PROGRESS NOTES
Physical/Occupational Therapy  Ignacio Kidd    Attempted to see pt for PT/OT treatment. Patient declined therapy stating fatigue following dialysis and requesting to rest. Will hold therapy at this time and will follow up with pt as schedule permits. Thanks, Melvin Merchant, PT, DPT [id number], Luz Haji OTR/L  [id number].

## 2023-10-30 NOTE — CARE COORDINATION
Discharge Planning:     (CM) attempted to complete Readmission Assessment and Initial Assessment with patient, as well as discuss therapy recommendation for a skilled nursing facility (SNF) placement, but was unable to due to patient being off the floor in dialysis.       Melvin GRIFFIN, MAXINE, [facility] -   [phone]    Electronically signed by GABY Hemphill on 10/30/2023 at 11:18 AM

## 2023-10-30 NOTE — PROGRESS NOTES
VS obtained. Patient c/o pain, 8/10, in lower back, describes it as a \"discomfort;\" \"pressure. \" PRN morphine administered. Patient refused scheduled lidocaine patch. Call light within reach. Fall precautions in place. Bed locked and in low position, bed alarm engaged. No further needs expressed.

## 2023-10-30 NOTE — PROGRESS NOTES
Hospitalist Progress Note      PCP: Devaughn Shin MD    Date of Admission: 10/27/2023    LOS: 2    Chief Complaint:   Chief Complaint   Patient presents with    Shoulder Pain     Pt via EMS from [facility], c/o right scapula pain 8/10 sudden onset at 10 pm, denies injury, has pacemaker and cardiac hx, denies cp and sob, baseline 3 L oxygen       Case Summary:   27-year-old lady with history of end-stage renal disease on hemodialysis, hypertension, hyperlipidemia, chronic diastolic heart failure, type 2 diabetes with neuropathy, paroxysmal atrial fibrillation, history of left BKA who presented with left upper back pleuritic chest pain with associated shortness of breath found to have left upper lobe pulmonary embolus and volume overload with peripheral edema. Active Hospital Problems    Diagnosis Date Noted    Pulmonary embolus, left ([address]) [I26.99] 10/28/2023    Volume overload [E87.70] 10/28/2023    ESRD on hemodialysis ([address]) [N18.6, Z99.2] 10/28/2023    Type 2 diabetes mellitus ([address]) [E11.9] 10/28/2023    HTN (hypertension) [I10] 10/28/2023         Principal Problem:    Pulmonary embolus, left [facility]): Improved pain though still present. Controlled with analgesia. No shortness of breath  - Tolerating Eliquis  - Continue pain management      Pericardial effusion: Noted on CTPA on admission. Effusion noted on a recent echo done 4 weeks ago. Will get echocardiogram to assess interval change      Volume overload: Status post dialysis on Friday with plans for repeat dialysis tomorrow. May need ultrafiltration if BP allows. BP medications discontinued due to hypotension.   Nephrology following with recommendation and fluid shifts with dialysis    Active Problems:    ESRD on hemodialysis [facility]): Dialysis as per nephrology    Type 2 diabetes mellitus [facility]): Sliding scale insulin for glycemic control    HTN (hypertension): Seen nephrology and antihypertensives discontinued to allow for ultrafiltration

## 2023-10-30 NOTE — CARE COORDINATION
10/30/23 1532   Readmission Assessment   Number of Days since last admission? 8-30 days   Previous Disposition Long Term Care  (Completed with Long-Term Care admissions staff, Awilda Delarosa ([phone]). )   Who is being Interviewed Caregiver   What was the patient's/caregiver's perception as to why they think they needed to return back to the hospital? Other (Comment)  (Previous admission was for lower back pain and this admission is for Pulmonary Embolus, which are two seperate admission concerns.)   Did you visit your Primary Care Physician after you left the hospital, before you returned this time? Yes  (LTC Physician.)   Did you see a specialist, such as Cardiac, Pulmonary, Orthopedic Physician, etc. after you left the hospital? Other (Comment)  (Unknown)   Who advised the patient to return to the hospital? Other (Comment)  (LTC staff)   Does the patient report anything that got in the way of taking their medications? No   In our efforts to provide the best possible care to you and others like you, can you think of anything that we could have done to help you after you left the hospital the first time, so that you might not have needed to return so soon?  Other (Comment)  (Admissions staff unsure.)     Electronically signed by GABY Roberts on 10/30/2023 at 3:36 PM

## 2023-10-31 ENCOUNTER — APPOINTMENT (OUTPATIENT)
Dept: GENERAL RADIOLOGY | Age: 68
DRG: 175 | End: 2023-10-31
Payer: MEDICARE

## 2023-10-31 LAB
GLUCOSE BLD-MCNC: 105 MG/DL (ref 70–99)
GLUCOSE BLD-MCNC: 108 MG/DL (ref 70–99)
GLUCOSE BLD-MCNC: 131 MG/DL (ref 70–99)
GLUCOSE BLD-MCNC: 142 MG/DL (ref 70–99)
PERFORMED ON: ABNORMAL

## 2023-10-31 PROCEDURE — 2700000000 HC OXYGEN THERAPY PER DAY

## 2023-10-31 PROCEDURE — 93325 DOPPLER ECHO COLOR FLOW MAPG: CPT

## 2023-10-31 PROCEDURE — 94761 N-INVAS EAR/PLS OXIMETRY MLT: CPT

## 2023-10-31 PROCEDURE — 93321 DOPPLER ECHO F-UP/LMTD STD: CPT

## 2023-10-31 PROCEDURE — 97530 THERAPEUTIC ACTIVITIES: CPT

## 2023-10-31 PROCEDURE — 6360000002 HC RX W HCPCS: Performed by: NURSE PRACTITIONER

## 2023-10-31 PROCEDURE — 6370000000 HC RX 637 (ALT 250 FOR IP): Performed by: INTERNAL MEDICINE

## 2023-10-31 PROCEDURE — 94640 AIRWAY INHALATION TREATMENT: CPT

## 2023-10-31 PROCEDURE — 97535 SELF CARE MNGMENT TRAINING: CPT

## 2023-10-31 PROCEDURE — 71046 X-RAY EXAM CHEST 2 VIEWS: CPT

## 2023-10-31 PROCEDURE — 93308 TTE F-UP OR LMTD: CPT

## 2023-10-31 PROCEDURE — 6370000000 HC RX 637 (ALT 250 FOR IP): Performed by: STUDENT IN AN ORGANIZED HEALTH CARE EDUCATION/TRAINING PROGRAM

## 2023-10-31 PROCEDURE — 2580000003 HC RX 258: Performed by: INTERNAL MEDICINE

## 2023-10-31 PROCEDURE — 1200000000 HC SEMI PRIVATE

## 2023-10-31 RX ORDER — CALCIUM CARBONATE 500 MG/1
500 TABLET, CHEWABLE ORAL 3 TIMES DAILY PRN
Status: DISCONTINUED | OUTPATIENT
Start: 2023-10-31 | End: 2023-11-02 | Stop reason: HOSPADM

## 2023-10-31 RX ORDER — PANTOPRAZOLE SODIUM 40 MG/1
40 TABLET, DELAYED RELEASE ORAL
Status: DISCONTINUED | OUTPATIENT
Start: 2023-11-01 | End: 2023-11-02 | Stop reason: HOSPADM

## 2023-10-31 RX ADMIN — APIXABAN 10 MG: 5 TABLET, FILM COATED ORAL at 20:20

## 2023-10-31 RX ADMIN — ALBUTEROL SULFATE 2.5 MG: 2.5 SOLUTION RESPIRATORY (INHALATION) at 20:47

## 2023-10-31 RX ADMIN — LINACLOTIDE 290 MCG: 145 CAPSULE, GELATIN COATED ORAL at 10:25

## 2023-10-31 RX ADMIN — ANTACID TABLETS 500 MG: 500 TABLET, CHEWABLE ORAL at 10:38

## 2023-10-31 RX ADMIN — ALPRAZOLAM 0.25 MG: 0.25 TABLET ORAL at 17:23

## 2023-10-31 RX ADMIN — OXYCODONE HYDROCHLORIDE 5 MG: 5 TABLET ORAL at 17:23

## 2023-10-31 RX ADMIN — HYDROCODONE BITARTRATE AND ACETAMINOPHEN 1 TABLET: 5; 325 TABLET ORAL at 10:24

## 2023-10-31 RX ADMIN — OXYCODONE HYDROCHLORIDE 5 MG: 5 TABLET ORAL at 23:45

## 2023-10-31 RX ADMIN — ACETAMINOPHEN 1000 MG: 500 TABLET ORAL at 20:20

## 2023-10-31 RX ADMIN — STANDARDIZED SENNA CONCENTRATE AND DOCUSATE SODIUM 1 TABLET: 8.6; 5 TABLET ORAL at 10:25

## 2023-10-31 RX ADMIN — LEVOTHYROXINE SODIUM 100 MCG: 0.1 TABLET ORAL at 10:25

## 2023-10-31 RX ADMIN — ALBUTEROL SULFATE 2.5 MG: 2.5 SOLUTION RESPIRATORY (INHALATION) at 15:02

## 2023-10-31 RX ADMIN — ATORVASTATIN CALCIUM 40 MG: 40 TABLET, FILM COATED ORAL at 20:19

## 2023-10-31 RX ADMIN — FERROUS SULFATE TAB 325 MG (65 MG ELEMENTAL FE) 325 MG: 325 (65 FE) TAB at 10:24

## 2023-10-31 RX ADMIN — APIXABAN 10 MG: 5 TABLET, FILM COATED ORAL at 10:23

## 2023-10-31 RX ADMIN — ACETAMINOPHEN 1000 MG: 500 TABLET ORAL at 15:34

## 2023-10-31 RX ADMIN — METHOCARBAMOL 500 MG: 500 TABLET ORAL at 10:24

## 2023-10-31 RX ADMIN — METHOCARBAMOL 500 MG: 500 TABLET ORAL at 17:23

## 2023-10-31 RX ADMIN — Medication 10 ML: at 10:27

## 2023-10-31 RX ADMIN — Medication 2 PUFF: at 20:49

## 2023-10-31 RX ADMIN — CETIRIZINE HYDROCHLORIDE 10 MG: 10 TABLET, FILM COATED ORAL at 10:24

## 2023-10-31 RX ADMIN — MELOXICAM 15 MG: 7.5 TABLET ORAL at 10:23

## 2023-10-31 RX ADMIN — Medication 2 PUFF: at 09:47

## 2023-10-31 RX ADMIN — ALLOPURINOL 300 MG: 300 TABLET ORAL at 10:23

## 2023-10-31 RX ADMIN — ACETAMINOPHEN 1000 MG: 500 TABLET ORAL at 05:16

## 2023-10-31 RX ADMIN — Medication 10 ML: at 20:21

## 2023-10-31 RX ADMIN — ASPIRIN 81 MG 81 MG: 81 TABLET ORAL at 10:23

## 2023-10-31 RX ADMIN — METOPROLOL TARTRATE 25 MG: 25 TABLET, FILM COATED ORAL at 10:24

## 2023-10-31 RX ADMIN — ALPRAZOLAM 0.25 MG: 0.25 TABLET ORAL at 10:23

## 2023-10-31 RX ADMIN — POLYETHYLENE GLYCOL 3350 17 G: 17 POWDER, FOR SOLUTION ORAL at 10:38

## 2023-10-31 RX ADMIN — ALBUTEROL SULFATE 2.5 MG: 2.5 SOLUTION RESPIRATORY (INHALATION) at 09:46

## 2023-10-31 RX ADMIN — METOPROLOL TARTRATE 25 MG: 25 TABLET, FILM COATED ORAL at 20:19

## 2023-10-31 ASSESSMENT — PAIN SCALES - WONG BAKER
WONGBAKER_NUMERICALRESPONSE: 0

## 2023-10-31 ASSESSMENT — PAIN DESCRIPTION - ORIENTATION
ORIENTATION: MID
ORIENTATION: MID;LOWER

## 2023-10-31 ASSESSMENT — PAIN DESCRIPTION - DESCRIPTORS
DESCRIPTORS: ACHING;PRESSURE
DESCRIPTORS: ACHING;PRESSURE

## 2023-10-31 ASSESSMENT — PAIN DESCRIPTION - LOCATION
LOCATION: BACK

## 2023-10-31 ASSESSMENT — PAIN SCALES - GENERAL
PAINLEVEL_OUTOF10: 8
PAINLEVEL_OUTOF10: 9
PAINLEVEL_OUTOF10: 7

## 2023-10-31 NOTE — PROGRESS NOTES
[address] [place] [address] Department   Phone: [phone]    Occupational Therapy    [] Initial Evaluation            [x] Daily Treatment Note         [] Discharge Summary      Patient: Deysi Bucio   : 1955   MRN: [id number]   Date of Service:  10/31/2023    Admitting Diagnosis:  Pulmonary embolus, left [facility])  Current Admission Summary:    [facility], c/o right scapula pain 8/10 sudden onset at 10 pm, denies injury, has pacemaker and cardiac hx, denies cp and sob, baseline 3 L oxygen    Diagnosed with PE and B pleural effusions  Heparin therapy initiated 6AM 10/28   History BKA - Feb of this year   Past Medical History:  has a past medical history of Anemia, Atrial septal defect within oval fossa, CHB (complete heart block) (HCC), Chronic diastolic HF (heart failure) ([facility]), Chronic left-sided low back pain, Chronic respiratory failure with hypoxia ([address]), Diabetic foot infection ([address]), DM2 (diabetes mellitus, type 2) ([address]), ESRD (end stage renal disease) ([address]), HLD (hyperlipidemia), HTN (hypertension), Hypothyroidism, MILE on CPAP, PAF (paroxysmal atrial fibrillation) ([address]), and Polyneuropathy. Past Surgical History:  has a past surgical history that includes Pacemaker insertion. Discharge Recommendations: Deysi Bucio scored a 15/24 on the AM-PAC ADL Inpatient form. Current research shows that an AM-PAC score of 17 or less is typically not associated with a discharge to the patient's home setting. Based on the patient's AM-PAC score and their current ADL deficits, it is recommended that the patient have 3-5 sessions per week of Occupational Therapy at d/c to increase the patient's independence. Please see assessment section for further patient specific details. If patient discharges prior to next session this note will serve as a discharge summary. Please see below for the latest assessment towards goals.       DME Required For Dressing: stand by assistance gown only   Toileting: dependent. Toileting Comments: purewick in place   Instrumental Activities of Daily Living  No IADL completed on this date. Functional Mobility  Bed Mobility:  Supine to Sit: stand by assistance  Scooting: stand by assistance  Comments: HOB elevated and use of bed rail, increased time and effort to perform. Transfers:  Sit to stand transfer:2 person assistance with min A x2 up to RW   Stand to sit transfer: 2 person assistance with min A x2   Bed / Chair transfer: contact guard assistance. Bed / Chair equipment: no device  Bed / Chair comments: pt performed lateral/scoot transfer EOB > drop arm recliner, good awareness of weight shifting techniques to sequence transfer, increased time and effort. Comments: pt attempted x2 sit <> stand transfers up to RW with pt's preference of bed height, some clearance on first attempt, pt unable to come fully upright, minimal clearance on second attempt. Functional Mobility  No functional mobility completed on this date secondary to unable .   Balance:  Static Sitting Balance: good: independent with functional balance in unsupported position  Dynamic Sitting Balance: good: independent with functional balance in unsupported position  Static Standing Balance: poor (-): requires max (A) to maintain balance  Dynamic Standing Balance: poor (-): requires max (A) to maintain balance  Comments:    Other Therapeutic Interventions    Functional Outcomes  AM-PAC Inpatient Daily Activity Raw Score: 15    Cognition  WFL  Orientation:    alert and oriented x 4  Command Following:   [facility]     Education  Barriers To Learning: physical  Patient Education: patient educated on goals, OT role and benefits, plan of care, ADL adaptive strategies, disease specific education, transfer training, discharge recommendations  Learning Assessment:  patient verbalizes understanding, would benefit from continued

## 2023-10-31 NOTE — PLAN OF CARE
Problem: Pain  Goal: Verbalizes/displays adequate comfort level or baseline comfort level  10/31/2023 1423 by Tirso Howard RN  Outcome: Progressing  Flowsheets (Taken 10/31/2023 1423)  Verbalizes/displays adequate comfort level or baseline comfort level:   Assess pain using appropriate pain scale   Administer analgesics based on type and severity of pain and evaluate response   Implement non-pharmacological measures as appropriate and evaluate response   Consider cultural and social influences on pain and pain management   Notify Licensed Independent Practitioner if interventions unsuccessful or patient reports new pain       Problem: Safety - Adult  Goal: Free from fall injury  10/31/2023 1423 by Tirso Howard RN  Outcome: Progressing  Flowsheets (Taken 10/31/2023 1423)  Free From Fall Injury:   Instruct family/caregiver on patient safety   Based on caregiver fall risk screen, instruct family/caregiver to ask for assistance with transferring infant if caregiver noted to have fall risk factors       Problem: Skin/Tissue Integrity  Goal: Absence of new skin breakdown  Description: 1. Monitor for areas of redness and/or skin breakdown  2. Assess vascular access sites hourly  3. Every 4-6 hours minimum:  Change oxygen saturation probe site  4. Every 4-6 hours:  If on nasal continuous positive airway pressure, respiratory therapy assess nares and determine need for appliance change or resting period.   10/31/2023 1423 by Tirso Howard RN  Outcome: Progressing     Problem: ABCDS Injury Assessment  Goal: Absence of physical injury  10/31/2023 1423 by Tirso Howard RN  Outcome: Progressing       Problem: Chronic Conditions and Co-morbidities  Goal: Patient's chronic conditions and co-morbidity symptoms are monitored and maintained or improved  10/31/2023 1423 by Tirso Howard RN  Outcome: Progressing  Flowsheets (Taken 10/31/2023 1423)  Care Plan - Patient's Chronic

## 2023-10-31 NOTE — PLAN OF CARE
Problem: Pain  Goal: Verbalizes/displays adequate comfort level or baseline comfort level  Outcome: Progressing     Problem: Safety - Adult  Goal: Free from fall injury  Outcome: Progressing     Problem: Skin/Tissue Integrity  Goal: Absence of new skin breakdown  Description: 1. Monitor for areas of redness and/or skin breakdown  2. Assess vascular access sites hourly  3. Every 4-6 hours minimum:  Change oxygen saturation probe site  4. Every 4-6 hours:  If on nasal continuous positive airway pressure, respiratory therapy assess nares and determine need for appliance change or resting period.   Outcome: Progressing     Problem: ABCDS Injury Assessment  Goal: Absence of physical injury  Outcome: Progressing     Problem: Chronic Conditions and Co-morbidities  Goal: Patient's chronic conditions and co-morbidity symptoms are monitored and maintained or improved  Outcome: Progressing

## 2023-10-31 NOTE — PROGRESS NOTES
MD Martínez Márquez MD Verne Fells, MD                                  Office: [phone]                 Fax: [phone]          [facility]                     NEPHROLOGY INPATIENT PROGRESS NOTE:     PATIENT NAME: Josh Pal  : 1955  MRN: [id number]      Name:  Josh Pal Date/Time of Admission: 10/27/2023 11:31 PM    CSN: [id number] Attending Provider: Justus Roland MD   Room/Bed: [address] [place], [place] [id number] : 1955 Age: 70 y.o. Subjective / interval history / nephrology update / medical decision making:   Patient was seen comfortably sitting up in chair,   Reported no active complaints/distress,   Renal labs noted  Has been started on heparin and Eliquis for pulmonary embolism. No worsening of respiratory distress. 1. ESRD on iHD: MWF.   - outpt HD center: [facility]. [address] [place] [address] nephrology  - Access: [facility], follow-up outpatient with her nephrologist for further access plans    Chronic 3L NC at [facility] as currently     S/p  HD - tolerated 3.8L Monday   Had Pre weight: 117.8  EDW: 114 kg    Will challenge DW (ECHO -  IVC size is dilated (>2.1 cm) and collapses < 50% with respiration - consistent with elevated RA pressure (15 mmHg). Next HD Wednesday     D/C plans - If stable after HD can be dc on Wednesday        Medical decision making- high complexity. Multiple complex health problems. Discussed with patient and treatment team.  Justus Roland MD   Thank you for allowing me to participate in this patient's care. Please do not hesitate to contact me for any questions/concerns. We will follow along with you. Adebayo Dawson MD  [facility] [address]   Phone: [phone] or [address]  Fax: [phone]    Severally ill, at risk of impending organ failure needing  higher level of care/monitoring.    Time spent that included face-to-face meeting/discussion with patient, patient's family -as

## 2023-10-31 NOTE — PROGRESS NOTES
Date of Admission: 10/27/2023      Assessment/Plan:  79-year-old lady with history of end-stage renal disease on hemodialysis, hypertension, hyperlipidemia, chronic diastolic heart failure, type 2 diabetes with neuropathy, paroxysmal atrial fibrillation, history of left BKA who presented with left upper back pleuritic chest pain with associated shortness of breath found to have left upper lobe pulmonary embolus and volume overload with peripheral edema. Principal Problem:    Pulmonary embolus, left (HCC)/ Gingival bleeding: Improved pain though still present. Controlled with analgesia. No shortness of breath  - Tolerating Eliquis  but has  slow bleeding from recent tooth extraction, consult dental surgery  - Continue pain management       Pericardial effusion: Noted on CTPA on admission. Effusion noted on a recent echo done 4 weeks ago. F/u echo 10/31 trace effusion       Volume overload:  Pt on bl o2 but  has bilateral crackles,  will repeat xr chest  d/w nephro about  HD tomorrow  BP improved    Active Problems:    ESRD on hemodialysis [facility]): Dialysis as per nephrology    Type 2 diabetes mellitus [facility]): Sliding scale insulin for glycemic control    HTN (hypertension): Seen nephrology and antihypertensives discontinued to allow for ultrafiltration tomorrow       Active Hospital Problems    Diagnosis     Pulmonary embolus, left (HCC) [I26.99]     Volume overload [E87.70]     ESRD on hemodialysis ([address]) [N18.6, Z99.2]     Type 2 diabetes mellitus ([address]) [E11.9]     HTN (hypertension) [I10]          DVT Prophylaxis: eliquis  Diet: ADULT DIET;  Regular; 5 carb choices (75 gm/meal)  Code Status: Full Code      Dispo - snf    All  follow up labs and imaging personally reviewed      Chief Complaint:   Chief Complaint   Patient presents with    Shoulder Pain     Pt via EMS from [facility], c/o right scapula pain 8/10 sudden onset at 10 pm, denies injury, has pacemaker and cardiac hx, denies cp and sob, 05:03.         XR CHEST PORTABLE   Final Result   1. Cardiac silhouette remains enlarged with pulmonary vascular congestion   and features of pulmonary edema. Could be cardiac decompensation or and or   volume overload. 2.  There is improved inflation of the lung bases and or decrease of previous   pleural effusions.          XR CHEST (2 VW)    (Results Pending)       Missy Stain  IP CONSULT TO DENTISBLAIR Villar MD

## 2023-10-31 NOTE — PROGRESS NOTES
[address] [facility] Department   Phone: [phone]    Physical Therapy    [] Initial Evaluation            [x] Daily Treatment Note         [] Discharge Summary      Patient: Murali Liu   : 1955   MRN: [id number]   Date of Service:  10/31/2023  Admitting Diagnosis: Pulmonary embolus, left [facility])  Current Admission Summary: Murali Liu is a 70 y.o. female who presents to the emergency department with complaint of pain to the right scapula that she rates as 8 of 10. Sudden onset of symptoms while sitting on the bedside commode and trying to bear down for a bowel movement. The patient denies any chest pain or shortness of breath. States that staff at the nursing home did apply a salve as well as give her her nightly pain medication without relief of symptoms. Reports that the pain feels \"muscular\" in nature and that she is unable to get comfortable. She does appear very uncomfortable, unable to sit back. She did have a full session of dialysis today without complication. Denies any headache, fever, lightheadedness, dizziness, visual disturbances. No chest pain or pressure. No neck pain. No shortness of breath, cough, or congestion. No abdominal pain, nausea, vomiting, diarrhea, constipation, or dysuria. No rash. *Pt found to have PE    *Pt has hx of L BKA in 2023. Past Medical History:  has a past medical history of Anemia, Atrial septal defect within oval fossa, CHB (complete heart block) (HCC), Chronic diastolic HF (heart failure) (HCC), Chronic left-sided low back pain, Chronic respiratory failure with hypoxia ([address]), Diabetic foot infection ([address]), DM2 (diabetes mellitus, type 2) ([address]), ESRD (end stage renal disease) ([address]), HLD (hyperlipidemia), HTN (hypertension), Hypothyroidism, MILE on CPAP, PAF (paroxysmal atrial fibrillation) ([address]), and Polyneuropathy. Past Surgical History:  has a past surgical history that includes Pacemaker insertion.   Discharge reach. Ambulation:  Ambulation not tested on this date secondary to prosthetic unavailable. Comments:    Stair Mobility:  Stair mobility not completed on this date. Comments:  Wheelchair Mobility:  No w/c mobility completed on this date. Comments:  Balance:  Static Sitting Balance: fair (+): maintains balance at SBA/supervision without use of UE support  Dynamic Sitting Balance: fair: maintains balance at CGA without use of UE support  Static Standing Balance: poor (-): requires max (A) to maintain balance  Dynamic Standing Balance: poor (-): requires max (A) to maintain balance  Comments: RW used in attempts to stand. Other Therapeutic Interventions  Pt assisted with upper and lower body dressing and bathing. See OT note for assist levels. Pt mouth bleeding due to tooth extraction and blood thinners. Pt taking blood clots out of mouth during treatment 2 times. Pt fatigued and SOB with activity, needing frequent rest breaks to recover before continuing. Pt reports LE prosthesis to be brought to hospital tomorrow. Discussed DC recommendations and reviewed there ex HEP that pt does daily. Functional Outcomes  AM-PAC Inpatient Mobility Raw Score : 12              Cognition  WFL  Orientation:    alert and oriented x 4  Command Following:   [facility]    Education  Barriers To Learning: none  Patient Education: patient educated on goals, PT role and benefits, plan of care, general safety, functional mobility training, disease specific education, transfer training, discharge recommendations  Learning Assessment:  patient verbalizes understanding, would benefit from continued reinforcement    Assessment  Activity Tolerance: Pt is limited by generalized weakness. Impairments Requiring Therapeutic Intervention: decreased functional mobility, decreased strength, decreased endurance, decreased balance  Prognosis: fair  Clinical Assessment: Pt performed post and lateral scoot transfer to go bed to chair.  Recommend maxi

## 2023-10-31 NOTE — PROGRESS NOTES
DDELLIE Trejo Goes returned call for consult and discussed patient complaint of continuous bleeding from previous extraction site. Dr Karen Tatum recommended placing wet tea bag to area to assist. Gave items to patient and will attempt over night. If persists, then can call Dr Karen Tatum at [phone].

## 2023-10-31 NOTE — PROGRESS NOTES
10/31/23 0004   RT Protocol   History Pulmonary Disease 0   Respiratory pattern 0   Breath sounds 2   Cough 0   Bronchodilator Assessment Score 2

## 2023-11-01 LAB
ANION GAP SERPL CALCULATED.3IONS-SCNC: 12 MMOL/L (ref 3–16)
BASOPHILS # BLD: 0.1 K/UL (ref 0–0.2)
BASOPHILS NFR BLD: 1.7 %
BUN SERPL-MCNC: 44 MG/DL (ref 7–20)
CALCIUM SERPL-MCNC: 8.9 MG/DL (ref 8.3–10.6)
CHLORIDE SERPL-SCNC: 93 MMOL/L (ref 99–110)
CO2 SERPL-SCNC: 24 MMOL/L (ref 21–32)
CREAT SERPL-MCNC: 4.8 MG/DL (ref 0.6–1.2)
DEPRECATED RDW RBC AUTO: 19.5 % (ref 12.4–15.4)
EOSINOPHIL # BLD: 0.2 K/UL (ref 0–0.6)
EOSINOPHIL NFR BLD: 5 %
GFR SERPLBLD CREATININE-BSD FMLA CKD-EPI: 9 ML/MIN/{1.73_M2}
GLUCOSE BLD-MCNC: 108 MG/DL (ref 70–99)
GLUCOSE BLD-MCNC: 113 MG/DL (ref 70–99)
GLUCOSE BLD-MCNC: 123 MG/DL (ref 70–99)
GLUCOSE BLD-MCNC: 136 MG/DL (ref 70–99)
GLUCOSE SERPL-MCNC: 101 MG/DL (ref 70–99)
HCT VFR BLD AUTO: 27.1 % (ref 36–48)
HGB BLD-MCNC: 8.4 G/DL (ref 12–16)
LYMPHOCYTES # BLD: 0.9 K/UL (ref 1–5.1)
LYMPHOCYTES NFR BLD: 20.5 %
MCH RBC QN AUTO: 30.8 PG (ref 26–34)
MCHC RBC AUTO-ENTMCNC: 31.2 G/DL (ref 31–36)
MCV RBC AUTO: 98.6 FL (ref 80–100)
MONOCYTES # BLD: 0.4 K/UL (ref 0–1.3)
MONOCYTES NFR BLD: 8.2 %
NEUTROPHILS # BLD: 2.8 K/UL (ref 1.7–7.7)
NEUTROPHILS NFR BLD: 64.6 %
PERFORMED ON: ABNORMAL
PLATELET # BLD AUTO: 113 K/UL (ref 135–450)
PMV BLD AUTO: 7.6 FL (ref 5–10.5)
POTASSIUM SERPL-SCNC: 5.8 MMOL/L (ref 3.5–5.1)
RBC # BLD AUTO: 2.74 M/UL (ref 4–5.2)
SODIUM SERPL-SCNC: 129 MMOL/L (ref 136–145)
WBC # BLD AUTO: 4.4 K/UL (ref 4–11)

## 2023-11-01 PROCEDURE — 2580000003 HC RX 258: Performed by: INTERNAL MEDICINE

## 2023-11-01 PROCEDURE — 6360000002 HC RX W HCPCS: Performed by: INTERNAL MEDICINE

## 2023-11-01 PROCEDURE — 1200000000 HC SEMI PRIVATE

## 2023-11-01 PROCEDURE — 94640 AIRWAY INHALATION TREATMENT: CPT

## 2023-11-01 PROCEDURE — 6360000002 HC RX W HCPCS: Performed by: NURSE PRACTITIONER

## 2023-11-01 PROCEDURE — 6370000000 HC RX 637 (ALT 250 FOR IP): Performed by: STUDENT IN AN ORGANIZED HEALTH CARE EDUCATION/TRAINING PROGRAM

## 2023-11-01 PROCEDURE — 36415 COLL VENOUS BLD VENIPUNCTURE: CPT

## 2023-11-01 PROCEDURE — 2500000003 HC RX 250 WO HCPCS: Performed by: STUDENT IN AN ORGANIZED HEALTH CARE EDUCATION/TRAINING PROGRAM

## 2023-11-01 PROCEDURE — 80048 BASIC METABOLIC PNL TOTAL CA: CPT

## 2023-11-01 PROCEDURE — 6370000000 HC RX 637 (ALT 250 FOR IP): Performed by: INTERNAL MEDICINE

## 2023-11-01 PROCEDURE — 85025 COMPLETE CBC W/AUTO DIFF WBC: CPT

## 2023-11-01 PROCEDURE — 90935 HEMODIALYSIS ONE EVALUATION: CPT

## 2023-11-01 RX ORDER — TRANEXAMIC ACID 100 MG/ML
1000 INJECTION, SOLUTION INTRAVENOUS 3 TIMES DAILY
Status: DISCONTINUED | OUTPATIENT
Start: 2023-11-01 | End: 2023-11-02 | Stop reason: HOSPADM

## 2023-11-01 RX ORDER — HEPARIN SODIUM 1000 [USP'U]/ML
INJECTION, SOLUTION INTRAVENOUS; SUBCUTANEOUS
Status: DISPENSED
Start: 2023-11-01 | End: 2023-11-01

## 2023-11-01 RX ADMIN — Medication 10 ML: at 13:23

## 2023-11-01 RX ADMIN — ALBUTEROL SULFATE 2.5 MG: 2.5 SOLUTION RESPIRATORY (INHALATION) at 21:15

## 2023-11-01 RX ADMIN — TRANEXAMIC ACID 1000 MG: 100 INJECTION, SOLUTION INTRAVENOUS at 20:57

## 2023-11-01 RX ADMIN — CETIRIZINE HYDROCHLORIDE 10 MG: 10 TABLET, FILM COATED ORAL at 13:24

## 2023-11-01 RX ADMIN — ACETAMINOPHEN 1000 MG: 500 TABLET ORAL at 20:57

## 2023-11-01 RX ADMIN — METOPROLOL TARTRATE 25 MG: 25 TABLET, FILM COATED ORAL at 20:58

## 2023-11-01 RX ADMIN — ATORVASTATIN CALCIUM 40 MG: 40 TABLET, FILM COATED ORAL at 20:58

## 2023-11-01 RX ADMIN — POLYETHYLENE GLYCOL 3350 17 G: 17 POWDER, FOR SOLUTION ORAL at 21:03

## 2023-11-01 RX ADMIN — MORPHINE SULFATE 4 MG: 4 INJECTION, SOLUTION INTRAMUSCULAR; INTRAVENOUS at 13:03

## 2023-11-01 RX ADMIN — ASPIRIN 81 MG 81 MG: 81 TABLET ORAL at 13:25

## 2023-11-01 RX ADMIN — ALLOPURINOL 300 MG: 300 TABLET ORAL at 13:24

## 2023-11-01 RX ADMIN — Medication 2 PUFF: at 21:15

## 2023-11-01 RX ADMIN — FERROUS SULFATE TAB 325 MG (65 MG ELEMENTAL FE) 325 MG: 325 (65 FE) TAB at 13:24

## 2023-11-01 RX ADMIN — APIXABAN 10 MG: 5 TABLET, FILM COATED ORAL at 13:24

## 2023-11-01 RX ADMIN — ALBUTEROL SULFATE 2.5 MG: 2.5 SOLUTION RESPIRATORY (INHALATION) at 15:47

## 2023-11-01 RX ADMIN — TRANEXAMIC ACID 1000 MG: 100 INJECTION, SOLUTION INTRAVENOUS at 17:24

## 2023-11-01 RX ADMIN — ALPRAZOLAM 0.25 MG: 0.25 TABLET ORAL at 13:03

## 2023-11-01 RX ADMIN — LEVOTHYROXINE SODIUM 100 MCG: 0.1 TABLET ORAL at 13:24

## 2023-11-01 RX ADMIN — METHOCARBAMOL 500 MG: 500 TABLET ORAL at 13:24

## 2023-11-01 RX ADMIN — MELOXICAM 15 MG: 7.5 TABLET ORAL at 13:25

## 2023-11-01 RX ADMIN — LINACLOTIDE 290 MCG: 145 CAPSULE, GELATIN COATED ORAL at 13:22

## 2023-11-01 RX ADMIN — ACETAMINOPHEN 1000 MG: 500 TABLET ORAL at 13:23

## 2023-11-01 RX ADMIN — Medication 10 ML: at 20:58

## 2023-11-01 RX ADMIN — ACETAMINOPHEN 1000 MG: 500 TABLET ORAL at 05:17

## 2023-11-01 RX ADMIN — APIXABAN 10 MG: 5 TABLET, FILM COATED ORAL at 20:57

## 2023-11-01 RX ADMIN — PANTOPRAZOLE SODIUM 40 MG: 40 TABLET, DELAYED RELEASE ORAL at 05:16

## 2023-11-01 ASSESSMENT — PAIN SCALES - GENERAL
PAINLEVEL_OUTOF10: 8
PAINLEVEL_OUTOF10: 9
PAINLEVEL_OUTOF10: 7

## 2023-11-01 ASSESSMENT — PAIN SCALES - WONG BAKER
WONGBAKER_NUMERICALRESPONSE: 0

## 2023-11-01 ASSESSMENT — PAIN DESCRIPTION - ORIENTATION
ORIENTATION: LOWER;MID
ORIENTATION: LOWER
ORIENTATION: LOWER;UPPER

## 2023-11-01 ASSESSMENT — PAIN DESCRIPTION - LOCATION
LOCATION: BACK

## 2023-11-01 ASSESSMENT — PAIN DESCRIPTION - DESCRIPTORS
DESCRIPTORS: ACHING;PRESSURE

## 2023-11-01 ASSESSMENT — PAIN - FUNCTIONAL ASSESSMENT
PAIN_FUNCTIONAL_ASSESSMENT: PREVENTS OR INTERFERES SOME ACTIVE ACTIVITIES AND ADLS

## 2023-11-01 ASSESSMENT — PAIN DESCRIPTION - PAIN TYPE
TYPE: CHRONIC PAIN

## 2023-11-01 NOTE — PLAN OF CARE
Problem: Pain  Goal: Verbalizes/displays adequate comfort level or baseline comfort level  Outcome: Progressing  Flowsheets (Taken 11/1/2023 1245)  Verbalizes/displays adequate comfort level or baseline comfort level:   Assess pain using appropriate pain scale   Administer analgesics based on type and severity of pain and evaluate response     Problem: Safety - Adult  Goal: Free from fall injury  Outcome: Progressing     Problem: Skin/Tissue Integrity  Goal: Absence of new skin breakdown  Description: 1. Monitor for areas of redness and/or skin breakdown  2. Assess vascular access sites hourly  3. Every 4-6 hours minimum:  Change oxygen saturation probe site  4. Every 4-6 hours:  If on nasal continuous positive airway pressure, respiratory therapy assess nares and determine need for appliance change or resting period.   Outcome: Progressing     Problem: ABCDS Injury Assessment  Goal: Absence of physical injury  Outcome: Progressing     Problem: Chronic Conditions and Co-morbidities  Goal: Patient's chronic conditions and co-morbidity symptoms are monitored and maintained or improved  Outcome: Progressing

## 2023-11-01 NOTE — PROGRESS NOTES
Treatment time: 3.5 hours  Net UF: 3000 ml     Pre weight: 115.5 kg  Post weight:112.9 kg  EDW: 114 kg    Access used: r tunneled cvc    Access function: good with  ml/min     Medications or blood products given: heparin dwells     Regular outpatient schedule: mwf     Summary of response to treatment: Patient tolerated treatment well and without any complications. Patient remained stable throughout entire treatment and upon the exiting the dyialysis suite via transport. Report given to Pricila Love RN and copy of dialysis treatment record placed in chart, to be scanned into EMR.

## 2023-11-01 NOTE — PROGRESS NOTES
Physical Therapy  Murali Liu    Attempted to see pt for PT treatment. Patient currently off the floor for HD. Will hold therapy at this time and will follow up with pt as schedule permits.  Thanks, Valentino Romberg, [place], DPT [id number]

## 2023-11-01 NOTE — PROGRESS NOTES
MD Bhaskar Araya MD Lia Pac, MD                                  Office: [phone]                 Fax: [phone]          591wed                     NEPHROLOGY INPATIENT PROGRESS NOTE:     PATIENT NAME: Reshma Pringle  : 1955  MRN: [id number]      Name:  Reshma Pringle Date/Time of Admission: 10/27/2023 11:31 PM    CSN: [id number] Attending Provider: Jason Richardson MD   Room/Bed: [address] [place] [address]-38 : 1955 Age: 70 y.o. Subjective / interval history / nephrology update / medical decision making:   Patient was seen comfortably sitting up in bed while on HD   Reported no active complaints/distress,     Renal labs noted  Was on heparin and Eliquis for pulmonary embolism. No worsening of respiratory distress. 1. ESRD on iHD: MWF.   - outpt HD center: [facility]. \[facility] [place] [facility]\"" [facility]AL [address] [facility] nephrology  - Access: [facility], follow-up outpatient with her nephrologist for further access plans    Chronic 3L NC at [facility] as currently     S/p  HD - tolerated 3.8L Monday   Had Pre weight: 117.8  EDW: 114 kg    HD today  - trying to challenge DW (ECHO -  IVC size is dilated (>2.1 cm) and collapses < 50% with respiration - consistent with elevated RA pressure (15 mmHg). But BP softer  I decreased her HD temperature, decreased flow, to allow more fluid removal       D/C plans - If stable after HD can be dc on today        Medical decision making- high complexity. Multiple complex health problems. Discussed with patient and treatment team.  Jason Richardson MD  , HD teams    Thank you for allowing me to participate in this patient's care. Please do not hesitate to contact me for any questions/concerns. We will follow along with you. Kaylen Ludwig MD  [facility] [address]   Phone: [phone] or [address] [employer]  Fax: [phone]    Severally ill, at risk of impending organ failure needing  higher level of care/monitoring.

## 2023-11-02 VITALS
HEIGHT: 67 IN | HEART RATE: 70 BPM | SYSTOLIC BLOOD PRESSURE: 133 MMHG | DIASTOLIC BLOOD PRESSURE: 62 MMHG | WEIGHT: 248.9 LBS | TEMPERATURE: 98.3 F | RESPIRATION RATE: 18 BRPM | OXYGEN SATURATION: 99 % | BODY MASS INDEX: 39.07 KG/M2

## 2023-11-02 PROBLEM — Z98.818 POSTPROCEDURAL HEMORRHAGE DUE TO COMPLICATION OF ORAL SURGERY: Status: ACTIVE | Noted: 2023-11-02

## 2023-11-02 PROBLEM — K91.840 POSTPROCEDURAL HEMORRHAGE DUE TO COMPLICATION OF ORAL SURGERY: Status: ACTIVE | Noted: 2023-11-02

## 2023-11-02 LAB
ALBUMIN SERPL-MCNC: 4 G/DL (ref 3.4–5)
ANION GAP SERPL CALCULATED.3IONS-SCNC: 12 MMOL/L (ref 3–16)
BUN SERPL-MCNC: 32 MG/DL (ref 7–20)
CALCIUM SERPL-MCNC: 9.3 MG/DL (ref 8.3–10.6)
CHLORIDE SERPL-SCNC: 95 MMOL/L (ref 99–110)
CO2 SERPL-SCNC: 26 MMOL/L (ref 21–32)
CREAT SERPL-MCNC: 3.5 MG/DL (ref 0.6–1.2)
DEPRECATED RDW RBC AUTO: 19.7 % (ref 12.4–15.4)
GFR SERPLBLD CREATININE-BSD FMLA CKD-EPI: 14 ML/MIN/{1.73_M2}
GLUCOSE BLD-MCNC: 111 MG/DL (ref 70–99)
GLUCOSE BLD-MCNC: 120 MG/DL (ref 70–99)
GLUCOSE SERPL-MCNC: 105 MG/DL (ref 70–99)
HCT VFR BLD AUTO: 26.2 % (ref 36–48)
HGB BLD-MCNC: 8.1 G/DL (ref 12–16)
MCH RBC QN AUTO: 30.7 PG (ref 26–34)
MCHC RBC AUTO-ENTMCNC: 31 G/DL (ref 31–36)
MCV RBC AUTO: 99 FL (ref 80–100)
PERFORMED ON: ABNORMAL
PERFORMED ON: ABNORMAL
PHOSPHATE SERPL-MCNC: 3.5 MG/DL (ref 2.5–4.9)
PLATELET # BLD AUTO: 105 K/UL (ref 135–450)
PMV BLD AUTO: 7.5 FL (ref 5–10.5)
POTASSIUM SERPL-SCNC: 5.1 MMOL/L (ref 3.5–5.1)
RBC # BLD AUTO: 2.64 M/UL (ref 4–5.2)
SODIUM SERPL-SCNC: 133 MMOL/L (ref 136–145)
WBC # BLD AUTO: 3.8 K/UL (ref 4–11)

## 2023-11-02 PROCEDURE — 36415 COLL VENOUS BLD VENIPUNCTURE: CPT

## 2023-11-02 PROCEDURE — 94761 N-INVAS EAR/PLS OXIMETRY MLT: CPT

## 2023-11-02 PROCEDURE — 80069 RENAL FUNCTION PANEL: CPT

## 2023-11-02 PROCEDURE — 6360000002 HC RX W HCPCS: Performed by: INTERNAL MEDICINE

## 2023-11-02 PROCEDURE — 85027 COMPLETE CBC AUTOMATED: CPT

## 2023-11-02 PROCEDURE — 2500000003 HC RX 250 WO HCPCS: Performed by: STUDENT IN AN ORGANIZED HEALTH CARE EDUCATION/TRAINING PROGRAM

## 2023-11-02 PROCEDURE — 6370000000 HC RX 637 (ALT 250 FOR IP): Performed by: INTERNAL MEDICINE

## 2023-11-02 PROCEDURE — 97530 THERAPEUTIC ACTIVITIES: CPT

## 2023-11-02 PROCEDURE — 94640 AIRWAY INHALATION TREATMENT: CPT

## 2023-11-02 PROCEDURE — 6360000002 HC RX W HCPCS: Performed by: NURSE PRACTITIONER

## 2023-11-02 PROCEDURE — 2700000000 HC OXYGEN THERAPY PER DAY

## 2023-11-02 PROCEDURE — 2580000003 HC RX 258: Performed by: INTERNAL MEDICINE

## 2023-11-02 PROCEDURE — 6370000000 HC RX 637 (ALT 250 FOR IP): Performed by: STUDENT IN AN ORGANIZED HEALTH CARE EDUCATION/TRAINING PROGRAM

## 2023-11-02 RX ORDER — TRANEXAMIC ACID 100 MG/ML
1000 INJECTION, SOLUTION INTRAVENOUS 3 TIMES DAILY
Qty: 60.75 ML | Refills: 1 | Status: ON HOLD | OUTPATIENT
Start: 2023-11-02 | End: 2023-11-09 | Stop reason: HOSPADM

## 2023-11-02 RX ORDER — OXYMETAZOLINE HYDROCHLORIDE 0.05 G/100ML
2 SPRAY NASAL ONCE
Status: SHIPPED | OUTPATIENT
Start: 2023-11-02 | End: 2023-11-05

## 2023-11-02 RX ORDER — FERROUS SULFATE 325(65) MG
325 TABLET ORAL
Qty: 30 TABLET | Refills: 3 | Status: ON HOLD | OUTPATIENT
Start: 2023-11-02 | End: 2023-11-09 | Stop reason: SDUPTHER

## 2023-11-02 RX ADMIN — STANDARDIZED SENNA CONCENTRATE AND DOCUSATE SODIUM 1 TABLET: 8.6; 5 TABLET ORAL at 10:54

## 2023-11-02 RX ADMIN — LEVOTHYROXINE SODIUM 100 MCG: 0.1 TABLET ORAL at 10:55

## 2023-11-02 RX ADMIN — MELOXICAM 15 MG: 7.5 TABLET ORAL at 10:56

## 2023-11-02 RX ADMIN — CETIRIZINE HYDROCHLORIDE 10 MG: 10 TABLET, FILM COATED ORAL at 10:55

## 2023-11-02 RX ADMIN — ACETAMINOPHEN 1000 MG: 500 TABLET ORAL at 05:06

## 2023-11-02 RX ADMIN — APIXABAN 10 MG: 5 TABLET, FILM COATED ORAL at 10:58

## 2023-11-02 RX ADMIN — FERROUS SULFATE TAB 325 MG (65 MG ELEMENTAL FE) 325 MG: 325 (65 FE) TAB at 12:14

## 2023-11-02 RX ADMIN — ASPIRIN 81 MG 81 MG: 81 TABLET ORAL at 10:56

## 2023-11-02 RX ADMIN — LINACLOTIDE 290 MCG: 145 CAPSULE, GELATIN COATED ORAL at 12:06

## 2023-11-02 RX ADMIN — POLYETHYLENE GLYCOL 3350 17 G: 17 POWDER, FOR SOLUTION ORAL at 10:53

## 2023-11-02 RX ADMIN — PANTOPRAZOLE SODIUM 40 MG: 40 TABLET, DELAYED RELEASE ORAL at 05:06

## 2023-11-02 RX ADMIN — Medication 2 PUFF: at 07:28

## 2023-11-02 RX ADMIN — MORPHINE SULFATE 4 MG: 4 INJECTION, SOLUTION INTRAMUSCULAR; INTRAVENOUS at 11:00

## 2023-11-02 RX ADMIN — TRANEXAMIC ACID 1000 MG: 100 INJECTION, SOLUTION INTRAVENOUS at 10:18

## 2023-11-02 RX ADMIN — Medication 10 ML: at 11:04

## 2023-11-02 RX ADMIN — ALLOPURINOL 300 MG: 300 TABLET ORAL at 10:55

## 2023-11-02 RX ADMIN — METOPROLOL TARTRATE 25 MG: 25 TABLET, FILM COATED ORAL at 10:57

## 2023-11-02 RX ADMIN — ALBUTEROL SULFATE 2.5 MG: 2.5 SOLUTION RESPIRATORY (INHALATION) at 07:28

## 2023-11-02 RX ADMIN — METHOCARBAMOL 500 MG: 500 TABLET ORAL at 10:55

## 2023-11-02 ASSESSMENT — PAIN SCALES - WONG BAKER
WONGBAKER_NUMERICALRESPONSE: 0

## 2023-11-02 ASSESSMENT — PAIN SCALES - GENERAL
PAINLEVEL_OUTOF10: 9
PAINLEVEL_OUTOF10: 9

## 2023-11-02 ASSESSMENT — PAIN DESCRIPTION - LOCATION: LOCATION: BACK

## 2023-11-02 NOTE — PROGRESS NOTES
RN discharge summary from  [place] to a facility. This patient has had a discharge order placed. They are being discharged to [facility] and being picked up by [facility] medical. Discharge paperwork has been printed and faxed by [facility] completed by this RN. no further needs at this time. IV has been removed with no complications. Pt has all belongings present. Report has been called to [facility] and all questions have been answered.

## 2023-11-02 NOTE — PROGRESS NOTES
plan of care, general safety, functional mobility training, disease specific education, transfer training, discharge recommendations  Learning Assessment:  patient verbalizes understanding, would benefit from continued reinforcement    Assessment  Activity Tolerance: Pt is limited by generalized weakness. Impairments Requiring Therapeutic Intervention: decreased functional mobility, decreased strength, decreased endurance, decreased balance  Prognosis: fair  Clinical Assessment: Pt eager to participate in therapy. Due to leaving within a half hour, pt was supine in bed upon the end of the tx. Recommend maxi fred back to bed with nursing. Pt is a 69 y/o female who presents to the hospital with a PE. Pt is presenting below her baseline level of function and required up to SBA/CGA for performance of bed mobility and transfers. Pt will benefit from continued skilled PT to facilitate return to PLOF and to promote independence. Safety Interventions: patient left in chair, chair alarm in place, call light within reach, gait belt, and nurse notified    Plan  Frequency: 3-5 x/per week  Current Treatment Recommendations: strengthening, ROM, balance training, functional mobility training, transfer training, endurance training, patient/caregiver education, pain management, home exercise program, safety education, and equipment evaluation/education    Goals  Patient Goals: To regain independence   Short Term Goals:  Time Frame: By discharge  Patient will complete bed mobility at modified independent   Patient will complete transfers at minimal assistance     Above goals reviewed on 11/2/2023. All goals are ongoing at this time unless indicated above. Therapy Session Time      Individual Group Co-treatment   Time In     5011   Time Out     1208   Minutes     39       Timed Code Treatment Minutes:  39  Total Treatment Minutes:  44       Electronically Signed By: Susan Gay PTA    I agree with the above note.   Goals addressed by PT.   Saeed Liu [place], DPT, ATC-R [id number]

## 2023-11-02 NOTE — CARE COORDINATION
Discharge Plan:  Patient discharge to:    [facility] [address] [place], [address]  Phone: [phone]  Fax: [phone]      SW faxed [address] and [address] to [phone]    RNLydia Victoria to call report to  transport with [address], 12:30pm  time     Patient advised of discharge and in agreement. [facility] admissions staff, Genevieve Turk ([phone]), advised of discharge and in agreement. HENS not needed for return to facility. SW intervention complete.         Daryn GRIFFIN, MAXINE, [facility] -   [phone]    Electronically signed by GABY Boyle on 11/2/2023 at 12:26 PM

## 2023-11-02 NOTE — PROGRESS NOTES
[address] [place] [address] Department   Phone: [phone]    Occupational Therapy    [] Initial Evaluation            [x] Daily Treatment Note         [] Discharge Summary      Patient: Ivan Mckeon   : 1955   MRN: [id number]   Date of Service:  2023    Admitting Diagnosis:  Pulmonary embolus, left [facility])  Current Admission Summary:    Pt via EMS from [facility] care, c/o right scapula pain 8/10 sudden onset at 10 pm, denies injury, has pacemaker and cardiac hx, denies cp and sob, baseline 3 L oxygen    Diagnosed with PE and B pleural effusions  Heparin therapy initiated 6AM 10/28   History BKA - Feb of this year   Past Medical History:  has a past medical history of Anemia, Atrial septal defect within oval fossa, CHB (complete heart block) (HCC), Chronic diastolic HF (heart failure) (HCC), Chronic left-sided low back pain, Chronic respiratory failure with hypoxia ([address]), Diabetic foot infection ([address]), DM2 (diabetes mellitus, type 2) ([address]), ESRD (end stage renal disease) ([address]), HLD (hyperlipidemia), HTN (hypertension), Hypothyroidism, MILE on CPAP, PAF (paroxysmal atrial fibrillation) ([address]), and Polyneuropathy. Past Surgical History:  has a past surgical history that includes Pacemaker insertion. Discharge Recommendations: Ivan Mckeon scored a 16/24 on the AM-PAC ADL Inpatient form. Current research shows that an AM-PAC score of 17 or less is typically not associated with a discharge to the patient's home setting. Based on the patient's AM-PAC score and their current ADL deficits, it is recommended that the patient have 3-5 sessions per week of Occupational Therapy at d/c to increase the patient's independence. Please see assessment section for further patient specific details. If patient discharges prior to next session this note will serve as a discharge summary. Please see below for the latest assessment towards goals.       DME Required For Sit: supervision  Sit to Supine: moderate assistance  Scooting: supervision  Comments: HOB elevated and use of bed rail, increased time and effort to perform. Returns to supine HOB flat and A for BLE, scoots up in bed with sup and bed rail use  Transfers:  Sit to stand transfer:2 person assistance with min A x2   Stand to sit transfer: 2 person assistance with min A x2   Comments: elevated EOB to chair in front of pt for balance x2, pt pushes from B bed rails up to chair with increased time. Functional Mobility  No functional mobility completed on this date secondary to unable . Balance:  Static Sitting Balance: good: independent with functional balance in unsupported position  Dynamic Sitting Balance: good: independent with functional balance in unsupported position  Static Standing Balance: poor: requires mod (A) to maintain balance  Dynamic Standing Balance: poor: requires mod (A) to maintain balance  Comments: standing at arm chair in from of pt, pt completes reaching for targets x2 in various planes, able to reach across midline, unable to tolerate upper or lower quadrants.  Pt tolerates first trial x~2-3minutes, second trial x~1 minute    Other Therapeutic Interventions       Functional Outcomes  AM-PAC Inpatient Daily Activity Raw Score: 16    Cognition  WFL  Orientation:    alert and oriented x 4  Command Following:   [facility]     Education  Barriers To Learning: physical  Patient Education: patient educated on goals, OT role and benefits, plan of care, ADL adaptive strategies, disease specific education, transfer training, discharge recommendations  Learning Assessment:  patient verbalizes understanding, would benefit from continued reinforcement    Assessment  Activity Tolerance: Fair - fatigues quickly  Impairments Requiring Therapeutic Intervention: decreased functional mobility, decreased ADL status, decreased strength, decreased endurance, decreased balance  Prognosis: good  Clinical Assessment: Patient

## 2023-11-02 NOTE — PROGRESS NOTES
MD Turner Toscano Ra, MD Tamera Leavens, MD                                  Office: [phone]                 Fax: [phone]          HY[place]IE[place]                     NEPHROLOGY INPATIENT PROGRESS NOTE:     PATIENT NAME: Nghia Caldwell  : 1955  MRN: [id number]      Name:  Nghia Caldwell Date/Time of Admission: 10/27/2023 11:31 PM    CSN: [id number] Attending Provider: Chantel Lenz MD   Room[facility]Bed: [facility]4833/6371-04 : 1955 Age: 70 y.o. Subjective / interval history / nephrology update / medical decision making:   Patient was seen comfortably sitting up in bed   Reported no active complaints/distress,     Renal labs noted  Was on heparin and Eliquis for pulmonary embolism. No worsening of respiratory distress. 1. ESRD on iHD: MWF.   - [facility] [address] [email or web address] nephrology  - Access: [facility], follow-up outpatient with her nephrologist for further access plans    Chronic 3L NC at [facility] as currently       S/P HD Wednesday  - trying to challenge DW (ECHO -  IVC size is dilated (>2.1 cm) and collapses < 50% with respiration - consistent with elevated RA pressure (15 mmHg). But BP softer  I decreased her HD temperature, decreased flow, to allow more fluid removal    After dialysis on Wednesday, she was below her dry weight  Post weight:112.9 kg  EDW: 114 kg              D/C plans -dialysis Friday, okay for discharge today. Next [facility] outpatient  Added on renal labs today   - better solutes now           Medical decision making- high complexity. Multiple complex health problems. Discussed with patient and treatment team.  Chantel Lenz MD  ,  teams    Thank you for allowing me to participate in this patient's care. Please do not hesitate to contact me for any questions/concerns. We will follow along with you.      Angela Mott MD  [facility] [address]   Phone: [phone] or [facility] [employer]  Fax:

## 2023-11-02 NOTE — CARE COORDINATION
11/02/23 1225   IMM Letter   IMM Letter given to Patient/Family/Significant other/Guardian/POA/by: Given to Patient by .    IMM Letter date given: 11/02/23   IMM Letter time given: 1203     Electronically signed by GABY Osborne on 11/2/2023 at 12:25 PM

## 2023-11-06 ENCOUNTER — APPOINTMENT (OUTPATIENT)
Dept: CT IMAGING | Age: 68
DRG: 291 | End: 2023-11-06
Payer: MEDICARE

## 2023-11-06 ENCOUNTER — HOSPITAL ENCOUNTER (INPATIENT)
Age: 68
LOS: 3 days | Discharge: NURSING, REHAB OR LONG-TERM CARE | DRG: 291 | End: 2023-11-09
Attending: STUDENT IN AN ORGANIZED HEALTH CARE EDUCATION/TRAINING PROGRAM | Admitting: STUDENT IN AN ORGANIZED HEALTH CARE EDUCATION/TRAINING PROGRAM
Payer: MEDICARE

## 2023-11-06 DIAGNOSIS — J81.0 ACUTE PULMONARY EDEMA (HCC): Primary | ICD-10-CM

## 2023-11-06 DIAGNOSIS — Z99.2 ESRD ON DIALYSIS (HCC): ICD-10-CM

## 2023-11-06 DIAGNOSIS — N18.6 ESRD ON DIALYSIS (HCC): ICD-10-CM

## 2023-11-06 DIAGNOSIS — M54.9 UPPER BACK PAIN ON RIGHT SIDE: ICD-10-CM

## 2023-11-06 LAB
ALBUMIN SERPL-MCNC: 4.4 G/DL (ref 3.4–5)
ALBUMIN/GLOB SERPL: 1.7 {RATIO} (ref 1.1–2.2)
ALP SERPL-CCNC: 172 U/L (ref 40–129)
ALT SERPL-CCNC: 8 U/L (ref 10–40)
ANION GAP SERPL CALCULATED.3IONS-SCNC: 16 MMOL/L (ref 3–16)
APTT BLD: 51 SEC (ref 22.7–35.9)
AST SERPL-CCNC: 16 U/L (ref 15–37)
BASOPHILS # BLD: 0 K/UL (ref 0–0.2)
BASOPHILS NFR BLD: 0.8 %
BILIRUB SERPL-MCNC: 0.9 MG/DL (ref 0–1)
BUN SERPL-MCNC: 23 MG/DL (ref 7–20)
CALCIUM SERPL-MCNC: 9.2 MG/DL (ref 8.3–10.6)
CHLORIDE SERPL-SCNC: 94 MMOL/L (ref 99–110)
CO2 SERPL-SCNC: 27 MMOL/L (ref 21–32)
CREAT SERPL-MCNC: 3.1 MG/DL (ref 0.6–1.2)
DEPRECATED RDW RBC AUTO: 19.6 % (ref 12.4–15.4)
EOSINOPHIL # BLD: 0.1 K/UL (ref 0–0.6)
EOSINOPHIL NFR BLD: 3.4 %
GFR SERPLBLD CREATININE-BSD FMLA CKD-EPI: 16 ML/MIN/{1.73_M2}
GLUCOSE SERPL-MCNC: 115 MG/DL (ref 70–99)
HCT VFR BLD AUTO: 26.9 % (ref 36–48)
HGB BLD-MCNC: 8.4 G/DL (ref 12–16)
INR PPP: 1.55 (ref 0.84–1.16)
LYMPHOCYTES # BLD: 0.8 K/UL (ref 1–5.1)
LYMPHOCYTES NFR BLD: 18.1 %
MCH RBC QN AUTO: 30.7 PG (ref 26–34)
MCHC RBC AUTO-ENTMCNC: 31.2 G/DL (ref 31–36)
MCV RBC AUTO: 98.3 FL (ref 80–100)
MONOCYTES # BLD: 0.3 K/UL (ref 0–1.3)
MONOCYTES NFR BLD: 7.8 %
NEUTROPHILS # BLD: 3 K/UL (ref 1.7–7.7)
NEUTROPHILS NFR BLD: 69.9 %
PLATELET # BLD AUTO: 141 K/UL (ref 135–450)
PMV BLD AUTO: 8.1 FL (ref 5–10.5)
POTASSIUM SERPL-SCNC: 4.1 MMOL/L (ref 3.5–5.1)
PROT SERPL-MCNC: 7 G/DL (ref 6.4–8.2)
PROTHROMBIN TIME: 18.6 SEC (ref 11.5–14.8)
RBC # BLD AUTO: 2.74 M/UL (ref 4–5.2)
SODIUM SERPL-SCNC: 137 MMOL/L (ref 136–145)
TROPONIN, HIGH SENSITIVITY: 203 NG/L (ref 0–14)
TROPONIN, HIGH SENSITIVITY: 203 NG/L (ref 0–14)
WBC # BLD AUTO: 4.3 K/UL (ref 4–11)

## 2023-11-06 PROCEDURE — 36415 COLL VENOUS BLD VENIPUNCTURE: CPT

## 2023-11-06 PROCEDURE — 84484 ASSAY OF TROPONIN QUANT: CPT

## 2023-11-06 PROCEDURE — 85025 COMPLETE CBC W/AUTO DIFF WBC: CPT

## 2023-11-06 PROCEDURE — 71260 CT THORAX DX C+: CPT

## 2023-11-06 PROCEDURE — 1200000000 HC SEMI PRIVATE

## 2023-11-06 PROCEDURE — 85610 PROTHROMBIN TIME: CPT

## 2023-11-06 PROCEDURE — 80053 COMPREHEN METABOLIC PANEL: CPT

## 2023-11-06 PROCEDURE — 6370000000 HC RX 637 (ALT 250 FOR IP): Performed by: PHYSICIAN ASSISTANT

## 2023-11-06 PROCEDURE — 85730 THROMBOPLASTIN TIME PARTIAL: CPT

## 2023-11-06 PROCEDURE — 6360000004 HC RX CONTRAST MEDICATION: Performed by: PHYSICIAN ASSISTANT

## 2023-11-06 PROCEDURE — 99285 EMERGENCY DEPT VISIT HI MDM: CPT

## 2023-11-06 PROCEDURE — 93005 ELECTROCARDIOGRAM TRACING: CPT | Performed by: PHYSICIAN ASSISTANT

## 2023-11-06 RX ORDER — BISACODYL 10 MG
10 SUPPOSITORY, RECTAL RECTAL DAILY PRN
COMMUNITY

## 2023-11-06 RX ORDER — OXYCODONE HYDROCHLORIDE AND ACETAMINOPHEN 5; 325 MG/1; MG/1
2 TABLET ORAL ONCE
Status: COMPLETED | OUTPATIENT
Start: 2023-11-06 | End: 2023-11-06

## 2023-11-06 RX ORDER — ONDANSETRON 4 MG/1
4 TABLET, ORALLY DISINTEGRATING ORAL ONCE
Status: COMPLETED | OUTPATIENT
Start: 2023-11-06 | End: 2023-11-06

## 2023-11-06 RX ORDER — LIDOCAINE 4 G/G
1 PATCH TOPICAL ONCE
Status: DISCONTINUED | OUTPATIENT
Start: 2023-11-06 | End: 2023-11-07

## 2023-11-06 RX ORDER — HYDROCORTISONE 25 MG/G
CREAM TOPICAL 2 TIMES DAILY PRN
Status: ON HOLD | COMMUNITY
End: 2023-11-09 | Stop reason: HOSPADM

## 2023-11-06 RX ADMIN — ONDANSETRON 4 MG: 4 TABLET, ORALLY DISINTEGRATING ORAL at 19:34

## 2023-11-06 RX ADMIN — IOPAMIDOL 75 ML: 755 INJECTION, SOLUTION INTRAVENOUS at 20:49

## 2023-11-06 RX ADMIN — OXYCODONE HYDROCHLORIDE AND ACETAMINOPHEN 2 TABLET: 5; 325 TABLET ORAL at 19:34

## 2023-11-06 ASSESSMENT — ENCOUNTER SYMPTOMS
COUGH: 0
VOMITING: 0
ABDOMINAL PAIN: 0
RHINORRHEA: 0
DIARRHEA: 0
BACK PAIN: 1
NAUSEA: 0
SHORTNESS OF BREATH: 0

## 2023-11-06 ASSESSMENT — PAIN - FUNCTIONAL ASSESSMENT
PAIN_FUNCTIONAL_ASSESSMENT: PREVENTS OR INTERFERES SOME ACTIVE ACTIVITIES AND ADLS
PAIN_FUNCTIONAL_ASSESSMENT: 0-10

## 2023-11-06 ASSESSMENT — PAIN DESCRIPTION - LOCATION
LOCATION: BACK
LOCATION: BACK

## 2023-11-06 ASSESSMENT — PAIN SCALES - GENERAL
PAINLEVEL_OUTOF10: 10
PAINLEVEL_OUTOF10: 10

## 2023-11-06 ASSESSMENT — PAIN DESCRIPTION - DESCRIPTORS: DESCRIPTORS: SHARP

## 2023-11-06 ASSESSMENT — PAIN DESCRIPTION - ORIENTATION: ORIENTATION: RIGHT

## 2023-11-06 ASSESSMENT — PAIN DESCRIPTION - PAIN TYPE: TYPE: ACUTE PAIN

## 2023-11-07 LAB
ANION GAP SERPL CALCULATED.3IONS-SCNC: 14 MMOL/L (ref 3–16)
BASOPHILS # BLD: 0 K/UL (ref 0–0.2)
BASOPHILS NFR BLD: 0.8 %
BUN SERPL-MCNC: 26 MG/DL (ref 7–20)
CALCIUM SERPL-MCNC: 9 MG/DL (ref 8.3–10.6)
CHLORIDE SERPL-SCNC: 95 MMOL/L (ref 99–110)
CO2 SERPL-SCNC: 27 MMOL/L (ref 21–32)
CREAT SERPL-MCNC: 3.5 MG/DL (ref 0.6–1.2)
DEPRECATED RDW RBC AUTO: 19.5 % (ref 12.4–15.4)
EKG ATRIAL RATE: 72 BPM
EKG DIAGNOSIS: NORMAL
EKG Q-T INTERVAL: 484 MS
EKG QRS DURATION: 180 MS
EKG QTC CALCULATION (BAZETT): 540 MS
EKG R AXIS: -81 DEGREES
EKG T AXIS: 95 DEGREES
EKG VENTRICULAR RATE: 75 BPM
EOSINOPHIL # BLD: 0.2 K/UL (ref 0–0.6)
EOSINOPHIL NFR BLD: 4.5 %
GFR SERPLBLD CREATININE-BSD FMLA CKD-EPI: 14 ML/MIN/{1.73_M2}
GLUCOSE BLD-MCNC: 105 MG/DL (ref 70–99)
GLUCOSE BLD-MCNC: 135 MG/DL (ref 70–99)
GLUCOSE BLD-MCNC: 162 MG/DL (ref 70–99)
GLUCOSE SERPL-MCNC: 125 MG/DL (ref 70–99)
HCT VFR BLD AUTO: 26.3 % (ref 36–48)
HGB BLD-MCNC: 8.3 G/DL (ref 12–16)
LYMPHOCYTES # BLD: 0.7 K/UL (ref 1–5.1)
LYMPHOCYTES NFR BLD: 21.2 %
MCH RBC QN AUTO: 31 PG (ref 26–34)
MCHC RBC AUTO-ENTMCNC: 31.5 G/DL (ref 31–36)
MCV RBC AUTO: 98.4 FL (ref 80–100)
MONOCYTES # BLD: 0.3 K/UL (ref 0–1.3)
MONOCYTES NFR BLD: 8.1 %
NEUTROPHILS # BLD: 2.2 K/UL (ref 1.7–7.7)
NEUTROPHILS NFR BLD: 65.4 %
NT-PROBNP SERPL-MCNC: ABNORMAL PG/ML (ref 0–124)
PERFORMED ON: ABNORMAL
PLATELET # BLD AUTO: 123 K/UL (ref 135–450)
PMV BLD AUTO: 7.9 FL (ref 5–10.5)
POTASSIUM SERPL-SCNC: 4.4 MMOL/L (ref 3.5–5.1)
RBC # BLD AUTO: 2.67 M/UL (ref 4–5.2)
SODIUM SERPL-SCNC: 136 MMOL/L (ref 136–145)
TROPONIN, HIGH SENSITIVITY: 183 NG/L (ref 0–14)
TROPONIN, HIGH SENSITIVITY: 189 NG/L (ref 0–14)
WBC # BLD AUTO: 3.4 K/UL (ref 4–11)

## 2023-11-07 PROCEDURE — 2580000003 HC RX 258: Performed by: NURSE PRACTITIONER

## 2023-11-07 PROCEDURE — 1200000000 HC SEMI PRIVATE

## 2023-11-07 PROCEDURE — 6360000002 HC RX W HCPCS: Performed by: INTERNAL MEDICINE

## 2023-11-07 PROCEDURE — 2700000000 HC OXYGEN THERAPY PER DAY

## 2023-11-07 PROCEDURE — 84484 ASSAY OF TROPONIN QUANT: CPT

## 2023-11-07 PROCEDURE — 6370000000 HC RX 637 (ALT 250 FOR IP): Performed by: NURSE PRACTITIONER

## 2023-11-07 PROCEDURE — 83880 ASSAY OF NATRIURETIC PEPTIDE: CPT

## 2023-11-07 PROCEDURE — 80048 BASIC METABOLIC PNL TOTAL CA: CPT

## 2023-11-07 PROCEDURE — 94640 AIRWAY INHALATION TREATMENT: CPT

## 2023-11-07 PROCEDURE — 90935 HEMODIALYSIS ONE EVALUATION: CPT

## 2023-11-07 PROCEDURE — 99223 1ST HOSP IP/OBS HIGH 75: CPT | Performed by: INTERNAL MEDICINE

## 2023-11-07 PROCEDURE — 85025 COMPLETE CBC W/AUTO DIFF WBC: CPT

## 2023-11-07 PROCEDURE — 94761 N-INVAS EAR/PLS OXIMETRY MLT: CPT

## 2023-11-07 PROCEDURE — 93010 ELECTROCARDIOGRAM REPORT: CPT | Performed by: INTERNAL MEDICINE

## 2023-11-07 PROCEDURE — 5A1D70Z PERFORMANCE OF URINARY FILTRATION, INTERMITTENT, LESS THAN 6 HOURS PER DAY: ICD-10-PCS | Performed by: INTERNAL MEDICINE

## 2023-11-07 PROCEDURE — 6370000000 HC RX 637 (ALT 250 FOR IP): Performed by: STUDENT IN AN ORGANIZED HEALTH CARE EDUCATION/TRAINING PROGRAM

## 2023-11-07 RX ORDER — POLYETHYLENE GLYCOL 3350 17 G/17G
17 POWDER, FOR SOLUTION ORAL DAILY PRN
Status: DISCONTINUED | OUTPATIENT
Start: 2023-11-07 | End: 2023-11-09 | Stop reason: HOSPADM

## 2023-11-07 RX ORDER — HYDROXYZINE HYDROCHLORIDE 25 MG/1
25 TABLET, FILM COATED ORAL 3 TIMES DAILY PRN
Status: DISCONTINUED | OUTPATIENT
Start: 2023-11-07 | End: 2023-11-09 | Stop reason: HOSPADM

## 2023-11-07 RX ORDER — POLYETHYLENE GLYCOL 3350 17 G/17G
17 POWDER, FOR SOLUTION ORAL DAILY
Status: DISCONTINUED | OUTPATIENT
Start: 2023-11-07 | End: 2023-11-07

## 2023-11-07 RX ORDER — SODIUM CHLORIDE 0.9 % (FLUSH) 0.9 %
5-40 SYRINGE (ML) INJECTION EVERY 12 HOURS SCHEDULED
Status: DISCONTINUED | OUTPATIENT
Start: 2023-11-07 | End: 2023-11-09 | Stop reason: HOSPADM

## 2023-11-07 RX ORDER — OXYCODONE HYDROCHLORIDE AND ACETAMINOPHEN 5; 325 MG/1; MG/1
1 TABLET ORAL 2 TIMES DAILY
Status: DISCONTINUED | OUTPATIENT
Start: 2023-11-07 | End: 2023-11-08

## 2023-11-07 RX ORDER — ACETAMINOPHEN 325 MG/1
650 TABLET ORAL EVERY 6 HOURS PRN
Status: DISCONTINUED | OUTPATIENT
Start: 2023-11-07 | End: 2023-11-09 | Stop reason: HOSPADM

## 2023-11-07 RX ORDER — INSULIN LISPRO 100 [IU]/ML
0-4 INJECTION, SOLUTION INTRAVENOUS; SUBCUTANEOUS NIGHTLY
Status: DISCONTINUED | OUTPATIENT
Start: 2023-11-07 | End: 2023-11-09 | Stop reason: HOSPADM

## 2023-11-07 RX ORDER — BUDESONIDE AND FORMOTEROL FUMARATE DIHYDRATE 80; 4.5 UG/1; UG/1
2 AEROSOL RESPIRATORY (INHALATION) 2 TIMES DAILY
Status: DISCONTINUED | OUTPATIENT
Start: 2023-11-07 | End: 2023-11-07 | Stop reason: CLARIF

## 2023-11-07 RX ORDER — ACETAMINOPHEN 650 MG/1
650 SUPPOSITORY RECTAL EVERY 6 HOURS PRN
Status: DISCONTINUED | OUTPATIENT
Start: 2023-11-07 | End: 2023-11-09 | Stop reason: HOSPADM

## 2023-11-07 RX ORDER — GABAPENTIN 100 MG/1
100 CAPSULE ORAL
Status: DISCONTINUED | OUTPATIENT
Start: 2023-11-07 | End: 2023-11-08

## 2023-11-07 RX ORDER — METHOCARBAMOL 500 MG/1
500 TABLET, FILM COATED ORAL 2 TIMES DAILY
Status: DISCONTINUED | OUTPATIENT
Start: 2023-11-07 | End: 2023-11-09 | Stop reason: HOSPADM

## 2023-11-07 RX ORDER — DEXTROSE MONOHYDRATE 100 MG/ML
INJECTION, SOLUTION INTRAVENOUS CONTINUOUS PRN
Status: DISCONTINUED | OUTPATIENT
Start: 2023-11-07 | End: 2023-11-09 | Stop reason: HOSPADM

## 2023-11-07 RX ORDER — ALBUTEROL SULFATE 2.5 MG/3ML
2.5 SOLUTION RESPIRATORY (INHALATION)
Status: DISCONTINUED | OUTPATIENT
Start: 2023-11-07 | End: 2023-11-09 | Stop reason: HOSPADM

## 2023-11-07 RX ORDER — SODIUM CHLORIDE 9 MG/ML
INJECTION, SOLUTION INTRAVENOUS PRN
Status: DISCONTINUED | OUTPATIENT
Start: 2023-11-07 | End: 2023-11-09 | Stop reason: HOSPADM

## 2023-11-07 RX ORDER — HYDROCORTISONE ACETATE 25 MG/1
25 SUPPOSITORY RECTAL DAILY PRN
Status: DISCONTINUED | OUTPATIENT
Start: 2023-11-07 | End: 2023-11-09 | Stop reason: HOSPADM

## 2023-11-07 RX ORDER — ONDANSETRON 4 MG/1
4 TABLET, ORALLY DISINTEGRATING ORAL EVERY 8 HOURS PRN
Status: DISCONTINUED | OUTPATIENT
Start: 2023-11-07 | End: 2023-11-09 | Stop reason: HOSPADM

## 2023-11-07 RX ORDER — INSULIN LISPRO 100 [IU]/ML
0-8 INJECTION, SOLUTION INTRAVENOUS; SUBCUTANEOUS
Status: DISCONTINUED | OUTPATIENT
Start: 2023-11-07 | End: 2023-11-09 | Stop reason: HOSPADM

## 2023-11-07 RX ORDER — SENNA AND DOCUSATE SODIUM 50; 8.6 MG/1; MG/1
1 TABLET, FILM COATED ORAL DAILY
Status: DISCONTINUED | OUTPATIENT
Start: 2023-11-07 | End: 2023-11-09 | Stop reason: HOSPADM

## 2023-11-07 RX ORDER — SODIUM CHLORIDE 0.9 % (FLUSH) 0.9 %
5-40 SYRINGE (ML) INJECTION PRN
Status: DISCONTINUED | OUTPATIENT
Start: 2023-11-07 | End: 2023-11-09 | Stop reason: HOSPADM

## 2023-11-07 RX ORDER — ALBUTEROL SULFATE 2.5 MG/3ML
2.5 SOLUTION RESPIRATORY (INHALATION) EVERY 4 HOURS PRN
Status: DISCONTINUED | OUTPATIENT
Start: 2023-11-07 | End: 2023-11-07

## 2023-11-07 RX ORDER — ONDANSETRON 2 MG/ML
4 INJECTION INTRAMUSCULAR; INTRAVENOUS EVERY 6 HOURS PRN
Status: DISCONTINUED | OUTPATIENT
Start: 2023-11-07 | End: 2023-11-09 | Stop reason: HOSPADM

## 2023-11-07 RX ORDER — LACTULOSE 10 G/15ML
10 SOLUTION ORAL 2 TIMES DAILY
Status: DISCONTINUED | OUTPATIENT
Start: 2023-11-07 | End: 2023-11-09 | Stop reason: HOSPADM

## 2023-11-07 RX ORDER — PANTOPRAZOLE SODIUM 40 MG/1
40 TABLET, DELAYED RELEASE ORAL
Status: DISCONTINUED | OUTPATIENT
Start: 2023-11-07 | End: 2023-11-09 | Stop reason: HOSPADM

## 2023-11-07 RX ORDER — ALLOPURINOL 300 MG/1
300 TABLET ORAL DAILY
Status: DISCONTINUED | OUTPATIENT
Start: 2023-11-07 | End: 2023-11-09 | Stop reason: HOSPADM

## 2023-11-07 RX ORDER — METHOCARBAMOL 500 MG/1
500 TABLET, FILM COATED ORAL ONCE
Status: COMPLETED | OUTPATIENT
Start: 2023-11-08 | End: 2023-11-08

## 2023-11-07 RX ORDER — LANOLIN ALCOHOL/MO/W.PET/CERES
3 CREAM (GRAM) TOPICAL NIGHTLY PRN
Status: DISCONTINUED | OUTPATIENT
Start: 2023-11-07 | End: 2023-11-09 | Stop reason: HOSPADM

## 2023-11-07 RX ORDER — LEVOTHYROXINE SODIUM 0.1 MG/1
100 TABLET ORAL
Status: DISCONTINUED | OUTPATIENT
Start: 2023-11-07 | End: 2023-11-09 | Stop reason: HOSPADM

## 2023-11-07 RX ORDER — FERROUS SULFATE 325(65) MG
325 TABLET ORAL
Status: DISCONTINUED | OUTPATIENT
Start: 2023-11-07 | End: 2023-11-09 | Stop reason: HOSPADM

## 2023-11-07 RX ORDER — OMEPRAZOLE 10 MG/1
20 CAPSULE, DELAYED RELEASE ORAL DAILY
Status: DISCONTINUED | OUTPATIENT
Start: 2023-11-07 | End: 2023-11-07 | Stop reason: CLARIF

## 2023-11-07 RX ORDER — CETIRIZINE HYDROCHLORIDE 10 MG/1
10 TABLET ORAL DAILY
Status: DISCONTINUED | OUTPATIENT
Start: 2023-11-07 | End: 2023-11-09 | Stop reason: HOSPADM

## 2023-11-07 RX ADMIN — Medication 2 PUFF: at 21:02

## 2023-11-07 RX ADMIN — APIXABAN 5 MG: 5 TABLET, FILM COATED ORAL at 01:14

## 2023-11-07 RX ADMIN — HYDROXYZINE HYDROCHLORIDE 25 MG: 25 TABLET, FILM COATED ORAL at 17:45

## 2023-11-07 RX ADMIN — ALLOPURINOL 300 MG: 300 TABLET ORAL at 14:52

## 2023-11-07 RX ADMIN — METHOCARBAMOL TABLETS 500 MG: 500 TABLET, COATED ORAL at 14:52

## 2023-11-07 RX ADMIN — APIXABAN 5 MG: 5 TABLET, FILM COATED ORAL at 09:07

## 2023-11-07 RX ADMIN — STANDARDIZED SENNA CONCENTRATE AND DOCUSATE SODIUM 1 TABLET: 8.6; 5 TABLET ORAL at 09:07

## 2023-11-07 RX ADMIN — CETIRIZINE HYDROCHLORIDE 10 MG: 10 TABLET, FILM COATED ORAL at 09:07

## 2023-11-07 RX ADMIN — OXYCODONE AND ACETAMINOPHEN 1 TABLET: 5; 325 TABLET ORAL at 09:07

## 2023-11-07 RX ADMIN — FERROUS SULFATE TAB 325 MG (65 MG ELEMENTAL FE) 325 MG: 325 (65 FE) TAB at 14:52

## 2023-11-07 RX ADMIN — ERYTHROPOIETIN 10000 UNITS: 10000 INJECTION, SOLUTION INTRAVENOUS; SUBCUTANEOUS at 14:11

## 2023-11-07 RX ADMIN — ALBUTEROL SULFATE 2.5 MG: 2.5 SOLUTION RESPIRATORY (INHALATION) at 21:07

## 2023-11-07 RX ADMIN — APIXABAN 5 MG: 5 TABLET, FILM COATED ORAL at 21:26

## 2023-11-07 RX ADMIN — LEVOTHYROXINE SODIUM 100 MCG: 0.1 TABLET ORAL at 14:52

## 2023-11-07 RX ADMIN — OXYCODONE AND ACETAMINOPHEN 1 TABLET: 5; 325 TABLET ORAL at 19:44

## 2023-11-07 RX ADMIN — Medication 2 PUFF: at 06:04

## 2023-11-07 RX ADMIN — METHOCARBAMOL TABLETS 500 MG: 500 TABLET, COATED ORAL at 01:15

## 2023-11-07 RX ADMIN — METOPROLOL TARTRATE 25 MG: 25 TABLET, FILM COATED ORAL at 01:15

## 2023-11-07 RX ADMIN — SODIUM CHLORIDE, PRESERVATIVE FREE 10 ML: 5 INJECTION INTRAVENOUS at 19:44

## 2023-11-07 RX ADMIN — OXYCODONE AND ACETAMINOPHEN 1 TABLET: 5; 325 TABLET ORAL at 01:18

## 2023-11-07 RX ADMIN — GABAPENTIN 100 MG: 100 CAPSULE ORAL at 21:26

## 2023-11-07 RX ADMIN — SODIUM CHLORIDE, PRESERVATIVE FREE 10 ML: 5 INJECTION INTRAVENOUS at 09:50

## 2023-11-07 RX ADMIN — GABAPENTIN 100 MG: 100 CAPSULE ORAL at 01:16

## 2023-11-07 RX ADMIN — METHOCARBAMOL TABLETS 500 MG: 500 TABLET, COATED ORAL at 09:07

## 2023-11-07 RX ADMIN — PANTOPRAZOLE SODIUM 40 MG: 40 TABLET, DELAYED RELEASE ORAL at 09:07

## 2023-11-07 RX ADMIN — ACETAMINOPHEN 650 MG: 325 TABLET ORAL at 17:45

## 2023-11-07 ASSESSMENT — PAIN SCALES - GENERAL
PAINLEVEL_OUTOF10: 2
PAINLEVEL_OUTOF10: 8
PAINLEVEL_OUTOF10: 8
PAINLEVEL_OUTOF10: 7
PAINLEVEL_OUTOF10: 8
PAINLEVEL_OUTOF10: 8
PAINLEVEL_OUTOF10: 0
PAINLEVEL_OUTOF10: 7

## 2023-11-07 ASSESSMENT — PAIN DESCRIPTION - DESCRIPTORS
DESCRIPTORS: ACHING;PRESSURE
DESCRIPTORS: ACHING
DESCRIPTORS: ACHING;PRESSURE
DESCRIPTORS: PRESSURE;DISCOMFORT
DESCRIPTORS: DISCOMFORT
DESCRIPTORS: DISCOMFORT;PRESSURE

## 2023-11-07 ASSESSMENT — PAIN DESCRIPTION - LOCATION
LOCATION: BACK
LOCATION: SHOULDER

## 2023-11-07 ASSESSMENT — PAIN - FUNCTIONAL ASSESSMENT
PAIN_FUNCTIONAL_ASSESSMENT: ACTIVITIES ARE NOT PREVENTED
PAIN_FUNCTIONAL_ASSESSMENT: PREVENTS OR INTERFERES SOME ACTIVE ACTIVITIES AND ADLS
PAIN_FUNCTIONAL_ASSESSMENT: ACTIVITIES ARE NOT PREVENTED
PAIN_FUNCTIONAL_ASSESSMENT: ACTIVITIES ARE NOT PREVENTED
PAIN_FUNCTIONAL_ASSESSMENT: PREVENTS OR INTERFERES SOME ACTIVE ACTIVITIES AND ADLS
PAIN_FUNCTIONAL_ASSESSMENT: ACTIVITIES ARE NOT PREVENTED

## 2023-11-07 ASSESSMENT — PAIN DESCRIPTION - PAIN TYPE
TYPE: ACUTE PAIN

## 2023-11-07 ASSESSMENT — PAIN DESCRIPTION - ORIENTATION
ORIENTATION: RIGHT;MID
ORIENTATION: UPPER;LOWER;RIGHT;LEFT
ORIENTATION: RIGHT;MID

## 2023-11-07 NOTE — ED NOTES
Pt sleeping, In bed on monitors.  Door closed for privacy and to decrease noise     Ji Bobby RN  11/07/23 1893

## 2023-11-07 NOTE — PROGRESS NOTES
Pharmacy Home Medication Reconciliation Note    A medication reconciliation has been completed for Justino Schultz 1955    [facility]  Information provided by: patient and facility list    The patient's home medication list is as follows: No current facility-administered medications on file prior to encounter. Current Outpatient Medications on File Prior to Encounter   Medication Sig Dispense Refill    bisacodyl (DULCOLAX) 10 MG suppository Place 1 suppository rectally daily as needed for Constipation      hydrocortisone (ANUSOL-HC) 2.5 % CREA rectal cream Place rectally 2 times daily as needed for Hemorrhoids      epoetin nathan (PROCRIT) 91846 UNIT/ML injection Inject 1 mL into the skin Once a week at 5 PM Receive 10,000 unit injection on Mondays at bedtime. magnesium hydroxide (MILK OF MAGNESIA) 400 MG/5ML suspension Take 30 mLs by mouth daily as needed for Constipation      tranexamic acid (CYKLOKAPRON) 1000 MG/10ML injection Apply 10 mLs topically in the morning, at noon, and at bedtime Apply after  every feed ,until bleeding resolves (Patient not taking: Reported on 11/6/2023) 60.75 mL 1    ferrous sulfate (IRON 325) 325 (65 Fe) MG tablet Take 1 tablet by mouth Daily with lunch (Patient taking differently: Take 1 tablet by mouth every other day) 30 tablet 3    apixaban (ELIQUIS) 5 MG TABS tablet Take 1 tablet by mouth 2 times daily 60 tablet 1    gabapentin (NEURONTIN) 100 MG capsule Take 1 capsule by mouth nightly. ENULOSE 10 GM/15ML SOLN solution Take 15 mLs by mouth See Admin Instructions Take 15 mL twice daily by mouth on non-dialysis days (Tuesday, Thursday, Saturday, Sunday).       omeprazole (PRILOSEC) 20 MG delayed release capsule Take 1 capsule by mouth daily      acetaminophen (TYLENOL) 325 MG tablet Take 2 tablets by mouth every 6 hours as needed for Pain      cetirizine (ZYRTEC) 10 MG tablet Take 1 tablet by mouth nightly      hydrocortisone (ANUSOL-HC) 25 MG suppository Place 1 suppository rectally daily as needed for Hemorrhoids      hydrOXYzine HCl (ATARAX) 25 MG tablet Take 1 tablet by mouth 2 times daily Can take up to three times daily if needed. methocarbamol (ROBAXIN) 500 MG tablet Take 1 tablet by mouth in the morning and 1 tablet in the evening. sennosides-docusate sodium (SENOKOT-S) 8.6-50 MG tablet Take 1 tablet by mouth three times a week Take one tablet by mouth on dialysis days (Monday, Wednesday, Friday)      melatonin 3 MG TABS tablet Take 1 tablet by mouth nightly as needed (sleep) 30 tablet 0    polyethylene glycol (GLYCOLAX) 17 g packet Take 1 packet by mouth daily (Patient not taking: Reported on 11/6/2023) 527 g 1    atorvastatin (LIPITOR) 40 MG tablet Take 1 tablet by mouth nightly (Patient not taking: Reported on 10/28/2023) 30 tablet 3    albuterol (PROVENTIL) (2.5 MG/3ML) 0.083% nebulizer solution       allopurinol (ZYLOPRIM) 300 MG tablet Take 1 tablet by mouth daily      SYMBICORT 80-4.5 MCG/ACT AERO Inhale 2 puffs into the lungs 2 times daily      levothyroxine (SYNTHROID) 100 MCG tablet Take 1 tablet by mouth every morning      LINZESS 290 MCG CAPS capsule Take 1 capsule by mouth daily (Patient not taking: Reported on 11/6/2023)      metoprolol tartrate (LOPRESSOR) 25 MG tablet Take 1 tablet by mouth nightly Indications: Patient takes      oxyCODONE-acetaminophen (PERCOCET) 5-325 MG per tablet Take 1 tablet by mouth 2 times daily. Can take up to Q6 prn. Patient is no longer taking atorvastatin, Linzess, Glycolax, or TXA. Of note, patient takes Eliquis 5 mg BID: received AM dose only today with all other AM meds. Patient also received Procrit 10,000 every Monday evening; did not receive today. Timing of last doses updated. Thank you,  Lady Juan Jose Norris

## 2023-11-07 NOTE — ED NOTES
Report given to [facility], all questions answered during handoff report, VSS at time of handoff report.      Mago Duarte RN  11/06/23 [id number]

## 2023-11-07 NOTE — H&P
Spouse name: None    Number of children: None    Years of education: None    Highest education level: None   Tobacco Use    Smoking status: Never    Smokeless tobacco: Never   Vaping Use    Vaping Use: Never used   Substance and Sexual Activity    Alcohol use: Never    Drug use: Never       Medications:   Medications:    lidocaine  1 patch TransDERmal Once      Infusions:   PRN Meds:      Labs      CBC:   Recent Labs     11/06/23 1938   WBC 4.3   HGB 8.4*        BMP:    Recent Labs     11/06/23 1938      K 4.1   CL 94*   CO2 27   BUN 23*   CREATININE 3.1*   GLUCOSE 115*     Hepatic:   Recent Labs     11/06/23 1938   AST 16   ALT 8*   BILITOT 0.9   ALKPHOS 172*     Lipids:   Lab Results   Component Value Date/Time    HDL 42 10/02/2023 05:12 AM     Hemoglobin A1C:   Lab Results   Component Value Date/Time    LABA1C 4.4 10/28/2023 10:33 AM     TSH: No results found for: \"TSH\"  Troponin: No results found for: \"TROPONINT\"  Lactic Acid: No results for input(s): \"LACTA\" in the last 72 hours. BNP: No results for input(s): \"PROBNP\" in the last 72 hours. UA:  Lab Results   Component Value Date/Time    NITRU Negative 09/28/2023 05:17 AM    COLORU DARK YELLOW 09/28/2023 05:17 AM    PHUR 5.0 09/28/2023 05:17 AM    WBCUA 1403 09/28/2023 05:17 AM    RBCUA 83 09/28/2023 05:17 AM    BACTERIA Rare 09/28/2023 05:17 AM    CLARITYU TURBID 09/28/2023 05:17 AM    SPECGRAV 1.020 09/28/2023 05:17 AM    LEUKOCYTESUR LARGE 09/28/2023 05:17 AM    UROBILINOGEN 1.0 09/28/2023 05:17 AM    BILIRUBINUR MODERATE 09/28/2023 05:17 AM    BLOODU MODERATE 09/28/2023 05:17 AM    GLUCOSEU Negative 09/28/2023 05:17 AM    KETUA TRACE 09/28/2023 05:17 AM     Urine Cultures:   Lab Results   Component Value Date/Time    LABURIN No growth at 18 to 36 hours 09/28/2023 05:11 AM     Blood Cultures:   Lab Results   Component Value Date/Time    BC No Growth after 4 days of incubation.  09/28/2023 10:23 AM     Lab Results   Component Value Date/Time    BLOODCULT2 No Growth after 4 days of incubation. 09/28/2023 10:31 AM     Organism: No results found for: \"ORG\"    Imaging/Diagnostics Last 24 Hours   CT CHEST PULMONARY EMBOLISM W CONTRAST    Result Date: 11/6/2023  EXAMINATION: CTA OF THE CHEST 11/6/2023 7:20 pm TECHNIQUE: CTA of the chest was performed after the administration of intravenous contrast.  Multiplanar reformatted images are provided for review. MIP images are provided for review. Automated exposure control, iterative reconstruction, and/or weight based adjustment of the mA/kV was utilized to reduce the radiation dose to as low as reasonably achievable. COMPARISON: 10/28/2023 HISTORY: ORDERING SYSTEM PROVIDED HISTORY: pe, r/o increqasing clot burden TECHNOLOGIST PROVIDED HISTORY: Reason for exam:->pe, r/o increqasing clot burden Decision Support Exception - unselect if not a suspected or confirmed emergency medical condition->Emergency Medical Condition (MA) Reason for Exam: , r/o increqasing clot burden FINDINGS: Pulmonary Arteries: Interval resolution of the thrombus in the anterior superior segment of the left upper lobe. No additional pulmonary emboli. Dilated main pulmonary arteries associated with pulmonary hypertension. Mediastinum: Severe cardiomegaly. No pericardial effusion. No thoracic adenopathy. No thoracic aortic aneurysm. Status post atrial appendage closure. Lungs/pleura: Small layering bilateral pleural effusions. Mild dependent atelectasis. Mosaic attenuation of the lungs with upper lobe interlobular septal thickening and perihilar ground-glass compatible with pulmonary edema. No central endobronchial lesion. Upper Abdomen: Moderate amount of upper abdominal ascites. Splenomegaly. Soft Tissues/Bones:  No acute abnormality of the visualized osseous structures. 1. Interval resolution of the left upper lobe segmental pulmonary embolus. No additional acute pulmonary emboli.  2. Small bilateral pleural

## 2023-11-07 NOTE — CONSULTS
Arliss Shavers, MD Karyle Bence, MD Wayne Nasuti, MD                                  Office: [phone]                 Fax: [phone]          [place]                     NEPHROLOGY CONSULTATION NOTE:     PATIENT NAME: Marilynn Garzon  : 1955  MRN: [id number]      Name:  Marilynn Garzon Date/Time of Admission: 2023  6:35 PM    CSN: [id number] Attending Provider: Norris Calderon MD   Room/Bed: [address] [place] [address] : 1955 Age: 70 y.o. Reason for Nephrology consult. Evaluation of patient with ESRD  dependent on dialysis, admitted with generalized weakness and worsening shortness of breath. History of Presenting complaint. Sushant Safe 70 y.o. of age,  And is known to have ESRD dependent on dialysis. Patient has regular hemodialysis treatment on . At hemodialysis unit. Patient has indwelling tunneled dialysis catheter. last hemodialysis treatment was today. [facility] nephrology-Dr. Taina Maldonado. [facility] dialysis unit. Currently in [facility] and doing in-house dialysis    Admitted with shortness of breath. History of PE on Eliquis. CT PE showed resolution of PE but also noted to have changes consistent with pulmonary edema. Patient reports she is not able to tolerate large amount of fluid removal due to cramping towards the end of dialysis. She reports chronic lower extremity edema. She has a left BKA with prosthesis. Patient reports having residual urine and urine output. Medications reviewed. Medical records reviewed    Past Medical History.     Past Medical History:   Diagnosis Date    Anemia     Atrial septal defect within oval fossa     CHB (complete heart block) ([facility])     Chronic diastolic HF (heart failure) (HCC)     Chronic left-sided low back pain     Chronic respiratory failure with hypoxia (HCC)     Diabetic foot infection (HCC)     DM2 (diabetes

## 2023-11-07 NOTE — CONSULTS
[address]  [phone]      Chief Complaint   Patient presents with    Back Pain     Report back pain, states it started after she left dialysis. States it feels like when she was diagnosed with a blood clot. States she is currently taking blood thinners. Pt was unable to finish all of dialysis today due to unable restless legs. Reason for consult:  elevated troponin    ASSESSMENT AND PLAN:  Elevated troponin  Acute on chronic diastolic heart failure; pulmonary edema, likely underdialyzed  ESRD on HD  PE on anticoagulation  Type 2 DM  HTN  Back pain  Chronic respiratory failure  COPD    -she just had echo a few days ago that was reassuring, LVEF has been ok  -she has no active anginal symptoms  -she has not aortic dissection on her CT scan  -troponin trend is overall reassuring and levels are about the same as they were last week  -she is markedly fluid overloaded  -she had a cath in 9/2021 at [facility] that showed just mild plaque  -unlikely that she will progress from just mild plaque to flow-limiting lesions in 2 years given very well-controlled risk factors  Thus, troponin leak is most likely due to demand ischemia    She is pacemaker dependent and hasn't seen her regular cardiology team in about 2 years    She has been in a nursing  home in [facility] since her left [facility] in February; she would prefer to be relocated to [place] where all her family is.     Will need to determine where she will actually be living in order to determine appropriate cardiology follow up    She still makes some urine; defer to nephrology if she should have diuretic therapy in addition to her diaylsis    Reviewing her CT scan, she has a lot of pulmonary edema, and she has significant swelling of her right leg, so I suspect her dry weight isn't dry enough      PLAN  -Troponin elevation is due to demand ischemia, she had a recent relatively normal coronary catheterization with well-controlled risk intermedius is a moderate sized vessel and has   20-30% stenosis. 4. Circumflex coronary artery is dominant and has 20-30%   stenosis proximally, less than 20% distally. First obtuse   marginal is bifurcating and has 20%-30% stenosis. Terminal   obtuse marginal forms a PDA, as a codominant circulation. 5. Right coronary artery is also codominant. Has 20-30%   stenosis, mid. Less than 20% distal. PDA and   posterolateral branches have less than 20% stenosis. 6. LV angiogram shows normal left ventricular chamber size   and wall motion with ejection fraction of 55%. LVEDP was   elevated at 25 mmHg. IMPRESSION:  1. Patent coronaries. 2. Elevated end-diastolic pressure suggesting hypertensive   heart disease. CARDIAC RHYTHM ASSESSMENT:  Holter/Event monitor  No results found for this or any previous visit. Old notes reviewed  Telemetry reviewd  Ekg personally reviewed  Chest xray personally reviewed  Echo, stress, cath, and/or other cardiac testing reviewed in detail   Medications and labs reviewed    [x] High (any 2)    A. Problems (any 1)  [x] Acute/Chronic Illness/injury posing threat to life or bodily function:  chronic respiratory failure, elevated troponin, recent PE  [] Severe exacerbation of chronic illness:    ---------------------------------------------------------------------  B. Risk of Treatment (any 1)   [] Drugs/treatments that require intensive monitoring for toxicity include:    [] Change in code status:    [] Decision to escalate care:    [] Major surgery/procedure with associated risk factors:    ----------------------------------------------------------------------  C.  Data (any 2)  [x] Discussed management of the case with:  Dr. Ade Galeano (hospitalist)  [x] Imaging personally reviewed and interpreted, includes:  Chest CT  [] Data Review (any 3)  [] Collateral history obtained from:    [x] All available Consultant notes from yesterday/today were reviewed  [x] All current labs were reviewed

## 2023-11-07 NOTE — PLAN OF CARE
Problem: Safety - Adult  Goal: Free from fall injury  Outcome: Progressing     Problem: Chronic Conditions and Co-morbidities  Goal: Patient's chronic conditions and co-morbidity symptoms are monitored and maintained or improved  Outcome: Progressing     Problem: Pain  Goal: Verbalizes/displays adequate comfort level or baseline comfort level  Outcome: Progressing     Problem: Skin/Tissue Integrity  Goal: Absence of new skin breakdown  Description: 1. Monitor for areas of redness and/or skin breakdown  2. Assess vascular access sites hourly  3. Every 4-6 hours minimum:  Change oxygen saturation probe site  4. Every 4-6 hours:  If on nasal continuous positive airway pressure, respiratory therapy assess nares and determine need for appliance change or resting period.   Outcome: Progressing     Problem: ABCDS Injury Assessment  Goal: Absence of physical injury  Outcome: Progressing

## 2023-11-07 NOTE — ED PROVIDER NOTES
[facility]        Pt Name: Deysi Bucio  MRN: [id number]  [address] 1955  Date of evaluation: 11/6/2023  Provider: Yulia Jeffries PA-C  PCP: Bijan Coleman MD  Note Started: 8:01 PM EST 11/6/23      RUMA. I have evaluated this patient. CHIEF COMPLAINT       Chief Complaint   Patient presents with    Back Pain     Report back pain, states it started after she left dialysis. States it feels like when she was diagnosed with a blood clot. States she is currently taking blood thinners. Pt was unable to finish all of dialysis today due to unable restless legs. HISTORY OF PRESENT ILLNESS: 1 or more Elements     History From: Patient  Limitations to history : None    Deysi Bucio is a 70 y.o. female who presents to the emergency department today for evaluation for right-sided back pain. The patient states that she has been experiencing right-sided upper back pain, and states that this has been ongoing since she left dialysis earlier today. The patient states that she does receive dialysis Monday Wednesday and Friday, however she states that she did not finish treatment today because she was in so much pain. Patient is reporting sharp pain to her right upper back, near where her scapula is, and states that her pain is worse with certain movements, and when she takes a deep breath in. The patient states that she was recently admitted, and diagnosed with a pulmonary embolism, she states that this does feel similar. She is anticoagulated on Eliquis. Patient is oxygen dependent on 3 L which is what she is requiring here. Patient denies feeling short of breath. Patient states that she has been making urine, denies any dysuria hematuria. She has no fevers. No cough. No vomiting or diarrhea. No fall or injury. Nursing Notes were all reviewed and agreed with or any disagreements were addressed in the HPI.     REVIEW OF

## 2023-11-07 NOTE — ED NOTES
Pt AOx4 and being transferred to BEd upstairs. Pt given meds available in ED pyxis. Pt  obtained and breakfast tray that had just arrived was sent with pt.       Ana Brown RN  11/07/23 [id number]

## 2023-11-07 NOTE — ED PROVIDER NOTES
EKG interpretation by me in absence of a face-to-face evaluation by me as follows:     The 12 lead EKG was interpreted by me independent of a cardiologist as follows:  Rate: normal with a rate of 75  Rhythm: normal ventricular pacemaker  Axis: normal  Intervals: left bundle branch block which limits further interpretation of EKG changes    Prior EKG comparison: EKG dated 10/27/23 is not significantly different        Clerance Gottron, MD  11/06/23 1948

## 2023-11-07 NOTE — PROGRESS NOTES
Treatment time: 3 hrs 10 min   Net UF: 2900 ml    Pre weight: 113.9 kg  Post weight: 111 kg  EDW: TBD    Access used: Rt CW TDC  Access function: Well tolerated, 350 BFR    Medications or blood products given: Heparin dwells    Regular outpatient schedule: MWF    Summary of response to treatment: Pt's system  clotted with 20 minutes remaining. Pt tolerated treatment well and remained stable throughout entire treatment and upon exiting hemodialysis suite. Copy of dialysis treatment record placed in chart, to be scanned into EMR.

## 2023-11-07 NOTE — ED NOTES
ED TO INPATIENT SBAR HANDOFF    Patient Name: Liudmila Richter   :  1955  70 y.o. MRN:  [id number]  Preferred Name  Domenico Rush  ED Room #:  ED-0009/09  Family/Caregiver Present no   Restraints no   Sitter no   Sepsis Risk Score Sepsis Risk Score: 2.76    Situation  Code Status: Full Code No additional code details. Allergies: Patient has no known allergies. Weight: Patient Vitals for the past 96 hrs (Last 3 readings):   Weight   23 1847 113.9 kg (251 lb)     Arrived from: home  Chief Complaint:   Chief Complaint   Patient presents with    Back Pain     Report back pain, states it started after she left dialysis. States it feels like when she was diagnosed with a blood clot. States she is currently taking blood thinners. Pt was unable to finish all of dialysis today due to unable restless legs. Hospital Problem/Diagnosis:  Principal Problem:    Volume overload  Resolved Problems:    * No resolved hospital problems. *    Imaging:   CT CHEST PULMONARY EMBOLISM W CONTRAST   Preliminary Result   1. Interval resolution of the left upper lobe segmental pulmonary embolus. No additional acute pulmonary emboli. 2. Small bilateral pleural effusion. Findings of pulmonary edema. 3. Moderate amount of upper abdominal ascites.            Abnormal labs:   Abnormal Labs Reviewed   CBC WITH AUTO DIFFERENTIAL - Abnormal; Notable for the following components:       Result Value    RBC 2.74 (*)     Hemoglobin 8.4 (*)     Hematocrit 26.9 (*)     RDW 19.6 (*)     Lymphocytes Absolute 0.8 (*)     All other components within normal limits   COMPREHENSIVE METABOLIC PANEL - Abnormal; Notable for the following components:    Chloride 94 (*)     Glucose 115 (*)     BUN 23 (*)     Creatinine 3.1 (*)     Est, Glom Filt Rate 16 (*)     Alkaline Phosphatase 172 (*)     ALT 8 (*)     All other components within normal limits   TROPONIN - Abnormal; Notable for the following components:    Troponin, High Sensitivity 203 (*)

## 2023-11-07 NOTE — PROGRESS NOTES
Patient has arrived to unit in stable condition. Vitals obtained. Patient is awake, alert and oriented. Respirations are easy and unlabored. Patient does not appear to be in distress. Patient oriented to room and call light. Plan of care discussed with patient, patient agreeable. Call light within reach. Fall precautions in place. No further concerns expressed at this time.

## 2023-11-07 NOTE — PROGRESS NOTES
4 Eyes Skin Assessment     NAME:  Ignacio Kidd  YOB: 1955  MEDICAL RECORD NUMBER:  [id number]    The patient is being assessed for  Admission    I agree that at least one RN has performed a thorough Head to Toe Skin Assessment on the patient. ALL assessment sites listed below have been assessed. Areas assessed by both nurses:    Head, Face, Ears, Shoulders, Back, Chest, Arms, Elbows, Hands, Sacrum. Buttock, Coccyx, Ischium, Legs. Feet and Heels, and Under Medical Devices         Does the Patient have a Wound? No noted wound(s)   Patient has bruising to bilateral upper extremities. Left leg below the knee amputation.   Ede Prevention initiated by RN: Yes  Wound Care Orders initiated by RN: No    Pressure Injury (Stage 3,4, Unstageable, DTI, NWPT, and Complex wounds) if present, place Wound referral order by RN under : No    New Ostomies, if present place, Ostomy referral order under : No     Nurse 1 eSignature: Electronically signed by Tina Méndez RN on 11/7/23 at 6:37 PM EST    **SHARE this note so that the co-signing nurse can place an eSignature**    Nurse 2 eSignature: Electronically signed by Jim Sarabia LPN on 68/5/96 at 2:11 PM EST

## 2023-11-08 ENCOUNTER — APPOINTMENT (OUTPATIENT)
Dept: GENERAL RADIOLOGY | Age: 68
DRG: 291 | End: 2023-11-08
Payer: MEDICARE

## 2023-11-08 LAB
ALBUMIN SERPL-MCNC: 4.2 G/DL (ref 3.4–5)
ANION GAP SERPL CALCULATED.3IONS-SCNC: 12 MMOL/L (ref 3–16)
BASOPHILS # BLD: 0 K/UL (ref 0–0.2)
BASOPHILS NFR BLD: 0.5 %
BUN SERPL-MCNC: 19 MG/DL (ref 7–20)
CALCIUM SERPL-MCNC: 8.9 MG/DL (ref 8.3–10.6)
CHLORIDE SERPL-SCNC: 96 MMOL/L (ref 99–110)
CO2 SERPL-SCNC: 27 MMOL/L (ref 21–32)
CREAT SERPL-MCNC: 3 MG/DL (ref 0.6–1.2)
DEPRECATED RDW RBC AUTO: 19.3 % (ref 12.4–15.4)
EOSINOPHIL # BLD: 0.1 K/UL (ref 0–0.6)
EOSINOPHIL NFR BLD: 3.1 %
FERRITIN SERPL IA-MCNC: 753.7 NG/ML (ref 15–150)
FOLATE SERPL-MCNC: 5.25 NG/ML (ref 4.78–24.2)
GFR SERPLBLD CREATININE-BSD FMLA CKD-EPI: 16 ML/MIN/{1.73_M2}
GLUCOSE BLD-MCNC: 106 MG/DL (ref 70–99)
GLUCOSE BLD-MCNC: 118 MG/DL (ref 70–99)
GLUCOSE BLD-MCNC: 122 MG/DL (ref 70–99)
GLUCOSE SERPL-MCNC: 119 MG/DL (ref 70–99)
HCT VFR BLD AUTO: 26.8 % (ref 36–48)
HGB BLD-MCNC: 8.2 G/DL (ref 12–16)
IRON SATN MFR SERPL: 27 % (ref 15–50)
IRON SERPL-MCNC: 47 UG/DL (ref 37–145)
LYMPHOCYTES # BLD: 0.9 K/UL (ref 1–5.1)
LYMPHOCYTES NFR BLD: 20.1 %
MCH RBC QN AUTO: 31.1 PG (ref 26–34)
MCHC RBC AUTO-ENTMCNC: 30.7 G/DL (ref 31–36)
MCV RBC AUTO: 101.1 FL (ref 80–100)
MONOCYTES # BLD: 0.4 K/UL (ref 0–1.3)
MONOCYTES NFR BLD: 8.3 %
NEUTROPHILS # BLD: 3.1 K/UL (ref 1.7–7.7)
NEUTROPHILS NFR BLD: 68 %
PATH INTERP BLD-IMP: NORMAL
PATH INTERP BLD-IMP: YES
PERFORMED ON: ABNORMAL
PHOSPHATE SERPL-MCNC: 2.9 MG/DL (ref 2.5–4.9)
PLATELET # BLD AUTO: 130 K/UL (ref 135–450)
PMV BLD AUTO: 7.4 FL (ref 5–10.5)
POTASSIUM SERPL-SCNC: 4.3 MMOL/L (ref 3.5–5.1)
RBC # BLD AUTO: 2.65 M/UL (ref 4–5.2)
SODIUM SERPL-SCNC: 135 MMOL/L (ref 136–145)
TIBC SERPL-MCNC: 174 UG/DL (ref 260–445)
VIT B12 SERPL-MCNC: 799 PG/ML (ref 211–911)
WBC # BLD AUTO: 4.6 K/UL (ref 4–11)

## 2023-11-08 PROCEDURE — 6370000000 HC RX 637 (ALT 250 FOR IP): Performed by: INTERNAL MEDICINE

## 2023-11-08 PROCEDURE — 6360000002 HC RX W HCPCS: Performed by: INTERNAL MEDICINE

## 2023-11-08 PROCEDURE — 5A1D70Z PERFORMANCE OF URINARY FILTRATION, INTERMITTENT, LESS THAN 6 HOURS PER DAY: ICD-10-PCS | Performed by: INTERNAL MEDICINE

## 2023-11-08 PROCEDURE — 94640 AIRWAY INHALATION TREATMENT: CPT

## 2023-11-08 PROCEDURE — 85025 COMPLETE CBC W/AUTO DIFF WBC: CPT

## 2023-11-08 PROCEDURE — 83550 IRON BINDING TEST: CPT

## 2023-11-08 PROCEDURE — 71045 X-RAY EXAM CHEST 1 VIEW: CPT

## 2023-11-08 PROCEDURE — 80069 RENAL FUNCTION PANEL: CPT

## 2023-11-08 PROCEDURE — 82728 ASSAY OF FERRITIN: CPT

## 2023-11-08 PROCEDURE — 6370000000 HC RX 637 (ALT 250 FOR IP): Performed by: NURSE PRACTITIONER

## 2023-11-08 PROCEDURE — 90935 HEMODIALYSIS ONE EVALUATION: CPT

## 2023-11-08 PROCEDURE — 82746 ASSAY OF FOLIC ACID SERUM: CPT

## 2023-11-08 PROCEDURE — 2580000003 HC RX 258: Performed by: NURSE PRACTITIONER

## 2023-11-08 PROCEDURE — 1200000000 HC SEMI PRIVATE

## 2023-11-08 PROCEDURE — 83540 ASSAY OF IRON: CPT

## 2023-11-08 PROCEDURE — 36415 COLL VENOUS BLD VENIPUNCTURE: CPT

## 2023-11-08 PROCEDURE — 82607 VITAMIN B-12: CPT

## 2023-11-08 RX ORDER — OXYCODONE HYDROCHLORIDE AND ACETAMINOPHEN 5; 325 MG/1; MG/1
1 TABLET ORAL EVERY 6 HOURS PRN
Status: DISCONTINUED | OUTPATIENT
Start: 2023-11-08 | End: 2023-11-09 | Stop reason: HOSPADM

## 2023-11-08 RX ORDER — GABAPENTIN 100 MG/1
100 CAPSULE ORAL 2 TIMES DAILY
Status: DISCONTINUED | OUTPATIENT
Start: 2023-11-08 | End: 2023-11-09 | Stop reason: HOSPADM

## 2023-11-08 RX ORDER — FUROSEMIDE 40 MG/1
80 TABLET ORAL DAILY
Status: DISCONTINUED | OUTPATIENT
Start: 2023-11-08 | End: 2023-11-09 | Stop reason: HOSPADM

## 2023-11-08 RX ORDER — HEPARIN SODIUM 1000 [USP'U]/ML
3200 INJECTION, SOLUTION INTRAVENOUS; SUBCUTANEOUS PRN
Status: DISCONTINUED | OUTPATIENT
Start: 2023-11-08 | End: 2023-11-09 | Stop reason: HOSPADM

## 2023-11-08 RX ADMIN — APIXABAN 5 MG: 5 TABLET, FILM COATED ORAL at 20:24

## 2023-11-08 RX ADMIN — LACTULOSE 10 G: 20 SOLUTION ORAL at 13:00

## 2023-11-08 RX ADMIN — METHOCARBAMOL TABLETS 500 MG: 500 TABLET, COATED ORAL at 07:45

## 2023-11-08 RX ADMIN — FERROUS SULFATE TAB 325 MG (65 MG ELEMENTAL FE) 325 MG: 325 (65 FE) TAB at 12:55

## 2023-11-08 RX ADMIN — LEVOTHYROXINE SODIUM 100 MCG: 0.1 TABLET ORAL at 05:23

## 2023-11-08 RX ADMIN — MELATONIN TAB 3 MG 3 MG: 3 TAB at 20:25

## 2023-11-08 RX ADMIN — OXYCODONE AND ACETAMINOPHEN 1 TABLET: 5; 325 TABLET ORAL at 18:24

## 2023-11-08 RX ADMIN — OXYCODONE AND ACETAMINOPHEN 1 TABLET: 5; 325 TABLET ORAL at 07:45

## 2023-11-08 RX ADMIN — SODIUM CHLORIDE, PRESERVATIVE FREE 10 ML: 5 INJECTION INTRAVENOUS at 20:25

## 2023-11-08 RX ADMIN — HYDROXYZINE HYDROCHLORIDE 25 MG: 25 TABLET, FILM COATED ORAL at 08:49

## 2023-11-08 RX ADMIN — METHOCARBAMOL 500 MG: 500 TABLET ORAL at 00:42

## 2023-11-08 RX ADMIN — GABAPENTIN 100 MG: 100 CAPSULE ORAL at 20:24

## 2023-11-08 RX ADMIN — METHOCARBAMOL TABLETS 500 MG: 500 TABLET, COATED ORAL at 15:09

## 2023-11-08 RX ADMIN — FUROSEMIDE 80 MG: 40 TABLET ORAL at 12:55

## 2023-11-08 RX ADMIN — ALBUTEROL SULFATE 2.5 MG: 2.5 SOLUTION RESPIRATORY (INHALATION) at 21:06

## 2023-11-08 RX ADMIN — ALLOPURINOL 300 MG: 300 TABLET ORAL at 12:55

## 2023-11-08 RX ADMIN — METOPROLOL TARTRATE 25 MG: 25 TABLET, FILM COATED ORAL at 12:55

## 2023-11-08 RX ADMIN — HYDROXYZINE HYDROCHLORIDE 25 MG: 25 TABLET, FILM COATED ORAL at 18:25

## 2023-11-08 RX ADMIN — APIXABAN 5 MG: 5 TABLET, FILM COATED ORAL at 12:54

## 2023-11-08 RX ADMIN — ACETAMINOPHEN 650 MG: 325 TABLET ORAL at 05:30

## 2023-11-08 RX ADMIN — Medication 2 PUFF: at 21:07

## 2023-11-08 RX ADMIN — CETIRIZINE HYDROCHLORIDE 10 MG: 10 TABLET, FILM COATED ORAL at 12:55

## 2023-11-08 RX ADMIN — MELATONIN TAB 3 MG 3 MG: 3 TAB at 00:44

## 2023-11-08 RX ADMIN — STANDARDIZED SENNA CONCENTRATE AND DOCUSATE SODIUM 1 TABLET: 8.6; 5 TABLET ORAL at 12:55

## 2023-11-08 RX ADMIN — PANTOPRAZOLE SODIUM 40 MG: 40 TABLET, DELAYED RELEASE ORAL at 05:23

## 2023-11-08 RX ADMIN — LACTULOSE 10 G: 20 SOLUTION ORAL at 20:24

## 2023-11-08 RX ADMIN — SODIUM CHLORIDE, PRESERVATIVE FREE 10 ML: 5 INJECTION INTRAVENOUS at 12:56

## 2023-11-08 ASSESSMENT — PAIN DESCRIPTION - LOCATION
LOCATION: GENERALIZED
LOCATION: SHOULDER
LOCATION: LEG;BACK;SHOULDER
LOCATION: SHOULDER

## 2023-11-08 ASSESSMENT — PAIN DESCRIPTION - DESCRIPTORS
DESCRIPTORS: ACHING
DESCRIPTORS: ACHING
DESCRIPTORS: ACHING;STABBING;THROBBING

## 2023-11-08 ASSESSMENT — PAIN SCALES - GENERAL
PAINLEVEL_OUTOF10: 8
PAINLEVEL_OUTOF10: 6
PAINLEVEL_OUTOF10: 9
PAINLEVEL_OUTOF10: 7
PAINLEVEL_OUTOF10: 5

## 2023-11-08 ASSESSMENT — PAIN DESCRIPTION - PAIN TYPE: TYPE: ACUTE PAIN

## 2023-11-08 ASSESSMENT — PAIN - FUNCTIONAL ASSESSMENT
PAIN_FUNCTIONAL_ASSESSMENT: PREVENTS OR INTERFERES SOME ACTIVE ACTIVITIES AND ADLS
PAIN_FUNCTIONAL_ASSESSMENT: ACTIVITIES ARE NOT PREVENTED

## 2023-11-08 ASSESSMENT — PAIN DESCRIPTION - ORIENTATION
ORIENTATION: RIGHT;LEFT

## 2023-11-08 NOTE — PROGRESS NOTES
V2.0    [facility]      Name:  Albino Stanley /Age/Sex: 1955  (74 y.o. female)   MRN & CSN:  [id number] & [id number] Encounter Date/Time: 2023 7:00 PM EST   Location:  [id number] PCP: Jo Carreno MD     Attending:Loida Rodriguez, [address] [place] [address] Day: 2    Assessment and Recommendations   Albino Stanley is a 70 y.o. female with pmh of left PE on Eliquis, ESRD on hemodialysis , DM 2 with neuropathy, hypertension, hypothyroid, chronic diastolic CHF, history of left BKA, who presents with right sided upper back pain. She was found to have elevated troponin above baseline, and Volume overload. Plan:     Volume overload:  Due to acute on chronic diastolic CHF in the setting of ESRD on HD. Noted small bilateral pleural effusion, pulmonary edema and abdominal wall ascites per CT chest.  ECHO 10/2023: LV EF of 55 to 60%. Pacemaker interrogation. Diuretic/admission per nephrology. End-stage renal disease on HD MWF:  Incomplete dialysis sessions due to bilateral lower extremity cramping. Nephrology consulted: follow up plans for hemodialysis. Elevated Troponin: Likely due to demand ischemia  EKG without evidence of acute ischemia. Cardiology consulted: asymptomatic. Cardiac catheterization 2021 without obstructive CAD. No further ischemic work-up. #Right upper back pain. Appears chronically intermittent. Described as sharp and similar presentation when she had the PE.    -CT PE: Interval resolution of left upper pole segmental PE. Small bilateral pleural effusion, pulmonary edema, moderate abdominal ascites. -Pain control. #Recent left upper lobe pulmonary embolism on 10/2023.  -Continue Eliquis. #Anemia of chronic disease. Hemoglobin stable at 8.4. Continue home ferrous sulfate. #Type 2 diabetes with peripheral neuropathy. A1c of 4.4 on 10/2023.  -SSI. Carb controlled diet. Hypoglycemic protocol as needed. GLUCOSE 115* 125*     Hepatic:   Recent Labs     11/06/23 1938   AST 16   ALT 8*   BILITOT 0.9   ALKPHOS 172*     Lipids:   Lab Results   Component Value Date/Time    HDL 42 10/02/2023 05:12 AM     Hemoglobin A1C:   Lab Results   Component Value Date/Time    LABA1C 4.4 10/28/2023 10:33 AM     TSH: No results found for: \"TSH\"  Troponin: No results found for: \"TROPONINT\"  Lactic Acid: No results for input(s): \"LACTA\" in the last 72 hours. BNP:   Recent Labs     11/07/23  0522   PROBNP 55,702*     UA:  Lab Results   Component Value Date/Time    NITRU Negative 09/28/2023 05:17 AM    COLORU DARK YELLOW 09/28/2023 05:17 AM    PHUR 5.0 09/28/2023 05:17 AM    WBCUA 1403 09/28/2023 05:17 AM    RBCUA 83 09/28/2023 05:17 AM    BACTERIA Rare 09/28/2023 05:17 AM    CLARITYU TURBID 09/28/2023 05:17 AM    SPECGRAV 1.020 09/28/2023 05:17 AM    LEUKOCYTESUR LARGE 09/28/2023 05:17 AM    UROBILINOGEN 1.0 09/28/2023 05:17 AM    BILIRUBINUR MODERATE 09/28/2023 05:17 AM    BLOODU MODERATE 09/28/2023 05:17 AM    GLUCOSEU Negative 09/28/2023 05:17 AM    KETUA TRACE 09/28/2023 05:17 AM     Urine Cultures:   Lab Results   Component Value Date/Time    LABURIN No growth at 18 to 36 hours 09/28/2023 05:11 AM     Blood Cultures:   Lab Results   Component Value Date/Time    BC No Growth after 4 days of incubation. 09/28/2023 10:23 AM     Lab Results   Component Value Date/Time    BLOODCULT2 No Growth after 4 days of incubation.  09/28/2023 10:31 AM     Organism: No results found for: \"ORG\"      Electronically signed by Sumeet Goodwin MD on 11/7/2023 at 7:00 PM

## 2023-11-08 NOTE — PROGRESS NOTES
Physician Progress Note      PATIENT:               Zahira Gao  CSN #:                  [id number]  :                       1955  ADMIT DATE:       2023 6:35 PM  [facility] DATE:  Chandler Barfield  PROVIDER #:        Yuki Estevez MD          QUERY TEXT:    Patient admitted with volume overload. Noted documentation of chronic   diastolic CHF in  H&P and acute on chronic  diastolic heart failure in    Cardio consult note. If possible, please document in progress notes and discharge summary if you   are evaluating and /or treating any of the following: The medical record reflects the following:  Risk Factors:  hx of chronic diastolic CHF    Clinical Indicators: Per H&P-  \"#Volume overload. #Chronic diastolic CHF. Not in acute exacerbation. \"  Per  Cardio consult note- \"Acute on chronic diastolic heart failure;   pulmonary edema, likely underdialyzed. S/p left BKA, right left 3+ edema. \"   CT Chest-2. Small bilateral pleural effusion. Findings of pulmonary edema.  CXR-CHF and mild pulmonary edema. proBNP- [id number]  Per  Nephro note- Neck. JVD visible. Chest: Bilateral Rales. Fluid removal   as tolerated with dialysis  ECHO 10/2023: LV EF of 55 to 60%. Treatment: Labs, Imaging, Cardio consult, dialysis  Options provided:  -- Acute on chronic diastolic heart failure confirmed POA and chronic   diastolic heart failure ruled out  -- Chronic diastolic heart failure confirmed and acute on chronic diastolic   heart failure ruled out  -- Other - I will add my own diagnosis  -- Disagree - Not applicable / Not valid  -- Disagree - Clinically unable to determine / Unknown  -- Refer to Clinical Documentation Reviewer    PROVIDER RESPONSE TEXT:    After study, acute on chronic diastolic heart failure confirmed POA and   chronic diastolic heart failure ruled out.     Query created by: Aixa Pagan on 2023 1:31 PM      Electronically signed by:  Yuki Estevez MD 2023 3:33

## 2023-11-08 NOTE — PROGRESS NOTES
Treatment time: 3.5 hours  Net UF: 3000 ml     Pre weight: 110.8 kg  Post weight:107.8 kg    Access used: rtdc    Access function: well with  ml/min     Medications or blood products given: heparin dwells     Regular outpatient schedule: mwf     Summary of response to treatment: Patient tolerated treatment well and without any complications. Patient remained stable throughout entire treatment and upon the pt exiting the dialysios suite via transport. Report given to ZACK Euceda and copy of dialysis treatment record placed in chart, to be scanned into EMR.

## 2023-11-08 NOTE — PROGRESS NOTES
Shift assessment completed. Routine vitals obtained and stable. Scheduled medications not given at this time d/t dialysis. Patient is awake, alert and oriented. Respirations are easy and unlabored. Patient does not appear to be in distress, resting comfortably at this time. Call light within reach.

## 2023-11-08 NOTE — CARE COORDINATION
Discharge Planning:     (IVETT) unable to see patient, she is off the floor at this time.     Electronically signed by Marilee Monsalve on 11/8/23 at 8:55 AM EST

## 2023-11-09 VITALS
HEIGHT: 67 IN | BODY MASS INDEX: 37.3 KG/M2 | DIASTOLIC BLOOD PRESSURE: 81 MMHG | RESPIRATION RATE: 18 BRPM | WEIGHT: 237.66 LBS | HEART RATE: 81 BPM | OXYGEN SATURATION: 96 % | SYSTOLIC BLOOD PRESSURE: 138 MMHG | TEMPERATURE: 98.3 F

## 2023-11-09 PROBLEM — J96.11 CHRONIC RESPIRATORY FAILURE WITH HYPOXIA (HCC): Status: ACTIVE | Noted: 2023-11-09

## 2023-11-09 PROBLEM — J44.9 COPD (CHRONIC OBSTRUCTIVE PULMONARY DISEASE) (HCC): Status: ACTIVE | Noted: 2023-11-09

## 2023-11-09 PROBLEM — D64.9 ANEMIA: Status: ACTIVE | Noted: 2023-11-09

## 2023-11-09 PROBLEM — G25.81 RESTLESS LEG SYNDROME: Status: ACTIVE | Noted: 2023-11-09

## 2023-11-09 PROBLEM — R79.89 ELEVATED TROPONIN: Status: ACTIVE | Noted: 2023-11-09

## 2023-11-09 LAB
ALBUMIN SERPL-MCNC: 4.1 G/DL (ref 3.4–5)
ANION GAP SERPL CALCULATED.3IONS-SCNC: 12 MMOL/L (ref 3–16)
BUN SERPL-MCNC: 14 MG/DL (ref 7–20)
CALCIUM SERPL-MCNC: 9.3 MG/DL (ref 8.3–10.6)
CHLORIDE SERPL-SCNC: 100 MMOL/L (ref 99–110)
CO2 SERPL-SCNC: 28 MMOL/L (ref 21–32)
CREAT SERPL-MCNC: 2.7 MG/DL (ref 0.6–1.2)
GFR SERPLBLD CREATININE-BSD FMLA CKD-EPI: 18 ML/MIN/{1.73_M2}
GLUCOSE BLD-MCNC: 100 MG/DL (ref 70–99)
GLUCOSE BLD-MCNC: 89 MG/DL (ref 70–99)
GLUCOSE SERPL-MCNC: 96 MG/DL (ref 70–99)
PATH INTERP BLD-IMP: NORMAL
PERFORMED ON: ABNORMAL
PERFORMED ON: NORMAL
PHOSPHATE SERPL-MCNC: 2.8 MG/DL (ref 2.5–4.9)
POTASSIUM SERPL-SCNC: 4.4 MMOL/L (ref 3.5–5.1)
SODIUM SERPL-SCNC: 140 MMOL/L (ref 136–145)
T4 FREE SERPL-MCNC: 1.2 NG/DL (ref 0.9–1.8)
TSH SERPL DL<=0.005 MIU/L-ACNC: 5.85 UIU/ML (ref 0.27–4.2)

## 2023-11-09 PROCEDURE — 6360000002 HC RX W HCPCS: Performed by: INTERNAL MEDICINE

## 2023-11-09 PROCEDURE — 97535 SELF CARE MNGMENT TRAINING: CPT

## 2023-11-09 PROCEDURE — 84443 ASSAY THYROID STIM HORMONE: CPT

## 2023-11-09 PROCEDURE — 97116 GAIT TRAINING THERAPY: CPT

## 2023-11-09 PROCEDURE — 97530 THERAPEUTIC ACTIVITIES: CPT

## 2023-11-09 PROCEDURE — 6370000000 HC RX 637 (ALT 250 FOR IP): Performed by: NURSE PRACTITIONER

## 2023-11-09 PROCEDURE — 94640 AIRWAY INHALATION TREATMENT: CPT

## 2023-11-09 PROCEDURE — 80069 RENAL FUNCTION PANEL: CPT

## 2023-11-09 PROCEDURE — 84439 ASSAY OF FREE THYROXINE: CPT

## 2023-11-09 PROCEDURE — 94761 N-INVAS EAR/PLS OXIMETRY MLT: CPT

## 2023-11-09 PROCEDURE — 6370000000 HC RX 637 (ALT 250 FOR IP): Performed by: INTERNAL MEDICINE

## 2023-11-09 PROCEDURE — 2700000000 HC OXYGEN THERAPY PER DAY

## 2023-11-09 PROCEDURE — 36415 COLL VENOUS BLD VENIPUNCTURE: CPT

## 2023-11-09 PROCEDURE — 97166 OT EVAL MOD COMPLEX 45 MIN: CPT

## 2023-11-09 PROCEDURE — 2580000003 HC RX 258: Performed by: NURSE PRACTITIONER

## 2023-11-09 PROCEDURE — 97162 PT EVAL MOD COMPLEX 30 MIN: CPT

## 2023-11-09 RX ORDER — FERROUS SULFATE 325(65) MG
325 TABLET ORAL EVERY OTHER DAY
Qty: 30 TABLET | Refills: 0 | Status: SHIPPED | OUTPATIENT
Start: 2023-11-09

## 2023-11-09 RX ORDER — FUROSEMIDE 80 MG
80 TABLET ORAL DAILY
Qty: 60 TABLET | Refills: 3
Start: 2023-11-10

## 2023-11-09 RX ADMIN — ALLOPURINOL 300 MG: 300 TABLET ORAL at 09:58

## 2023-11-09 RX ADMIN — APIXABAN 5 MG: 5 TABLET, FILM COATED ORAL at 10:00

## 2023-11-09 RX ADMIN — STANDARDIZED SENNA CONCENTRATE AND DOCUSATE SODIUM 1 TABLET: 8.6; 5 TABLET ORAL at 10:00

## 2023-11-09 RX ADMIN — METHOCARBAMOL TABLETS 500 MG: 500 TABLET, COATED ORAL at 17:38

## 2023-11-09 RX ADMIN — PANTOPRAZOLE SODIUM 40 MG: 40 TABLET, DELAYED RELEASE ORAL at 05:41

## 2023-11-09 RX ADMIN — OXYCODONE AND ACETAMINOPHEN 1 TABLET: 5; 325 TABLET ORAL at 10:00

## 2023-11-09 RX ADMIN — Medication 2 PUFF: at 08:46

## 2023-11-09 RX ADMIN — HYDROXYZINE HYDROCHLORIDE 25 MG: 25 TABLET, FILM COATED ORAL at 01:05

## 2023-11-09 RX ADMIN — FERROUS SULFATE TAB 325 MG (65 MG ELEMENTAL FE) 325 MG: 325 (65 FE) TAB at 17:38

## 2023-11-09 RX ADMIN — DICLOFENAC SODIUM 2 G: 10 GEL TOPICAL at 17:38

## 2023-11-09 RX ADMIN — METOPROLOL TARTRATE 25 MG: 25 TABLET, FILM COATED ORAL at 10:00

## 2023-11-09 RX ADMIN — ALBUTEROL SULFATE 2.5 MG: 2.5 SOLUTION RESPIRATORY (INHALATION) at 08:46

## 2023-11-09 RX ADMIN — METHOCARBAMOL TABLETS 500 MG: 500 TABLET, COATED ORAL at 09:58

## 2023-11-09 RX ADMIN — LEVOTHYROXINE SODIUM 100 MCG: 0.1 TABLET ORAL at 05:41

## 2023-11-09 RX ADMIN — GABAPENTIN 100 MG: 100 CAPSULE ORAL at 10:00

## 2023-11-09 RX ADMIN — FUROSEMIDE 80 MG: 40 TABLET ORAL at 09:59

## 2023-11-09 RX ADMIN — OXYCODONE AND ACETAMINOPHEN 1 TABLET: 5; 325 TABLET ORAL at 01:05

## 2023-11-09 RX ADMIN — CETIRIZINE HYDROCHLORIDE 10 MG: 10 TABLET, FILM COATED ORAL at 09:58

## 2023-11-09 RX ADMIN — SODIUM CHLORIDE, PRESERVATIVE FREE 10 ML: 5 INJECTION INTRAVENOUS at 10:03

## 2023-11-09 ASSESSMENT — PAIN DESCRIPTION - LOCATION
LOCATION: SHOULDER
LOCATION: LEG;BACK
LOCATION: SHOULDER
LOCATION: SHOULDER

## 2023-11-09 ASSESSMENT — PAIN SCALES - GENERAL
PAINLEVEL_OUTOF10: 8
PAINLEVEL_OUTOF10: 3
PAINLEVEL_OUTOF10: 7

## 2023-11-09 ASSESSMENT — PAIN DESCRIPTION - ORIENTATION
ORIENTATION: RIGHT;LEFT

## 2023-11-09 ASSESSMENT — PAIN DESCRIPTION - DESCRIPTORS
DESCRIPTORS: ACHING;CRAMPING
DESCRIPTORS: ACHING;THROBBING
DESCRIPTORS: ACHING;CRAMPING

## 2023-11-09 ASSESSMENT — PAIN DESCRIPTION - PAIN TYPE: TYPE: ACUTE PAIN

## 2023-11-09 NOTE — PLAN OF CARE
Problem: Chronic Conditions and Co-morbidities  Goal: Patient's chronic conditions and co-morbidity symptoms are monitored and maintained or improved  11/9/2023 0046 by Jim Miramontes RN  Flowsheets (Taken 11/8/2023 1945)  Care Plan - Patient's Chronic Conditions and Co-Morbidity Symptoms are Monitored and Maintained or Improved: Monitor and assess patient's chronic conditions and comorbid symptoms for stability, deterioration, or improvement  11/9/2023 0046 by Jim Miramontes RN  Outcome: Progressing  Flowsheets (Taken 11/8/2023 1945)  Care Plan - Patient's Chronic Conditions and Co-Morbidity Symptoms are Monitored and Maintained or Improved: Monitor and assess patient's chronic conditions and comorbid symptoms for stability, deterioration, or improvement  11/8/2023 1832 by Alexandria Mercado RN  Outcome: Progressing

## 2023-11-09 NOTE — DISCHARGE SUMMARY
11/06/23 1938   AST 16   ALT 8*   BILITOT 0.9   ALKPHOS 172*     Lipids:   Lab Results   Component Value Date/Time    HDL 42 10/02/2023 05:12 AM     Hemoglobin A1C:   Lab Results   Component Value Date/Time    LABA1C 4.4 10/28/2023 10:33 AM     TSH: No results found for: \"TSH\"  Troponin: No results found for: \"TROPONINT\"  Lactic Acid: No results for input(s): \"LACTA\" in the last 72 hours. BNP:   Recent Labs     11/07/23  0522   PROBNP 55,702*     UA:  Lab Results   Component Value Date/Time    NITRU Negative 09/28/2023 05:17 AM    COLORU DARK YELLOW 09/28/2023 05:17 AM    PHUR 5.0 09/28/2023 05:17 AM    WBCUA 1403 09/28/2023 05:17 AM    RBCUA 83 09/28/2023 05:17 AM    BACTERIA Rare 09/28/2023 05:17 AM    CLARITYU TURBID 09/28/2023 05:17 AM    SPECGRAV 1.020 09/28/2023 05:17 AM    LEUKOCYTESUR LARGE 09/28/2023 05:17 AM    UROBILINOGEN 1.0 09/28/2023 05:17 AM    BILIRUBINUR MODERATE 09/28/2023 05:17 AM    BLOODU MODERATE 09/28/2023 05:17 AM    GLUCOSEU Negative 09/28/2023 05:17 AM    KETUA TRACE 09/28/2023 05:17 AM     Urine Cultures:   Lab Results   Component Value Date/Time    LABURIN No growth at 18 to 36 hours 09/28/2023 05:11 AM     Blood Cultures:   Lab Results   Component Value Date/Time    BC No Growth after 4 days of incubation. 09/28/2023 10:23 AM     Lab Results   Component Value Date/Time    BLOODCULT2 No Growth after 4 days of incubation.  09/28/2023 10:31 AM     Organism: No results found for: \"ORG\"    Time Spent Discharging patient 35 minutes    Electronically signed by Silvina Raphael MD on 11/9/2023 at 2:24 PM

## 2023-11-09 NOTE — PROGRESS NOTES
[facility] Department   Phone: [phone]    Occupational Therapy    [x] Initial Evaluation            [] Daily Treatment Note         [] Discharge Summary      Patient: Tian Puentes   : 1955   MRN: [id number]   Date of Service:  2023    Admitting Diagnosis:  Volume overload  Current Admission Summary:   Past Medical History:  has a past medical history of Anemia, Atrial septal defect within oval fossa, CHB (complete heart block) (HCC), Chronic diastolic HF (heart failure) (HCC), Chronic left-sided low back pain, Chronic respiratory failure with hypoxia ([address]), Diabetic foot infection ([address]), DM2 (diabetes mellitus, type 2) ([address]), ESRD (end stage renal disease) ([address]), HLD (hyperlipidemia), HTN (hypertension), Hypothyroidism, MILE on CPAP, PAF (paroxysmal atrial fibrillation) ([address]), and Polyneuropathy. Past Surgical History:  has a past surgical history that includes Pacemaker insertion. Discharge Recommendations: Tian Puentes is a 70 y.o. female with a pmh of left PE on Eliquis, ESRD on hemodialysis , DM 2 with neuropathy, hypertension, hypothyroid, chronic diastolic CHF, history of left BKA, who presents with right sided upper back pain. Found to have elevated troponin above baseline, and Volume overload.     DME Required For Discharge: DME to be determined at next level of care, DME to be determined pending patient progress    Precautions/Restrictions: high fall risk, contact precautions  Weight Bearing Restrictions: no restrictions  [] Right Upper Extremity  [] Left Upper Extremity [] Right Lower Extremity  [] Left Lower Extremity     Required Braces/Orthotics:  (L) LE prothesis   [] Right  [x] Left  Positional Restrictions:no positional restrictions    Pre-Admission Information   Lives With:  facility [facility]  - has been on skilled side - amputation 2023              Type of Home: long term care facility  Home Layout: Following:   [facility]     Education  Barriers To Learning: none  Patient Education: patient educated on goals, OT role and benefits, plan of care, ADL adaptive strategies, energy conservation, disease specific education, pressure relief, transfer training, discharge recommendations  Learning Assessment:  patient verbalizes and demonstrates understanding    Assessment  Activity Tolerance: Tolerated well  Impairments Requiring Therapeutic Intervention: decreased functional mobility, decreased ADL status, decreased strength, decreased safety awareness, decreased endurance, decreased balance, decreased IADL, increased pain  Prognosis: good  Clinical Assessment: Pt presents with the above deficits which are below baseline secondary to symptoms associated with volume overload. Pt currently requiring min A-max A to perform sit <> stands depending on surface height. Pt requiring max-total A to perform LB dressing and toileting tasks. Pt would continue to benefit from skilled OT services in order to maximize safety to return to [facility]. Safety Interventions: patient left in bed, bed alarm in place, call light within reach, gait belt, patient at risk for falls, and nurse notified    Plan  Frequency: 3-5 x/per week  Current Treatment Recommendations: strengthening, balance training, transfer training, endurance training, ADL/self-care training, home exercise program, safety education, equipment evaluation/education, and positioning    Goals  Patient Goals: to go back to rehab and eventually move into handicap apartment   Short Term Goals:  Time Frame: to be met by discharge  Patient will complete lower body ADL at minimal assistance   Patient will complete functional transfers at contact guard assistance   Patient will complete functional mobility at stand by assistance   Patient will increase functional standing balance to 10 minutes at SBA for improved ADL completion    Above goals reviewed on 11/9/2023.   All goals are ongoing at

## 2023-11-09 NOTE — PROGRESS NOTES
Patient discharging back to [facility] today. EMS here to transport. IV removed without complication and clean, dry dressing applied. Patient tolerated well. Report given to EMS.

## 2023-11-09 NOTE — PLAN OF CARE
Problem: Safety - Adult  Goal: Free from fall injury  11/9/2023 0046 by Lyndle Schlatter, RN  Outcome: Progressing  11/8/2023 1832 by Mylene Da Silva RN  Outcome: Progressing     Problem: Chronic Conditions and Co-morbidities  Goal: Patient's chronic conditions and co-morbidity symptoms are monitored and maintained or improved  11/9/2023 0046 by Lyndle Schlatter, RN  Outcome: Progressing  Flowsheets (Taken 11/8/2023 1945)  Care Plan - Patient's Chronic Conditions and Co-Morbidity Symptoms are Monitored and Maintained or Improved: Monitor and assess patient's chronic conditions and comorbid symptoms for stability, deterioration, or improvement  11/8/2023 1832 by Mylene Da Silva RN  Outcome: Progressing     Problem: Pain  Goal: Verbalizes/displays adequate comfort level or baseline comfort level  11/9/2023 0046 by Lyndle Schlatter, RN  Outcome: Progressing  11/8/2023 1832 by Mylene Da Silva RN  Outcome: Progressing     Problem: Skin/Tissue Integrity  Goal: Absence of new skin breakdown  Description: 1. Monitor for areas of redness and/or skin breakdown  2. Assess vascular access sites hourly  3. Every 4-6 hours minimum:  Change oxygen saturation probe site  4. Every 4-6 hours:  If on nasal continuous positive airway pressure, respiratory therapy assess nares and determine need for appliance change or resting period.   11/9/2023 0046 by Lyndle Schlatter, RN  Outcome: Progressing  11/8/2023 1832 by Mylene Da Silva RN  Outcome: Progressing normal...

## 2023-11-09 NOTE — CARE COORDINATION
Discharge Plan:  Patient discharge to:    [facility] of [address] [place], [address]  Phone: [phone]  Fax: [phone]    SW faxed [address] and [address] to [phone]    LEO Euceda to call report to  transport with [address], 5:30pm  time     [facility] admissions staff, Nani Garibay ([phone]), advised of discharge and in agreement. Family advised of discharge and in agreement. HENS not needed for return to facility. SW intervention complete.         Adonay GRIFFIN, MAXINE, [facility] -   [phone]    Electronically signed by GABY Yusuf on 11/9/2023 at 4:46 PM

## 2023-11-09 NOTE — PROGRESS NOTES
MD Andrés Pino MD Richelle George, MD                                  Office: [phone]                 Fax: [phone]          [facility]                    NEPHROLOGY  PROGRESS NOTE:     PATIENT NAME: Bruce Spivey  : 1955  MRN: [id number]        Indication for Dialysis  ESRD    Patient tolerating dialysis well. Less shortness of breath. At dry weight. Renal stable. Tolerating treatment  Fairly well  Vitals:    23 0945   BP: 138/81   Pulse: 81   Resp:    Temp:    SpO2:        Neck. JVD visible  Cardiac No pericardial rub. Flow murmur. Chest: Bilateral Rales.  No rhonchi  Ext :  Edema mild    Labs Reviewed  by me   Labs   Lab Results   Component Value Date    CREATININE 2.7 (H) 2023    BUN 14 2023     2023    K 4.4 2023     2023    CO2 28 2023     Lab Results   Component Value Date    WBC 4.6 2023    HGB 8.2 (L) 2023    HCT 26.8 (L) 2023    .1 (H) 2023     (L) 2023

## 2023-11-09 NOTE — PROGRESS NOTES
assistance  Comments:  Gait belt donned. Patient assisted in donning prosthatic.  RW used for transfer. MIN assist from very elevated bed height  Ambulation:  Surface:level surface  Assistive Device: rolling walker  Assistance: contact guard assistance  Distance: 15'  Gait Mechanics: dec'd miguel, dec'd step length, dec'd step height, step-to pattern, increased time to complete, SOB noted, frequent standing rest breaks  Comments:    Stair Mobility:  Stair mobility not completed on this date. Comments:  Wheelchair Mobility:  No w/c mobility completed on this date. Comments:  Balance:  Static Sitting Balance: good: independent with functional balance in unsupported position  Dynamic Sitting Balance: fair (-): maintains balance at SBA with use of UE support  Static Standing Balance: fair (-): maintains balance at CGA with use of UE support  Dynamic Standing Balance: fair (-): maintains balance at CGA with use of UE support  Comments:    Other Therapeutic Interventions    Functional Outcomes  AM-PAC Inpatient Mobility Raw Score : 14              Cognition  WFL  Orientation:    alert and oriented x 4  Command Following:   [facility]    Education  Barriers To Learning: none  Patient Education: patient educated on goals, PT role and benefits, plan of care, weight-bearing education, general safety, functional mobility training, proper use of assistive device/equipment, transfer training, discharge recommendations  Learning Assessment:  patient verbalizes and demonstrates understanding    Assessment  Activity Tolerance: Limited by muscle fatigue, endurance deficits, balance impairments. Impairments Requiring Therapeutic Intervention: decreased functional mobility, decreased ADL status, decreased strength, decreased endurance, decreased balance  Prognosis: good  Clinical Assessment: Patient reports she was ambulatory at SNF, continuing to work with therapy with prosthetic following ALIYAH MURILLO in February 2023.   She presently requires assistance with transfers and ambulation using RW and would benefit from continued therapy at d/c. Safety Interventions: patient left in bed, bed alarm in place, call light within reach, gait belt, patient at risk for falls, and nurse notified    Plan  Frequency: 3-5 x/per week  Current Treatment Recommendations: strengthening, balance training, functional mobility training, transfer training, gait training, endurance training, patient/caregiver education, safety education, and equipment evaluation/education    Goals  Patient Goals: Walk on my own. Short Term Goals:  Time Frame: Discharge  Patient will complete bed mobility at modified independent   Patient will complete transfers at contact guard assistance   Patient will ambulate 50 ft with use of rolling walker at contact guard assistance    Above goals reviewed on 11/9/2023. All goals are ongoing at this time unless indicated above.       Therapy Session Time      Individual Group Co-treatment   Time In     1402   Time Out     1440   Minutes     38     Timed Code Treatment Minutes:  23 Minutes  Total Treatment Minutes:  38       Electronically Signed By: Bethany Clarke PT, DPT, [id number] [id number]

## 2023-11-09 NOTE — CARE COORDINATION
11/09/23 0915   Readmission Assessment   Number of Days since last admission? 8-30 days   Previous Disposition Long Term Care  (Patient is a Long-Term Care resident at [facility].)   Who is being Kristie Simmons  ([facility], Nani Garibay ([phone]). )   What was the patient's/caregiver's perception as to why they think they needed to return back to the hospital? Did not realize care needs would be so extensive   Did you visit your Primary Care Physician after you left the hospital, before you returned this time? Yes  (Physician/Nurse Practitioner see patient's within 72 hours of return to facility.)   Did you see a specialist, such as Cardiac, Pulmonary, Orthopedic Physician, etc. after you left the hospital? No   Who advised the patient to return to the hospital? Other (Comment)  [place] [facility] Staff)   Does the patient report anything that got in the way of taking their medications? No   In our efforts to provide the best possible care to you and others like you, can you think of anything that we could have done to help you after you left the hospital the first time, so that you might not have needed to return so soon?  Other (Comment)  (Admissions staff denied.)     Electronically signed by GABY Yusuf on 11/9/2023 at 9:21 AM

## 2023-11-09 NOTE — CARE COORDINATION
Case Management Assessment  Initial Evaluation    Date/Time of Evaluation: 11/9/2023 11:31 AM  Assessment Completed by: GABY Mccabe    If patient is discharged prior to next notation, then this note serves as note for discharge by case management. Patient Name: Alessandra Fernandez                   YOB: 1955  Diagnosis: Acute pulmonary edema ([address]) [J81.0]  ESRD on dialysis ([address]) [N18.6, Z99.2]  Volume overload [E87.70]  Upper back pain on right side [M54.9]                   Date / Time: 11/6/2023  6:35 PM    Patient Admission Status: Inpatient   Readmission Risk (Low < 19, Mod (19-27), High > 27): Readmission Risk Score: 29.6    Current PCP: Sharon Zhao MD  PCP verified by CM? Yes ([facility] Physician/Nurse Practitoner)    Chart Reviewed: Yes      History Provided by: Other (see comment) (Completed via telephone with [facility] admissions staff, Shayla Falcon ([phone]). )  Patient Orientation: Other (see comment) (Completed via telephone with [facility] admissions staff, Shayla Falcon ([phone]). )    Patient Cognition: Other (see comment) (Completed via telephone with [facility] admissions staff, Shayla Falcon ([phone]). )    Hospitalization in the last 30 days (Readmission):  Yes    If yes, Readmission Assessment in  Navigator will be completed. Advance Directives:      Code Status: Full Code   Patient's Primary Decision Maker is: Legal Next of Kin      Discharge Planning:    Patient expects to discharge to: Long-term care  Plan for transportation at discharge:  Other (see comment) ([facility] admissions staff)    Financial    Payor: [employer]AGE / Plan: [employer]MIUM PPO / Product Type: *No Product type* /         Current Nursing Home Information:  Patient admitted from:    [facility] of [address] [place], [address]  Phone: [phone]  Fax: [phone]      Call to [facility]

## 2023-12-06 ENCOUNTER — APPOINTMENT (OUTPATIENT)
Dept: GENERAL RADIOLOGY | Age: 68
DRG: 177 | End: 2023-12-06
Payer: MEDICARE

## 2023-12-06 ENCOUNTER — HOSPITAL ENCOUNTER (INPATIENT)
Age: 68
LOS: 6 days | Discharge: NURSING, REHAB OR LONG-TERM CARE | DRG: 177 | End: 2023-12-12
Attending: INTERNAL MEDICINE | Admitting: INTERNAL MEDICINE
Payer: MEDICARE

## 2023-12-06 DIAGNOSIS — N18.6 END STAGE RENAL DISEASE ON DIALYSIS (HCC): Primary | ICD-10-CM

## 2023-12-06 DIAGNOSIS — U07.1 COVID-19: ICD-10-CM

## 2023-12-06 DIAGNOSIS — J81.0 ACUTE PULMONARY EDEMA (HCC): ICD-10-CM

## 2023-12-06 DIAGNOSIS — Z99.2 END STAGE RENAL DISEASE ON DIALYSIS (HCC): Primary | ICD-10-CM

## 2023-12-06 PROBLEM — E87.5 HYPERKALEMIA: Status: ACTIVE | Noted: 2023-12-06

## 2023-12-06 PROBLEM — J12.82 PNEUMONIA DUE TO COVID-19 VIRUS: Status: ACTIVE | Noted: 2023-12-06

## 2023-12-06 LAB
ALBUMIN SERPL-MCNC: 3.6 G/DL (ref 3.4–5)
ALBUMIN/GLOB SERPL: 1.3 {RATIO} (ref 1.1–2.2)
ALP SERPL-CCNC: 188 U/L (ref 40–129)
ALT SERPL-CCNC: 10 U/L (ref 10–40)
ANION GAP SERPL CALCULATED.3IONS-SCNC: 19 MMOL/L (ref 3–16)
AST SERPL-CCNC: 16 U/L (ref 15–37)
BASOPHILS # BLD: 0 K/UL (ref 0–0.2)
BASOPHILS NFR BLD: 0.2 %
BILIRUB SERPL-MCNC: 1 MG/DL (ref 0–1)
BUN SERPL-MCNC: 69 MG/DL (ref 7–20)
CALCIUM SERPL-MCNC: 9.2 MG/DL (ref 8.3–10.6)
CHLORIDE SERPL-SCNC: 93 MMOL/L (ref 99–110)
CO2 SERPL-SCNC: 24 MMOL/L (ref 21–32)
CREAT SERPL-MCNC: 7.2 MG/DL (ref 0.6–1.2)
DEPRECATED RDW RBC AUTO: 17.3 % (ref 12.4–15.4)
EKG ATRIAL RATE: 85 BPM
EKG DIAGNOSIS: NORMAL
EKG Q-T INTERVAL: 464 MS
EKG QRS DURATION: 180 MS
EKG QTC CALCULATION (BAZETT): 515 MS
EKG R AXIS: -74 DEGREES
EKG T AXIS: 97 DEGREES
EKG VENTRICULAR RATE: 74 BPM
EOSINOPHIL # BLD: 0 K/UL (ref 0–0.6)
EOSINOPHIL NFR BLD: 0.3 %
FLUAV RNA RESP QL NAA+PROBE: NOT DETECTED
FLUBV RNA RESP QL NAA+PROBE: NOT DETECTED
GFR SERPLBLD CREATININE-BSD FMLA CKD-EPI: 6 ML/MIN/{1.73_M2}
GLUCOSE BLD-MCNC: 117 MG/DL (ref 70–99)
GLUCOSE BLD-MCNC: 77 MG/DL (ref 70–99)
GLUCOSE BLD-MCNC: 80 MG/DL (ref 70–99)
GLUCOSE SERPL-MCNC: 91 MG/DL (ref 70–99)
HCT VFR BLD AUTO: 30.1 % (ref 36–48)
HGB BLD-MCNC: 9.6 G/DL (ref 12–16)
LACTATE BLDV-SCNC: 1.3 MMOL/L (ref 0.4–1.9)
LYMPHOCYTES # BLD: 0.5 K/UL (ref 1–5.1)
LYMPHOCYTES NFR BLD: 6.1 %
MCH RBC QN AUTO: 32.1 PG (ref 26–34)
MCHC RBC AUTO-ENTMCNC: 32 G/DL (ref 31–36)
MCV RBC AUTO: 100.5 FL (ref 80–100)
MONOCYTES # BLD: 0.4 K/UL (ref 0–1.3)
MONOCYTES NFR BLD: 4.5 %
NEUTROPHILS # BLD: 7.8 K/UL (ref 1.7–7.7)
NEUTROPHILS NFR BLD: 88.9 %
NT-PROBNP SERPL-MCNC: ABNORMAL PG/ML (ref 0–124)
PERFORMED ON: ABNORMAL
PERFORMED ON: NORMAL
PERFORMED ON: NORMAL
PLATELET # BLD AUTO: 104 K/UL (ref 135–450)
PMV BLD AUTO: 7.7 FL (ref 5–10.5)
POTASSIUM SERPL-SCNC: 5.3 MMOL/L (ref 3.5–5.1)
PROT SERPL-MCNC: 6.3 G/DL (ref 6.4–8.2)
RBC # BLD AUTO: 2.99 M/UL (ref 4–5.2)
SARS-COV-2 RNA RESP QL NAA+PROBE: DETECTED
SODIUM SERPL-SCNC: 136 MMOL/L (ref 136–145)
TROPONIN, HIGH SENSITIVITY: 274 NG/L (ref 0–14)
TROPONIN, HIGH SENSITIVITY: 278 NG/L (ref 0–14)
WBC # BLD AUTO: 8.8 K/UL (ref 4–11)

## 2023-12-06 PROCEDURE — 36415 COLL VENOUS BLD VENIPUNCTURE: CPT

## 2023-12-06 PROCEDURE — 84484 ASSAY OF TROPONIN QUANT: CPT

## 2023-12-06 PROCEDURE — 83605 ASSAY OF LACTIC ACID: CPT

## 2023-12-06 PROCEDURE — 93010 ELECTROCARDIOGRAM REPORT: CPT | Performed by: INTERNAL MEDICINE

## 2023-12-06 PROCEDURE — 36556 INSERT NON-TUNNEL CV CATH: CPT

## 2023-12-06 PROCEDURE — 2580000003 HC RX 258: Performed by: INTERNAL MEDICINE

## 2023-12-06 PROCEDURE — 80053 COMPREHEN METABOLIC PANEL: CPT

## 2023-12-06 PROCEDURE — 83880 ASSAY OF NATRIURETIC PEPTIDE: CPT

## 2023-12-06 PROCEDURE — 5A1D80Z PERFORMANCE OF URINARY FILTRATION, PROLONGED INTERMITTENT, 6-18 HOURS PER DAY: ICD-10-PCS | Performed by: INTERNAL MEDICINE

## 2023-12-06 PROCEDURE — 87636 SARSCOV2 & INF A&B AMP PRB: CPT

## 2023-12-06 PROCEDURE — 1200000000 HC SEMI PRIVATE

## 2023-12-06 PROCEDURE — 99285 EMERGENCY DEPT VISIT HI MDM: CPT

## 2023-12-06 PROCEDURE — 90935 HEMODIALYSIS ONE EVALUATION: CPT

## 2023-12-06 PROCEDURE — 6360000002 HC RX W HCPCS: Performed by: INTERNAL MEDICINE

## 2023-12-06 PROCEDURE — 71045 X-RAY EXAM CHEST 1 VIEW: CPT

## 2023-12-06 PROCEDURE — 2060000000 HC ICU INTERMEDIATE R&B

## 2023-12-06 PROCEDURE — 93005 ELECTROCARDIOGRAM TRACING: CPT | Performed by: INTERNAL MEDICINE

## 2023-12-06 PROCEDURE — 85025 COMPLETE CBC W/AUTO DIFF WBC: CPT

## 2023-12-06 PROCEDURE — 83036 HEMOGLOBIN GLYCOSYLATED A1C: CPT

## 2023-12-06 RX ORDER — ONDANSETRON 2 MG/ML
4 INJECTION INTRAMUSCULAR; INTRAVENOUS EVERY 6 HOURS PRN
Status: DISCONTINUED | OUTPATIENT
Start: 2023-12-06 | End: 2023-12-12 | Stop reason: HOSPADM

## 2023-12-06 RX ORDER — GABAPENTIN 100 MG/1
100 CAPSULE ORAL
Status: DISCONTINUED | OUTPATIENT
Start: 2023-12-06 | End: 2023-12-12 | Stop reason: HOSPADM

## 2023-12-06 RX ORDER — GLUCAGON 1 MG/ML
1 KIT INJECTION PRN
Status: DISCONTINUED | OUTPATIENT
Start: 2023-12-06 | End: 2023-12-06 | Stop reason: RX

## 2023-12-06 RX ORDER — PANTOPRAZOLE SODIUM 40 MG/1
40 TABLET, DELAYED RELEASE ORAL
Status: DISCONTINUED | OUTPATIENT
Start: 2023-12-07 | End: 2023-12-12 | Stop reason: HOSPADM

## 2023-12-06 RX ORDER — HYDROXYZINE HYDROCHLORIDE 25 MG/1
25 TABLET, FILM COATED ORAL 3 TIMES DAILY PRN
Status: DISCONTINUED | OUTPATIENT
Start: 2023-12-06 | End: 2023-12-12 | Stop reason: HOSPADM

## 2023-12-06 RX ORDER — 0.9 % SODIUM CHLORIDE 0.9 %
500 INTRAVENOUS SOLUTION INTRAVENOUS PRN
Status: DISCONTINUED | OUTPATIENT
Start: 2023-12-06 | End: 2023-12-12 | Stop reason: HOSPADM

## 2023-12-06 RX ORDER — POTASSIUM CHLORIDE 7.45 MG/ML
10 INJECTION INTRAVENOUS PRN
Status: DISCONTINUED | OUTPATIENT
Start: 2023-12-06 | End: 2023-12-06

## 2023-12-06 RX ORDER — BUDESONIDE AND FORMOTEROL FUMARATE DIHYDRATE 80; 4.5 UG/1; UG/1
2 AEROSOL RESPIRATORY (INHALATION) 2 TIMES DAILY
Status: DISCONTINUED | OUTPATIENT
Start: 2023-12-06 | End: 2023-12-06 | Stop reason: SDUPTHER

## 2023-12-06 RX ORDER — BISACODYL 10 MG
10 SUPPOSITORY, RECTAL RECTAL DAILY PRN
Status: DISCONTINUED | OUTPATIENT
Start: 2023-12-06 | End: 2023-12-12 | Stop reason: HOSPADM

## 2023-12-06 RX ORDER — LACTULOSE 10 G/15ML
10 SOLUTION ORAL
Status: DISCONTINUED | OUTPATIENT
Start: 2023-12-07 | End: 2023-12-12 | Stop reason: HOSPADM

## 2023-12-06 RX ORDER — OXYCODONE HYDROCHLORIDE AND ACETAMINOPHEN 5; 325 MG/1; MG/1
1 TABLET ORAL 2 TIMES DAILY
Status: DISCONTINUED | OUTPATIENT
Start: 2023-12-06 | End: 2023-12-12 | Stop reason: HOSPADM

## 2023-12-06 RX ORDER — LEVOTHYROXINE SODIUM 0.1 MG/1
100 TABLET ORAL
Status: DISCONTINUED | OUTPATIENT
Start: 2023-12-07 | End: 2023-12-12 | Stop reason: HOSPADM

## 2023-12-06 RX ORDER — ACETAMINOPHEN 325 MG/1
650 TABLET ORAL EVERY 6 HOURS PRN
Status: DISCONTINUED | OUTPATIENT
Start: 2023-12-06 | End: 2023-12-06 | Stop reason: SDUPTHER

## 2023-12-06 RX ORDER — SENNA AND DOCUSATE SODIUM 50; 8.6 MG/1; MG/1
1 TABLET, FILM COATED ORAL
Status: DISCONTINUED | OUTPATIENT
Start: 2023-12-08 | End: 2023-12-12 | Stop reason: HOSPADM

## 2023-12-06 RX ORDER — HYDROCORTISONE ACETATE 25 MG/1
25 SUPPOSITORY RECTAL DAILY PRN
Status: DISCONTINUED | OUTPATIENT
Start: 2023-12-06 | End: 2023-12-12 | Stop reason: HOSPADM

## 2023-12-06 RX ORDER — INSULIN LISPRO 100 [IU]/ML
0-4 INJECTION, SOLUTION INTRAVENOUS; SUBCUTANEOUS NIGHTLY
Status: DISCONTINUED | OUTPATIENT
Start: 2023-12-06 | End: 2023-12-12 | Stop reason: HOSPADM

## 2023-12-06 RX ORDER — INSULIN LISPRO 100 [IU]/ML
0-8 INJECTION, SOLUTION INTRAVENOUS; SUBCUTANEOUS
Status: DISCONTINUED | OUTPATIENT
Start: 2023-12-07 | End: 2023-12-12 | Stop reason: HOSPADM

## 2023-12-06 RX ORDER — ACETAMINOPHEN 325 MG/1
650 TABLET ORAL EVERY 6 HOURS PRN
Status: DISCONTINUED | OUTPATIENT
Start: 2023-12-06 | End: 2023-12-12 | Stop reason: HOSPADM

## 2023-12-06 RX ORDER — DEXTROSE MONOHYDRATE 100 MG/ML
INJECTION, SOLUTION INTRAVENOUS CONTINUOUS PRN
Status: DISCONTINUED | OUTPATIENT
Start: 2023-12-06 | End: 2023-12-12 | Stop reason: HOSPADM

## 2023-12-06 RX ORDER — HEPARIN SODIUM 5000 [USP'U]/ML
5000 INJECTION, SOLUTION INTRAVENOUS; SUBCUTANEOUS EVERY 8 HOURS SCHEDULED
Status: DISCONTINUED | OUTPATIENT
Start: 2023-12-06 | End: 2023-12-06 | Stop reason: ALTCHOICE

## 2023-12-06 RX ORDER — FERROUS SULFATE 325(65) MG
325 TABLET ORAL EVERY OTHER DAY
Status: DISCONTINUED | OUTPATIENT
Start: 2023-12-08 | End: 2023-12-12 | Stop reason: HOSPADM

## 2023-12-06 RX ORDER — ALLOPURINOL 300 MG/1
300 TABLET ORAL DAILY
Status: DISCONTINUED | OUTPATIENT
Start: 2023-12-07 | End: 2023-12-12 | Stop reason: HOSPADM

## 2023-12-06 RX ORDER — SODIUM CHLORIDE 9 MG/ML
INJECTION, SOLUTION INTRAVENOUS PRN
Status: DISCONTINUED | OUTPATIENT
Start: 2023-12-06 | End: 2023-12-12 | Stop reason: HOSPADM

## 2023-12-06 RX ORDER — POTASSIUM CHLORIDE 20 MEQ/1
40 TABLET, EXTENDED RELEASE ORAL PRN
Status: DISCONTINUED | OUTPATIENT
Start: 2023-12-06 | End: 2023-12-06

## 2023-12-06 RX ORDER — ALBUTEROL SULFATE 2.5 MG/3ML
2.5 SOLUTION RESPIRATORY (INHALATION)
Status: DISCONTINUED | OUTPATIENT
Start: 2023-12-06 | End: 2023-12-07

## 2023-12-06 RX ORDER — HYDRALAZINE HYDROCHLORIDE 20 MG/ML
10 INJECTION INTRAMUSCULAR; INTRAVENOUS EVERY 6 HOURS PRN
Status: DISCONTINUED | OUTPATIENT
Start: 2023-12-06 | End: 2023-12-12 | Stop reason: HOSPADM

## 2023-12-06 RX ORDER — ACETAMINOPHEN 650 MG/1
650 SUPPOSITORY RECTAL EVERY 6 HOURS PRN
Status: DISCONTINUED | OUTPATIENT
Start: 2023-12-06 | End: 2023-12-12 | Stop reason: HOSPADM

## 2023-12-06 RX ORDER — FUROSEMIDE 40 MG/1
80 TABLET ORAL DAILY
Status: DISCONTINUED | OUTPATIENT
Start: 2023-12-07 | End: 2023-12-12 | Stop reason: HOSPADM

## 2023-12-06 RX ORDER — SODIUM CHLORIDE 0.9 % (FLUSH) 0.9 %
10 SYRINGE (ML) INJECTION PRN
Status: DISCONTINUED | OUTPATIENT
Start: 2023-12-06 | End: 2023-12-12 | Stop reason: HOSPADM

## 2023-12-06 RX ORDER — CETIRIZINE HYDROCHLORIDE 10 MG/1
5 TABLET ORAL NIGHTLY
Status: DISCONTINUED | OUTPATIENT
Start: 2023-12-06 | End: 2023-12-12 | Stop reason: HOSPADM

## 2023-12-06 RX ORDER — SODIUM CHLORIDE 0.9 % (FLUSH) 0.9 %
10 SYRINGE (ML) INJECTION EVERY 12 HOURS SCHEDULED
Status: DISCONTINUED | OUTPATIENT
Start: 2023-12-06 | End: 2023-12-12 | Stop reason: HOSPADM

## 2023-12-06 RX ORDER — ONDANSETRON 4 MG/1
4 TABLET, ORALLY DISINTEGRATING ORAL EVERY 8 HOURS PRN
Status: DISCONTINUED | OUTPATIENT
Start: 2023-12-06 | End: 2023-12-12 | Stop reason: HOSPADM

## 2023-12-06 RX ORDER — METHOCARBAMOL 500 MG/1
500 TABLET, FILM COATED ORAL EVERY 12 HOURS
Status: DISCONTINUED | OUTPATIENT
Start: 2023-12-06 | End: 2023-12-12 | Stop reason: HOSPADM

## 2023-12-06 RX ORDER — HEPARIN SODIUM 1000 [USP'U]/ML
3700 INJECTION, SOLUTION INTRAVENOUS; SUBCUTANEOUS PRN
Status: DISCONTINUED | OUTPATIENT
Start: 2023-12-06 | End: 2023-12-12 | Stop reason: HOSPADM

## 2023-12-06 RX ORDER — LANOLIN ALCOHOL/MO/W.PET/CERES
3 CREAM (GRAM) TOPICAL NIGHTLY PRN
Status: DISCONTINUED | OUTPATIENT
Start: 2023-12-06 | End: 2023-12-12 | Stop reason: HOSPADM

## 2023-12-06 RX ORDER — DEXAMETHASONE SODIUM PHOSPHATE 10 MG/ML
6 INJECTION, SOLUTION INTRAMUSCULAR; INTRAVENOUS EVERY 24 HOURS
Status: DISCONTINUED | OUTPATIENT
Start: 2023-12-06 | End: 2023-12-12 | Stop reason: HOSPADM

## 2023-12-06 RX ADMIN — Medication 10 ML: at 21:33

## 2023-12-06 RX ADMIN — DEXAMETHASONE SODIUM PHOSPHATE 6 MG: 10 INJECTION, SOLUTION INTRAMUSCULAR; INTRAVENOUS at 21:31

## 2023-12-06 ASSESSMENT — PAIN - FUNCTIONAL ASSESSMENT: PAIN_FUNCTIONAL_ASSESSMENT: 0-10

## 2023-12-06 ASSESSMENT — PAIN DESCRIPTION - LOCATION
LOCATION: BUTTOCKS
LOCATION: BUTTOCKS;BACK

## 2023-12-06 ASSESSMENT — LIFESTYLE VARIABLES: HOW OFTEN DO YOU HAVE A DRINK CONTAINING ALCOHOL: NEVER

## 2023-12-06 ASSESSMENT — PAIN DESCRIPTION - DESCRIPTORS: DESCRIPTORS: ACHING

## 2023-12-06 ASSESSMENT — PAIN SCALES - GENERAL
PAINLEVEL_OUTOF10: 8
PAINLEVEL_OUTOF10: 10

## 2023-12-06 NOTE — ED NOTES
ED TO INPATIENT SBAR HANDOFF    Patient Name: Marilynn Garzon   :  1955  70 y.o. MRN:  [id number]  Preferred Name  Selena Nichols  ED Room #:  ED-0007/07  Family/Caregiver Present no   Restraints no   Sitter no   Sepsis Risk Score Sepsis Risk Score: 3.25    Situation  Code Status: FULL CODE  No additional code details. Allergies: Patient has no known allergies. Weight: Patient Vitals for the past 96 hrs (Last 3 readings):   Weight   23 1120 106.6 kg (235 lb)     Arrived from: nursing home  Chief Complaint:   Chief Complaint   Patient presents with    Positive For Covid-19     Arrived via [facility] d/t SOB d/t Covid +; tested + on Friday; has missed dialysis Monday and today; last day was Friday; pt reported missed days were due to facility messing up transportation      Hospital Problem/Diagnosis:  Principal Problem:    COVID  Active Problems:    ESRD on hemodialysis ([address])    HTN (hypertension)    COPD (chronic obstructive pulmonary disease) ([address])    Pneumonia due to COVID-19 virus  Resolved Problems:    * No resolved hospital problems. *    Imaging:   XR CHEST PORTABLE   Final Result   Pulmonary vascular congestion and stable cardiomegaly.            Abnormal labs:   Abnormal Labs Reviewed   COVID-19 & INFLUENZA COMBO - Abnormal; Notable for the following components:       Result Value    SARS-CoV-2 RNA, RT PCR DETECTED (*)     All other components within normal limits   CBC WITH AUTO DIFFERENTIAL - Abnormal; Notable for the following components:    RBC 2.99 (*)     Hemoglobin 9.6 (*)     Hematocrit 30.1 (*)     .5 (*)     RDW 17.3 (*)     Platelets 672 (*)     Neutrophils Absolute 7.8 (*)     Lymphocytes Absolute 0.5 (*)     All other components within normal limits   COMPREHENSIVE METABOLIC PANEL W/ REFLEX TO MG FOR LOW K - Abnormal; Notable for the following components:    Potassium reflex Magnesium 5.3 (*)     Chloride 93 (*)     Anion Gap 19 (*)     BUN 69 (*)     Creatinine 7.2 (*)     Est,

## 2023-12-06 NOTE — ED PROVIDER NOTES
EKG:  Read by me in the absence of a cardiologist shows:   Ventricular paced rhythm, axis left, conduction delay from pacing, associated repolarization abnormalities, no major change from prior study     I did not perform face-to-face evaluation and was not otherwise involved in this patient's care. Please see PA/NP note for further information on this visit.        Prme Jiménez MD  12/06/23 2224

## 2023-12-06 NOTE — H&P
History and Physical  Dr. Barrera Christopher  12/6/2023    PCP: Veronique Shoemaker MD    Cc:   Chief Complaint   Patient presents with    Positive For Covid-19     Arrived via [facility] d/t SOB d/t Covid +; tested + on Friday; has missed dialysis Monday and today; last day was Friday; pt reported missed days were due to facility messing up transportation        HPI:  Mark Ni is a 70 y.o. female who has a past medical history of Anemia, Atrial septal defect within oval fossa, CHB (complete heart block) ([address]), Chronic diastolic HF (heart failure) ([address]), Chronic left-sided low back pain, Chronic respiratory failure with hypoxia ([address]), Diabetic foot infection ([address]), DM2 (diabetes mellitus, type 2) ([address]), ESRD (end stage renal disease) ([address]), HLD (hyperlipidemia), HTN (hypertension), Hypothyroidism, MILE on CPAP, PAF (paroxysmal atrial fibrillation) ([address]), and Polyneuropathy. Patient presents with COVID. HPI  (1-3 for expanded problem focused, ?4 for detailed/comprehensive)     70 y.o. female who presents to the emergency department with a chief complaint of shortness of breath, chest congestion, fatigue for the past 3 to 5 days. She states she was COVID-positive. Typically gets dialysis on Monday, Wednesday, Friday but missed today and 2 days ago she states due to transportation issues. Here in ER, creat and K are both elevated. Trop also elevated, but this is chronic and likely 2/2 esrd    Currently requiring 4L oxygen in ER         Problem list of hospitalization thus far: Active Hospital Problems    Diagnosis     COVID [U07.1]     Pneumonia due to COVID-19 virus [U07.1, J12.82]     Hyperkalemia [E87.5]     COPD (chronic obstructive pulmonary disease) (HCC) [J44.9]     ESRD on hemodialysis ([address]) [N18.6, Z99.2]     HTN (hypertension) [I10]          Review of Systems: (1 system for EPF, 2-9 for detailed, 10+ for comprehensive)  Constitutional: Negative for chills and fever.    Positive for fatigue  HENT: Negative for dental Medical Necessity Information: It is in your best interest to select a reason for this procedure from the list below. All of these items fulfill various CMS LCD requirements except the new and changing color options. Add 52 Modifier (Optional): no Post-Care Instructions: I reviewed with the patient in detail post-care instructions. Patient is to wear sunprotection, and avoid picking at any of the treated lesions. Pt may apply Vaseline to crusted or scabbing areas. Bill Insurance (You Assume Risk Of Denial Or Audit By Selecting Yes): Yes Medical Necessity Clause: This procedure was performed at the request of the patient. The lesions were not inflamed but were bothersome and itchy. ABN was signed. Consent: The patient's verbal consent was obtained including but not limited to risks of crusting, scabbing, blistering, scarring, darker or lighter pigmentary change, recurrence, incomplete removal and infection. Duration Of Freeze Thaw-Cycle (Seconds): 10 Number Of Freeze-Thaw Cycles: 1 freeze-thaw cycle Detail Level: Detailed

## 2023-12-06 NOTE — ED TRIAGE NOTES
Pt arrived via  EMS d/t SOB from being Covid +. Dx on Friday. Report given that pt has AMS. However, pt is A&&Ox4. Able to voice needs and wants. Endorses missing dialysis on Monday and today. Last noted tx was Friday. Endorses very little urine output. Pt states has a pacemaker and a watchman.

## 2023-12-06 NOTE — ED PROVIDER NOTES
Medardo Haas        Pt Name: Marilynn Garzon  MRN: [id number]  [address] [place] [address] 1955  Date of evaluation: 12/6/2023  Provider: Krystal Lemus PA-C  PCP: Margaret Dhaliwal MD  Note Started: 11:49 AM EST 12/6/23      RUMA. I have evaluated this patient. CHIEF COMPLAINT       Chief Complaint   Patient presents with    Positive For Covid-19     Arrived via [facility] d/t SOB d/t Covid +; tested + on Friday; has missed dialysis Monday and today; last day was Friday; pt reported missed days were due to facility messing up transportation        HISTORY OF PRESENT ILLNESS: 1 or more Elements     History From: patient  Limitations to history : Lucas Garzon is a 70 y.o. female who presents to the emergency department with a chief complaint of shortness of breath, chest congestion, fatigue for the past 3 to 5 days. She states she was COVID-positive. Typically gets dialysis on Monday, Wednesday, Friday but missed today and 2 days ago she states due to transportation issues. She states she still makes urine but denies dysuria, hematuria, diarrhea, bloody stool or any other symptoms. She lives at [facility]. Nursing Notes were all reviewed and agreed with or any disagreements were addressed in the HPI. REVIEW OF SYSTEMS :      Review of Systems    Positives and Pertinent negatives as per HPI.      SURGICAL HISTORY     Past Surgical History:   Procedure Laterality Date    PACEMAKER INSERTION         CURRENTMEDICATIONS       Previous Medications    ACETAMINOPHEN (TYLENOL) 325 MG TABLET    Take 2 tablets by mouth every 6 hours as needed for Pain    ALBUTEROL (PROVENTIL) (2.5 MG/3ML) 0.083% NEBULIZER SOLUTION        ALLOPURINOL (ZYLOPRIM) 300 MG TABLET    Take 1 tablet by mouth daily    APIXABAN (ELIQUIS) 5 MG TABS TABLET    Take 1 tablet by mouth 2 times daily    BISACODYL (DULCOLAX) 10 MG SUPPOSITORY    Place 1 suppository rectally daily as

## 2023-12-07 ENCOUNTER — APPOINTMENT (OUTPATIENT)
Dept: GENERAL RADIOLOGY | Age: 68
DRG: 177 | End: 2023-12-07
Payer: MEDICARE

## 2023-12-07 LAB
ALBUMIN SERPL-MCNC: 3.3 G/DL (ref 3.4–5)
ALBUMIN/GLOB SERPL: 1.4 {RATIO} (ref 1.1–2.2)
ALP SERPL-CCNC: 157 U/L (ref 40–129)
ALT SERPL-CCNC: 9 U/L (ref 10–40)
ANION GAP SERPL CALCULATED.3IONS-SCNC: 15 MMOL/L (ref 3–16)
ANISOCYTOSIS BLD QL SMEAR: ABNORMAL
AST SERPL-CCNC: 11 U/L (ref 15–37)
BASOPHILS # BLD: 0 K/UL (ref 0–0.2)
BASOPHILS NFR BLD: 0.1 %
BILIRUB SERPL-MCNC: 0.9 MG/DL (ref 0–1)
BUN SERPL-MCNC: 50 MG/DL (ref 7–20)
CALCIUM SERPL-MCNC: 9 MG/DL (ref 8.3–10.6)
CHLORIDE SERPL-SCNC: 94 MMOL/L (ref 99–110)
CO2 SERPL-SCNC: 25 MMOL/L (ref 21–32)
CREAT SERPL-MCNC: 5 MG/DL (ref 0.6–1.2)
CRP SERPL-MCNC: 131.8 MG/L (ref 0–5.1)
DACRYOCYTES BLD QL SMEAR: ABNORMAL
DEPRECATED RDW RBC AUTO: 17.3 % (ref 12.4–15.4)
EOSINOPHIL # BLD: 0 K/UL (ref 0–0.6)
EOSINOPHIL NFR BLD: 0.1 %
EST. AVERAGE GLUCOSE BLD GHB EST-MCNC: 73.8 MG/DL
GFR SERPLBLD CREATININE-BSD FMLA CKD-EPI: 9 ML/MIN/{1.73_M2}
GLUCOSE BLD-MCNC: 148 MG/DL (ref 70–99)
GLUCOSE BLD-MCNC: 149 MG/DL (ref 70–99)
GLUCOSE BLD-MCNC: 183 MG/DL (ref 70–99)
GLUCOSE BLD-MCNC: 188 MG/DL (ref 70–99)
GLUCOSE SERPL-MCNC: 152 MG/DL (ref 70–99)
HBA1C MFR BLD: 4.2 %
HCT VFR BLD AUTO: 26.5 % (ref 36–48)
HGB BLD-MCNC: 8.6 G/DL (ref 12–16)
LACTATE BLDV-SCNC: 0.7 MMOL/L (ref 0.4–2)
LYMPHOCYTES # BLD: 0.4 K/UL (ref 1–5.1)
LYMPHOCYTES NFR BLD: 7.3 %
MCH RBC QN AUTO: 32.5 PG (ref 26–34)
MCHC RBC AUTO-ENTMCNC: 32.4 G/DL (ref 31–36)
MCV RBC AUTO: 100.2 FL (ref 80–100)
MICROCYTES BLD QL SMEAR: ABNORMAL
MONOCYTES # BLD: 0.1 K/UL (ref 0–1.3)
MONOCYTES NFR BLD: 2.2 %
NEUTROPHILS # BLD: 5.5 K/UL (ref 1.7–7.7)
NEUTROPHILS NFR BLD: 90.3 %
OVALOCYTES BLD QL SMEAR: ABNORMAL
PATH INTERP BLD-IMP: NO
PERFORMED ON: ABNORMAL
PLATELET # BLD AUTO: 86 K/UL (ref 135–450)
PLATELET BLD QL SMEAR: ABNORMAL
PMV BLD AUTO: 8.2 FL (ref 5–10.5)
POTASSIUM SERPL-SCNC: 5 MMOL/L (ref 3.5–5.1)
PROCALCITONIN SERPL IA-MCNC: 7.19 NG/ML (ref 0–0.15)
PROT SERPL-MCNC: 5.7 G/DL (ref 6.4–8.2)
RBC # BLD AUTO: 2.64 M/UL (ref 4–5.2)
SCHISTOCYTES BLD QL SMEAR: ABNORMAL
SLIDE REVIEW: ABNORMAL
SODIUM SERPL-SCNC: 134 MMOL/L (ref 136–145)
WBC # BLD AUTO: 6 K/UL (ref 4–11)

## 2023-12-07 PROCEDURE — 2580000003 HC RX 258: Performed by: INTERNAL MEDICINE

## 2023-12-07 PROCEDURE — 86140 C-REACTIVE PROTEIN: CPT

## 2023-12-07 PROCEDURE — 6370000000 HC RX 637 (ALT 250 FOR IP): Performed by: INTERNAL MEDICINE

## 2023-12-07 PROCEDURE — 71045 X-RAY EXAM CHEST 1 VIEW: CPT

## 2023-12-07 PROCEDURE — 36415 COLL VENOUS BLD VENIPUNCTURE: CPT

## 2023-12-07 PROCEDURE — 2060000000 HC ICU INTERMEDIATE R&B

## 2023-12-07 PROCEDURE — 2700000000 HC OXYGEN THERAPY PER DAY

## 2023-12-07 PROCEDURE — 85025 COMPLETE CBC W/AUTO DIFF WBC: CPT

## 2023-12-07 PROCEDURE — 6360000002 HC RX W HCPCS: Performed by: INTERNAL MEDICINE

## 2023-12-07 PROCEDURE — 94640 AIRWAY INHALATION TREATMENT: CPT

## 2023-12-07 PROCEDURE — 1200000000 HC SEMI PRIVATE

## 2023-12-07 PROCEDURE — 83605 ASSAY OF LACTIC ACID: CPT

## 2023-12-07 PROCEDURE — 87150 DNA/RNA AMPLIFIED PROBE: CPT

## 2023-12-07 PROCEDURE — 84145 PROCALCITONIN (PCT): CPT

## 2023-12-07 PROCEDURE — 80053 COMPREHEN METABOLIC PANEL: CPT

## 2023-12-07 PROCEDURE — 87186 SC STD MICRODIL/AGAR DIL: CPT

## 2023-12-07 PROCEDURE — 94761 N-INVAS EAR/PLS OXIMETRY MLT: CPT

## 2023-12-07 PROCEDURE — 87040 BLOOD CULTURE FOR BACTERIA: CPT

## 2023-12-07 RX ORDER — ALBUTEROL SULFATE 90 UG/1
2 AEROSOL, METERED RESPIRATORY (INHALATION)
Status: DISCONTINUED | OUTPATIENT
Start: 2023-12-07 | End: 2023-12-12 | Stop reason: HOSPADM

## 2023-12-07 RX ORDER — ALBUTEROL SULFATE 2.5 MG/3ML
2.5 SOLUTION RESPIRATORY (INHALATION) EVERY 4 HOURS PRN
Status: DISCONTINUED | OUTPATIENT
Start: 2023-12-07 | End: 2023-12-12 | Stop reason: HOSPADM

## 2023-12-07 RX ORDER — ALBUTEROL SULFATE 90 UG/1
2 AEROSOL, METERED RESPIRATORY (INHALATION)
Status: DISCONTINUED | OUTPATIENT
Start: 2023-12-07 | End: 2023-12-07

## 2023-12-07 RX ADMIN — MOMETASONE FUROATE AND FORMOTEROL FUMARATE DIHYDRATE 2 PUFF: 100; 5 AEROSOL RESPIRATORY (INHALATION) at 08:42

## 2023-12-07 RX ADMIN — Medication 10 ML: at 21:12

## 2023-12-07 RX ADMIN — PANTOPRAZOLE SODIUM 40 MG: 40 TABLET, DELAYED RELEASE ORAL at 06:55

## 2023-12-07 RX ADMIN — APIXABAN 5 MG: 5 TABLET, FILM COATED ORAL at 08:51

## 2023-12-07 RX ADMIN — LACTULOSE 10 G: 20 SOLUTION ORAL at 08:51

## 2023-12-07 RX ADMIN — OXYCODONE AND ACETAMINOPHEN 1 TABLET: 5; 325 TABLET ORAL at 21:11

## 2023-12-07 RX ADMIN — METHOCARBAMOL 500 MG: 500 TABLET ORAL at 21:13

## 2023-12-07 RX ADMIN — CETIRIZINE HYDROCHLORIDE 5 MG: 10 TABLET, FILM COATED ORAL at 21:13

## 2023-12-07 RX ADMIN — METOPROLOL TARTRATE 37.5 MG: 25 TABLET, FILM COATED ORAL at 08:51

## 2023-12-07 RX ADMIN — Medication 2 PUFF: at 08:42

## 2023-12-07 RX ADMIN — MOMETASONE FUROATE AND FORMOTEROL FUMARATE DIHYDRATE 2 PUFF: 100; 5 AEROSOL RESPIRATORY (INHALATION) at 18:24

## 2023-12-07 RX ADMIN — GABAPENTIN 100 MG: 100 CAPSULE ORAL at 21:12

## 2023-12-07 RX ADMIN — Medication 10 ML: at 08:52

## 2023-12-07 RX ADMIN — ONDANSETRON 4 MG: 4 TABLET, ORALLY DISINTEGRATING ORAL at 10:20

## 2023-12-07 RX ADMIN — METOPROLOL TARTRATE 37.5 MG: 25 TABLET, FILM COATED ORAL at 23:30

## 2023-12-07 RX ADMIN — OXYCODONE AND ACETAMINOPHEN 1 TABLET: 5; 325 TABLET ORAL at 08:51

## 2023-12-07 RX ADMIN — APIXABAN 5 MG: 5 TABLET, FILM COATED ORAL at 21:13

## 2023-12-07 RX ADMIN — FUROSEMIDE 80 MG: 40 TABLET ORAL at 08:51

## 2023-12-07 RX ADMIN — ALLOPURINOL 300 MG: 300 TABLET ORAL at 08:51

## 2023-12-07 RX ADMIN — LEVOTHYROXINE SODIUM 100 MCG: 0.1 TABLET ORAL at 06:55

## 2023-12-07 RX ADMIN — METHOCARBAMOL 500 MG: 500 TABLET ORAL at 08:51

## 2023-12-07 RX ADMIN — DEXAMETHASONE SODIUM PHOSPHATE 6 MG: 10 INJECTION, SOLUTION INTRAMUSCULAR; INTRAVENOUS at 21:13

## 2023-12-07 RX ADMIN — Medication 2 PUFF: at 18:24

## 2023-12-07 ASSESSMENT — PAIN SCALES - GENERAL
PAINLEVEL_OUTOF10: 9
PAINLEVEL_OUTOF10: 6
PAINLEVEL_OUTOF10: 10
PAINLEVEL_OUTOF10: 8
PAINLEVEL_OUTOF10: 5
PAINLEVEL_OUTOF10: 6
PAINLEVEL_OUTOF10: 9

## 2023-12-07 ASSESSMENT — PAIN DESCRIPTION - LOCATION
LOCATION: BACK
LOCATION: BACK;RECTUM
LOCATION: BACK

## 2023-12-07 ASSESSMENT — PAIN DESCRIPTION - ONSET: ONSET: ON-GOING

## 2023-12-07 ASSESSMENT — PAIN DESCRIPTION - DESCRIPTORS
DESCRIPTORS: SORE
DESCRIPTORS: NUMBNESS;SORE

## 2023-12-07 ASSESSMENT — PAIN DESCRIPTION - PAIN TYPE: TYPE: CHRONIC PAIN

## 2023-12-07 ASSESSMENT — PAIN DESCRIPTION - FREQUENCY: FREQUENCY: CONTINUOUS

## 2023-12-07 NOTE — CONSULTS
MD Martita Mahajan MD Eleuterio Cha, MD                Office: [phone]                      Fax: [phone]               [email or web address]                     Nephrology consult received. Full consult report done on 12-6-2023. Thank you for allowing us to participate in this patient's care. Please do not hesitate to contact us anytime. We will follow along with you. Blayne Orourke MD  [facility] [address]   [phone] or Via Scaled Inference.
and stable cardiomegaly. Specimen Collected: 12/06/23 11:56 EST Last Resulted: 12/06/23 11:57 EST                 ========================================  Please note that this chart entry has been generated using voice recognition software, mainly. So please excuse brevity and/or typos. While every effort and attempts have been made to ensure the accuracy of this automated transcription, some errors may have occurred; and certain words and phrases in transcription may not be entered as intended. However, inadvertent computerized transcription errors may be present. So please contact us if any clarification needed.

## 2023-12-08 PROBLEM — R78.81 POSITIVE BLOOD CULTURE: Status: ACTIVE | Noted: 2023-12-08

## 2023-12-08 PROBLEM — E87.1 HYPONATREMIA: Status: ACTIVE | Noted: 2023-12-08

## 2023-12-08 LAB
ANION GAP SERPL CALCULATED.3IONS-SCNC: 14 MMOL/L (ref 3–16)
BASOPHILS # BLD: 0 K/UL (ref 0–0.2)
BASOPHILS NFR BLD: 0.1 %
BUN SERPL-MCNC: 67 MG/DL (ref 7–20)
CALCIUM SERPL-MCNC: 8.9 MG/DL (ref 8.3–10.6)
CHLORIDE SERPL-SCNC: 91 MMOL/L (ref 99–110)
CO2 SERPL-SCNC: 24 MMOL/L (ref 21–32)
CREAT SERPL-MCNC: 6 MG/DL (ref 0.6–1.2)
CRP SERPL-MCNC: 105.9 MG/L (ref 0–5.1)
DEPRECATED RDW RBC AUTO: 17.3 % (ref 12.4–15.4)
EOSINOPHIL # BLD: 0 K/UL (ref 0–0.6)
EOSINOPHIL NFR BLD: 0 %
GFR SERPLBLD CREATININE-BSD FMLA CKD-EPI: 7 ML/MIN/{1.73_M2}
GLUCOSE BLD-MCNC: 115 MG/DL (ref 70–99)
GLUCOSE BLD-MCNC: 154 MG/DL (ref 70–99)
GLUCOSE BLD-MCNC: 171 MG/DL (ref 70–99)
GLUCOSE BLD-MCNC: 186 MG/DL (ref 70–99)
GLUCOSE SERPL-MCNC: 197 MG/DL (ref 70–99)
HCT VFR BLD AUTO: 28.1 % (ref 36–48)
HGB BLD-MCNC: 9.1 G/DL (ref 12–16)
LYMPHOCYTES # BLD: 0.4 K/UL (ref 1–5.1)
LYMPHOCYTES NFR BLD: 5 %
MCH RBC QN AUTO: 31.8 PG (ref 26–34)
MCHC RBC AUTO-ENTMCNC: 32.2 G/DL (ref 31–36)
MCV RBC AUTO: 98.7 FL (ref 80–100)
MONOCYTES # BLD: 0.2 K/UL (ref 0–1.3)
MONOCYTES NFR BLD: 2.2 %
NEUTROPHILS # BLD: 7 K/UL (ref 1.7–7.7)
NEUTROPHILS NFR BLD: 92.7 %
PERFORMED ON: ABNORMAL
PLATELET # BLD AUTO: 112 K/UL (ref 135–450)
PMV BLD AUTO: 8.6 FL (ref 5–10.5)
POTASSIUM SERPL-SCNC: 5.8 MMOL/L (ref 3.5–5.1)
PROCALCITONIN SERPL IA-MCNC: 7.44 NG/ML (ref 0–0.15)
RBC # BLD AUTO: 2.85 M/UL (ref 4–5.2)
REPORT: NORMAL
SODIUM SERPL-SCNC: 129 MMOL/L (ref 136–145)
VANCOMYCIN SERPL-MCNC: 38.2 UG/ML
WBC # BLD AUTO: 7.5 K/UL (ref 4–11)

## 2023-12-08 PROCEDURE — 2700000000 HC OXYGEN THERAPY PER DAY

## 2023-12-08 PROCEDURE — 6370000000 HC RX 637 (ALT 250 FOR IP): Performed by: INTERNAL MEDICINE

## 2023-12-08 PROCEDURE — 80202 ASSAY OF VANCOMYCIN: CPT

## 2023-12-08 PROCEDURE — 6360000002 HC RX W HCPCS: Performed by: INTERNAL MEDICINE

## 2023-12-08 PROCEDURE — 80048 BASIC METABOLIC PNL TOTAL CA: CPT

## 2023-12-08 PROCEDURE — 94761 N-INVAS EAR/PLS OXIMETRY MLT: CPT

## 2023-12-08 PROCEDURE — 2580000003 HC RX 258: Performed by: INTERNAL MEDICINE

## 2023-12-08 PROCEDURE — 86140 C-REACTIVE PROTEIN: CPT

## 2023-12-08 PROCEDURE — 2580000003 HC RX 258: Performed by: NURSE PRACTITIONER

## 2023-12-08 PROCEDURE — 84145 PROCALCITONIN (PCT): CPT

## 2023-12-08 PROCEDURE — 6360000002 HC RX W HCPCS: Performed by: NURSE PRACTITIONER

## 2023-12-08 PROCEDURE — 94640 AIRWAY INHALATION TREATMENT: CPT

## 2023-12-08 PROCEDURE — 1200000000 HC SEMI PRIVATE

## 2023-12-08 PROCEDURE — 85025 COMPLETE CBC W/AUTO DIFF WBC: CPT

## 2023-12-08 PROCEDURE — 90935 HEMODIALYSIS ONE EVALUATION: CPT

## 2023-12-08 PROCEDURE — 36415 COLL VENOUS BLD VENIPUNCTURE: CPT

## 2023-12-08 RX ADMIN — PIPERACILLIN AND TAZOBACTAM 4500 MG: 4; .5 INJECTION, POWDER, FOR SOLUTION INTRAVENOUS at 05:56

## 2023-12-08 RX ADMIN — ALLOPURINOL 300 MG: 300 TABLET ORAL at 09:00

## 2023-12-08 RX ADMIN — DEXAMETHASONE SODIUM PHOSPHATE 6 MG: 10 INJECTION, SOLUTION INTRAMUSCULAR; INTRAVENOUS at 22:29

## 2023-12-08 RX ADMIN — METOPROLOL TARTRATE 37.5 MG: 25 TABLET, FILM COATED ORAL at 09:00

## 2023-12-08 RX ADMIN — APIXABAN 5 MG: 5 TABLET, FILM COATED ORAL at 09:00

## 2023-12-08 RX ADMIN — STANDARDIZED SENNA CONCENTRATE AND DOCUSATE SODIUM 1 TABLET: 8.6; 5 TABLET ORAL at 09:00

## 2023-12-08 RX ADMIN — FUROSEMIDE 80 MG: 40 TABLET ORAL at 09:00

## 2023-12-08 RX ADMIN — PIPERACILLIN AND TAZOBACTAM 4500 MG: 4; .5 INJECTION, POWDER, FOR SOLUTION INTRAVENOUS at 22:38

## 2023-12-08 RX ADMIN — VANCOMYCIN HYDROCHLORIDE 2000 MG: 10 INJECTION, POWDER, LYOPHILIZED, FOR SOLUTION INTRAVENOUS at 03:30

## 2023-12-08 RX ADMIN — SODIUM CHLORIDE, PRESERVATIVE FREE 10 ML: 5 INJECTION INTRAVENOUS at 03:26

## 2023-12-08 RX ADMIN — MOMETASONE FUROATE AND FORMOTEROL FUMARATE DIHYDRATE 2 PUFF: 100; 5 AEROSOL RESPIRATORY (INHALATION) at 08:08

## 2023-12-08 RX ADMIN — PANTOPRAZOLE SODIUM 40 MG: 40 TABLET, DELAYED RELEASE ORAL at 06:28

## 2023-12-08 RX ADMIN — APIXABAN 5 MG: 5 TABLET, FILM COATED ORAL at 22:29

## 2023-12-08 RX ADMIN — METHOCARBAMOL 500 MG: 500 TABLET ORAL at 22:29

## 2023-12-08 RX ADMIN — OXYCODONE AND ACETAMINOPHEN 1 TABLET: 5; 325 TABLET ORAL at 09:00

## 2023-12-08 RX ADMIN — Medication 2 PUFF: at 08:08

## 2023-12-08 RX ADMIN — OXYCODONE AND ACETAMINOPHEN 1 TABLET: 5; 325 TABLET ORAL at 22:29

## 2023-12-08 RX ADMIN — PIPERACILLIN AND TAZOBACTAM 4500 MG: 4; .5 INJECTION, POWDER, FOR SOLUTION INTRAVENOUS at 09:45

## 2023-12-08 RX ADMIN — CETIRIZINE HYDROCHLORIDE 5 MG: 10 TABLET, FILM COATED ORAL at 22:29

## 2023-12-08 RX ADMIN — SODIUM CHLORIDE 25 ML: 9 INJECTION, SOLUTION INTRAVENOUS at 03:28

## 2023-12-08 RX ADMIN — Medication 10 ML: at 09:40

## 2023-12-08 RX ADMIN — FERROUS SULFATE TAB 325 MG (65 MG ELEMENTAL FE) 325 MG: 325 (65 FE) TAB at 09:00

## 2023-12-08 RX ADMIN — METOPROLOL TARTRATE 37.5 MG: 25 TABLET, FILM COATED ORAL at 22:28

## 2023-12-08 RX ADMIN — Medication 10 ML: at 22:39

## 2023-12-08 RX ADMIN — METHOCARBAMOL 500 MG: 500 TABLET ORAL at 09:00

## 2023-12-08 RX ADMIN — SODIUM CHLORIDE, PRESERVATIVE FREE 10 ML: 5 INJECTION INTRAVENOUS at 06:49

## 2023-12-08 RX ADMIN — LEVOTHYROXINE SODIUM 100 MCG: 0.1 TABLET ORAL at 06:28

## 2023-12-08 RX ADMIN — GABAPENTIN 100 MG: 100 CAPSULE ORAL at 22:28

## 2023-12-08 ASSESSMENT — PAIN SCALES - GENERAL
PAINLEVEL_OUTOF10: 9
PAINLEVEL_OUTOF10: 7
PAINLEVEL_OUTOF10: 7
PAINLEVEL_OUTOF10: 8
PAINLEVEL_OUTOF10: 0

## 2023-12-08 ASSESSMENT — PAIN DESCRIPTION - ONSET: ONSET: ON-GOING

## 2023-12-08 ASSESSMENT — PAIN DESCRIPTION - LOCATION
LOCATION: BACK

## 2023-12-08 ASSESSMENT — PAIN DESCRIPTION - DESCRIPTORS
DESCRIPTORS: ACHING;DISCOMFORT
DESCRIPTORS: DISCOMFORT
DESCRIPTORS: DISCOMFORT

## 2023-12-08 ASSESSMENT — PAIN DESCRIPTION - PAIN TYPE: TYPE: CHRONIC PAIN

## 2023-12-08 ASSESSMENT — PAIN DESCRIPTION - FREQUENCY: FREQUENCY: CONTINUOUS

## 2023-12-08 ASSESSMENT — PAIN - FUNCTIONAL ASSESSMENT: PAIN_FUNCTIONAL_ASSESSMENT: PREVENTS OR INTERFERES SOME ACTIVE ACTIVITIES AND ADLS

## 2023-12-08 ASSESSMENT — PAIN SCALES - WONG BAKER: WONGBAKER_NUMERICALRESPONSE: 0

## 2023-12-08 ASSESSMENT — PAIN DESCRIPTION - ORIENTATION: ORIENTATION: POSTERIOR

## 2023-12-08 NOTE — PLAN OF CARE
Problem: Pain  Goal: Verbalizes/displays adequate comfort level or baseline comfort level  Outcome: Progressing      12/07/23 2111   Pain Assessment   Pain Assessment 0-10   Pain Level 10   Pain Location Back;Rectum   Pain Frequency Continuous   Pain Onset On-going   Non-Pharmaceutical Pain Intervention(s) Emotional support  (and giving percocet now)

## 2023-12-08 NOTE — PLAN OF CARE

## 2023-12-08 NOTE — FLOWSHEET NOTE
Carter from lab at [facility] called with positive blood cultures at 0212 and notified pharmacy. Pharmacy notified hospitalist at 3500 and orders placed for IV antibiotics. PIV flushed, IV started, and IVPB Vancocin started at 0330; pt tolerated well. See [facility] & all flowsheets. Will continue to monitor.

## 2023-12-08 NOTE — FLOWSHEET NOTE
12/08/23 0030   Urine Assessment   Urinary Status Voiding  (urinated in bedpan while having a BM and on bedpad)   Urine Color Ashley   Urine Appearance LEE ANN   Urine Odor No odor   Stool Assessment   Incontinence No   Stool Appearance Soft; Formed   Stool Color Brown   Stool Amount Large  (on bedpan)   Stool Source Rectum   Last BM (including prior to admit) 12/08/23   Unmeasured Output   Urine Occurrence 1   Stool Occurrence 1   Emesis Occurrence 0

## 2023-12-09 PROBLEM — R79.89 ELEVATED TROPONIN: Status: RESOLVED | Noted: 2023-11-09 | Resolved: 2023-12-09

## 2023-12-09 LAB
ANION GAP SERPL CALCULATED.3IONS-SCNC: 14 MMOL/L (ref 3–16)
BASOPHILS # BLD: 0 K/UL (ref 0–0.2)
BASOPHILS NFR BLD: 0.1 %
BUN SERPL-MCNC: 47 MG/DL (ref 7–20)
CALCIUM SERPL-MCNC: 8.6 MG/DL (ref 8.3–10.6)
CHLORIDE SERPL-SCNC: 98 MMOL/L (ref 99–110)
CO2 SERPL-SCNC: 23 MMOL/L (ref 21–32)
CREAT SERPL-MCNC: 4.4 MG/DL (ref 0.6–1.2)
DEPRECATED RDW RBC AUTO: 16.7 % (ref 12.4–15.4)
EOSINOPHIL # BLD: 0 K/UL (ref 0–0.6)
EOSINOPHIL NFR BLD: 0 %
GFR SERPLBLD CREATININE-BSD FMLA CKD-EPI: 10 ML/MIN/{1.73_M2}
GLUCOSE BLD-MCNC: 124 MG/DL (ref 70–99)
GLUCOSE BLD-MCNC: 127 MG/DL (ref 70–99)
GLUCOSE BLD-MCNC: 175 MG/DL (ref 70–99)
GLUCOSE BLD-MCNC: 256 MG/DL (ref 70–99)
GLUCOSE SERPL-MCNC: 214 MG/DL (ref 70–99)
HCT VFR BLD AUTO: 26.2 % (ref 36–48)
HGB BLD-MCNC: 8.4 G/DL (ref 12–16)
LYMPHOCYTES # BLD: 0.3 K/UL (ref 1–5.1)
LYMPHOCYTES NFR BLD: 5.8 %
MCH RBC QN AUTO: 32 PG (ref 26–34)
MCHC RBC AUTO-ENTMCNC: 32.2 G/DL (ref 31–36)
MCV RBC AUTO: 99.1 FL (ref 80–100)
MONOCYTES # BLD: 0.1 K/UL (ref 0–1.3)
MONOCYTES NFR BLD: 1.9 %
NEUTROPHILS # BLD: 5.4 K/UL (ref 1.7–7.7)
NEUTROPHILS NFR BLD: 92.2 %
PERFORMED ON: ABNORMAL
PLATELET # BLD AUTO: 124 K/UL (ref 135–450)
PMV BLD AUTO: 8 FL (ref 5–10.5)
POTASSIUM SERPL-SCNC: 4.5 MMOL/L (ref 3.5–5.1)
PROCALCITONIN SERPL IA-MCNC: 6.38 NG/ML (ref 0–0.15)
RBC # BLD AUTO: 2.64 M/UL (ref 4–5.2)
SODIUM SERPL-SCNC: 135 MMOL/L (ref 136–145)
VANCOMYCIN SERPL-MCNC: 16.1 UG/ML
WBC # BLD AUTO: 5.8 K/UL (ref 4–11)

## 2023-12-09 PROCEDURE — 6360000002 HC RX W HCPCS: Performed by: NURSE PRACTITIONER

## 2023-12-09 PROCEDURE — 2580000003 HC RX 258: Performed by: NURSE PRACTITIONER

## 2023-12-09 PROCEDURE — 36415 COLL VENOUS BLD VENIPUNCTURE: CPT

## 2023-12-09 PROCEDURE — 1200000000 HC SEMI PRIVATE

## 2023-12-09 PROCEDURE — 84145 PROCALCITONIN (PCT): CPT

## 2023-12-09 PROCEDURE — 2700000000 HC OXYGEN THERAPY PER DAY

## 2023-12-09 PROCEDURE — 94761 N-INVAS EAR/PLS OXIMETRY MLT: CPT

## 2023-12-09 PROCEDURE — 80048 BASIC METABOLIC PNL TOTAL CA: CPT

## 2023-12-09 PROCEDURE — 6360000002 HC RX W HCPCS: Performed by: INTERNAL MEDICINE

## 2023-12-09 PROCEDURE — 94640 AIRWAY INHALATION TREATMENT: CPT

## 2023-12-09 PROCEDURE — 85025 COMPLETE CBC W/AUTO DIFF WBC: CPT

## 2023-12-09 PROCEDURE — 6370000000 HC RX 637 (ALT 250 FOR IP): Performed by: INTERNAL MEDICINE

## 2023-12-09 PROCEDURE — 2580000003 HC RX 258: Performed by: INTERNAL MEDICINE

## 2023-12-09 PROCEDURE — 80202 ASSAY OF VANCOMYCIN: CPT

## 2023-12-09 RX ADMIN — METHOCARBAMOL 500 MG: 500 TABLET ORAL at 20:03

## 2023-12-09 RX ADMIN — OXYCODONE AND ACETAMINOPHEN 1 TABLET: 5; 325 TABLET ORAL at 10:45

## 2023-12-09 RX ADMIN — METOPROLOL TARTRATE 37.5 MG: 25 TABLET, FILM COATED ORAL at 10:45

## 2023-12-09 RX ADMIN — Medication 2 PUFF: at 22:31

## 2023-12-09 RX ADMIN — Medication 10 ML: at 20:04

## 2023-12-09 RX ADMIN — METOPROLOL TARTRATE 37.5 MG: 25 TABLET, FILM COATED ORAL at 20:03

## 2023-12-09 RX ADMIN — DEXAMETHASONE SODIUM PHOSPHATE 6 MG: 10 INJECTION, SOLUTION INTRAMUSCULAR; INTRAVENOUS at 20:04

## 2023-12-09 RX ADMIN — ALLOPURINOL 300 MG: 300 TABLET ORAL at 10:45

## 2023-12-09 RX ADMIN — FUROSEMIDE 80 MG: 40 TABLET ORAL at 10:45

## 2023-12-09 RX ADMIN — LEVOTHYROXINE SODIUM 100 MCG: 0.1 TABLET ORAL at 06:30

## 2023-12-09 RX ADMIN — PIPERACILLIN AND TAZOBACTAM 4500 MG: 4; .5 INJECTION, POWDER, FOR SOLUTION INTRAVENOUS at 20:14

## 2023-12-09 RX ADMIN — Medication 10 ML: at 10:40

## 2023-12-09 RX ADMIN — MOMETASONE FUROATE AND FORMOTEROL FUMARATE DIHYDRATE 2 PUFF: 100; 5 AEROSOL RESPIRATORY (INHALATION) at 22:32

## 2023-12-09 RX ADMIN — INSULIN LISPRO 4 UNITS: 100 INJECTION, SOLUTION INTRAVENOUS; SUBCUTANEOUS at 13:15

## 2023-12-09 RX ADMIN — METHOCARBAMOL 500 MG: 500 TABLET ORAL at 10:45

## 2023-12-09 RX ADMIN — CETIRIZINE HYDROCHLORIDE 5 MG: 10 TABLET, FILM COATED ORAL at 20:03

## 2023-12-09 RX ADMIN — PANTOPRAZOLE SODIUM 40 MG: 40 TABLET, DELAYED RELEASE ORAL at 06:30

## 2023-12-09 RX ADMIN — MOMETASONE FUROATE AND FORMOTEROL FUMARATE DIHYDRATE 2 PUFF: 100; 5 AEROSOL RESPIRATORY (INHALATION) at 08:45

## 2023-12-09 RX ADMIN — OXYCODONE AND ACETAMINOPHEN 1 TABLET: 5; 325 TABLET ORAL at 20:04

## 2023-12-09 RX ADMIN — LACTULOSE 10 G: 20 SOLUTION ORAL at 20:04

## 2023-12-09 RX ADMIN — PIPERACILLIN AND TAZOBACTAM 4500 MG: 4; .5 INJECTION, POWDER, FOR SOLUTION INTRAVENOUS at 10:45

## 2023-12-09 RX ADMIN — LACTULOSE 10 G: 20 SOLUTION ORAL at 10:45

## 2023-12-09 RX ADMIN — GABAPENTIN 100 MG: 100 CAPSULE ORAL at 20:04

## 2023-12-09 RX ADMIN — APIXABAN 5 MG: 5 TABLET, FILM COATED ORAL at 10:45

## 2023-12-09 RX ADMIN — Medication 2 PUFF: at 08:45

## 2023-12-09 RX ADMIN — APIXABAN 5 MG: 5 TABLET, FILM COATED ORAL at 20:04

## 2023-12-09 ASSESSMENT — PAIN SCALES - GENERAL
PAINLEVEL_OUTOF10: 0
PAINLEVEL_OUTOF10: 0
PAINLEVEL_OUTOF10: 7
PAINLEVEL_OUTOF10: 0
PAINLEVEL_OUTOF10: 0

## 2023-12-09 ASSESSMENT — PAIN DESCRIPTION - LOCATION: LOCATION: BACK

## 2023-12-09 NOTE — PLAN OF CARE
Problem: Discharge Planning  Goal: Discharge to home or other facility with appropriate resources  Recent Flowsheet Documentation  Taken 12/8/2023 2000 by Tilden Halsted  Discharge to home or other facility with appropriate resources:   Identify barriers to discharge with patient and caregiver   Arrange for needed discharge resources and transportation as appropriate   Identify discharge learning needs (meds, wound care, etc)   Arrange for interpreters to assist at discharge as needed   Refer to discharge planning if patient needs post-hospital services based on physician order or complex needs related to functional status, cognitive ability or social support system  12/8/2023 1726 by Joi Nassar RN  Outcome: Progressing     Problem: Pain  Goal: Verbalizes/displays adequate comfort level or baseline comfort level  Recent Flowsheet Documentation  Taken 12/8/2023 2220 by Tilden Halsted  Verbalizes/displays adequate comfort level or baseline comfort level:   Encourage patient to monitor pain and request assistance   Assess pain using appropriate pain scale   Administer analgesics based on type and severity of pain and evaluate response   Implement non-pharmacological measures as appropriate and evaluate response   Consider cultural and social influences on pain and pain management   Notify Licensed Independent Practitioner if interventions unsuccessful or patient reports new pain  12/8/2023 1726 by Joi Nassar RN  Outcome: Progressing     Problem: Skin/Tissue Integrity  Goal: Absence of new skin breakdown  Description: 1. Monitor for areas of redness and/or skin breakdown  2. Assess vascular access sites hourly  3. Every 4-6 hours minimum:  Change oxygen saturation probe site  4. Every 4-6 hours:  If on nasal continuous positive airway pressure, respiratory therapy assess nares and determine need for appliance change or resting period.   12/8/2023 1726 by Joi Nassar RN  Outcome: Progressing     Problem:

## 2023-12-09 NOTE — PLAN OF CARE
Problem: Discharge Planning  Goal: Discharge to home or other facility with appropriate resources  Outcome: Progressing     Problem: Pain  Goal: Verbalizes/displays adequate comfort level or baseline comfort level  Outcome: Progressing  Flowsheets (Taken 12/9/2023 0440 by Purnima Cardenas)  Verbalizes/displays adequate comfort level or baseline comfort level:   Encourage patient to monitor pain and request assistance   Assess pain using appropriate pain scale   Administer analgesics based on type and severity of pain and evaluate response   Implement non-pharmacological measures as appropriate and evaluate response   Consider cultural and social influences on pain and pain management   Notify Licensed Independent Practitioner if interventions unsuccessful or patient reports new pain     Problem: Skin/Tissue Integrity  Goal: Absence of new skin breakdown  Description: 1. Monitor for areas of redness and/or skin breakdown  2. Assess vascular access sites hourly  3. Every 4-6 hours minimum:  Change oxygen saturation probe site  4. Every 4-6 hours:  If on nasal continuous positive airway pressure, respiratory therapy assess nares and determine need for appliance change or resting period.   Outcome: Progressing     Problem: ABCDS Injury Assessment  Goal: Absence of physical injury  Outcome: Progressing     Problem: Safety - Adult  Goal: Free from fall injury  Outcome: Progressing     Problem: Chronic Conditions and Co-morbidities  Goal: Patient's chronic conditions and co-morbidity symptoms are monitored and maintained or improved  Outcome: Progressing     Problem: Hematologic - Adult  Goal: Maintains hematologic stability  Outcome: Progressing

## 2023-12-09 NOTE — DIALYSIS
Treatment time: 3 hours  Net UF:  2600 ml     Pre weight: 106 kg  Post weight:103.4 kg      Access used: R TDC    Access function: well with  ml/min     Medications or blood products given: none      Regular outpatient schedule: MWF     Summary of response to treatment: Patient tolerated treatment well and without any complications. Patient remained stable throughout entire treatment and upon exiting the dialysis suite via transport. Report given to Juan Bullard RN and copy of dialysis treatment record placed in chart, to be scanned into EMR.

## 2023-12-10 LAB
ALBUMIN SERPL-MCNC: 3.3 G/DL (ref 3.4–5)
ANION GAP SERPL CALCULATED.3IONS-SCNC: 13 MMOL/L (ref 3–16)
BACTERIA BLD CULT: ABNORMAL
BACTERIA BLD CULT: ABNORMAL
BASOPHILS # BLD: 0 K/UL (ref 0–0.2)
BASOPHILS NFR BLD: 0 %
BUN SERPL-MCNC: 64 MG/DL (ref 7–20)
CALCIUM SERPL-MCNC: 8.4 MG/DL (ref 8.3–10.6)
CHLORIDE SERPL-SCNC: 97 MMOL/L (ref 99–110)
CO2 SERPL-SCNC: 24 MMOL/L (ref 21–32)
CREAT SERPL-MCNC: 5.4 MG/DL (ref 0.6–1.2)
DEPRECATED RDW RBC AUTO: 17.1 % (ref 12.4–15.4)
EOSINOPHIL # BLD: 0 K/UL (ref 0–0.6)
EOSINOPHIL NFR BLD: 0 %
GFR SERPLBLD CREATININE-BSD FMLA CKD-EPI: 8 ML/MIN/{1.73_M2}
GLUCOSE BLD-MCNC: 112 MG/DL (ref 70–99)
GLUCOSE BLD-MCNC: 118 MG/DL (ref 70–99)
GLUCOSE BLD-MCNC: 135 MG/DL (ref 70–99)
GLUCOSE BLD-MCNC: 151 MG/DL (ref 70–99)
GLUCOSE SERPL-MCNC: 173 MG/DL (ref 70–99)
HCT VFR BLD AUTO: 26.3 % (ref 36–48)
HGB BLD-MCNC: 8.2 G/DL (ref 12–16)
LYMPHOCYTES # BLD: 0.5 K/UL (ref 1–5.1)
LYMPHOCYTES NFR BLD: 9.3 %
MCH RBC QN AUTO: 30.9 PG (ref 26–34)
MCHC RBC AUTO-ENTMCNC: 31.2 G/DL (ref 31–36)
MCV RBC AUTO: 99 FL (ref 80–100)
MONOCYTES # BLD: 0.2 K/UL (ref 0–1.3)
MONOCYTES NFR BLD: 4.1 %
NEUTROPHILS # BLD: 4.6 K/UL (ref 1.7–7.7)
NEUTROPHILS NFR BLD: 86.6 %
ORGANISM: ABNORMAL
ORGANISM: ABNORMAL
PERFORMED ON: ABNORMAL
PHOSPHATE SERPL-MCNC: 5 MG/DL (ref 2.5–4.9)
PLATELET # BLD AUTO: 122 K/UL (ref 135–450)
PMV BLD AUTO: 7.7 FL (ref 5–10.5)
POTASSIUM SERPL-SCNC: 5 MMOL/L (ref 3.5–5.1)
RBC # BLD AUTO: 2.65 M/UL (ref 4–5.2)
SODIUM SERPL-SCNC: 134 MMOL/L (ref 136–145)
WBC # BLD AUTO: 5.3 K/UL (ref 4–11)

## 2023-12-10 PROCEDURE — 94761 N-INVAS EAR/PLS OXIMETRY MLT: CPT

## 2023-12-10 PROCEDURE — 1200000000 HC SEMI PRIVATE

## 2023-12-10 PROCEDURE — 94640 AIRWAY INHALATION TREATMENT: CPT

## 2023-12-10 PROCEDURE — 85025 COMPLETE CBC W/AUTO DIFF WBC: CPT

## 2023-12-10 PROCEDURE — 36415 COLL VENOUS BLD VENIPUNCTURE: CPT

## 2023-12-10 PROCEDURE — 6360000002 HC RX W HCPCS: Performed by: NURSE PRACTITIONER

## 2023-12-10 PROCEDURE — 2700000000 HC OXYGEN THERAPY PER DAY

## 2023-12-10 PROCEDURE — 2580000003 HC RX 258: Performed by: NURSE PRACTITIONER

## 2023-12-10 PROCEDURE — 80069 RENAL FUNCTION PANEL: CPT

## 2023-12-10 PROCEDURE — 2580000003 HC RX 258: Performed by: INTERNAL MEDICINE

## 2023-12-10 PROCEDURE — 6370000000 HC RX 637 (ALT 250 FOR IP): Performed by: INTERNAL MEDICINE

## 2023-12-10 PROCEDURE — 6360000002 HC RX W HCPCS: Performed by: INTERNAL MEDICINE

## 2023-12-10 RX ADMIN — Medication 2 PUFF: at 10:55

## 2023-12-10 RX ADMIN — PIPERACILLIN AND TAZOBACTAM 4500 MG: 4; .5 INJECTION, POWDER, FOR SOLUTION INTRAVENOUS at 08:20

## 2023-12-10 RX ADMIN — Medication 10 ML: at 20:43

## 2023-12-10 RX ADMIN — Medication 10 ML: at 08:48

## 2023-12-10 RX ADMIN — METHOCARBAMOL 500 MG: 500 TABLET ORAL at 20:42

## 2023-12-10 RX ADMIN — GABAPENTIN 100 MG: 100 CAPSULE ORAL at 20:43

## 2023-12-10 RX ADMIN — FUROSEMIDE 80 MG: 40 TABLET ORAL at 08:20

## 2023-12-10 RX ADMIN — AMPICILLIN SODIUM 2000 MG: 2 INJECTION, POWDER, FOR SOLUTION INTRAVENOUS at 23:47

## 2023-12-10 RX ADMIN — FERROUS SULFATE TAB 325 MG (65 MG ELEMENTAL FE) 325 MG: 325 (65 FE) TAB at 08:20

## 2023-12-10 RX ADMIN — LACTULOSE 10 G: 20 SOLUTION ORAL at 08:20

## 2023-12-10 RX ADMIN — METOPROLOL TARTRATE 37.5 MG: 25 TABLET, FILM COATED ORAL at 20:42

## 2023-12-10 RX ADMIN — LEVOTHYROXINE SODIUM 100 MCG: 0.1 TABLET ORAL at 05:47

## 2023-12-10 RX ADMIN — APIXABAN 5 MG: 5 TABLET, FILM COATED ORAL at 20:42

## 2023-12-10 RX ADMIN — MOMETASONE FUROATE AND FORMOTEROL FUMARATE DIHYDRATE 2 PUFF: 100; 5 AEROSOL RESPIRATORY (INHALATION) at 21:27

## 2023-12-10 RX ADMIN — MOMETASONE FUROATE AND FORMOTEROL FUMARATE DIHYDRATE 2 PUFF: 100; 5 AEROSOL RESPIRATORY (INHALATION) at 10:55

## 2023-12-10 RX ADMIN — AMPICILLIN SODIUM 2000 MG: 2 INJECTION, POWDER, FOR SOLUTION INTRAVENOUS at 16:26

## 2023-12-10 RX ADMIN — OXYCODONE AND ACETAMINOPHEN 1 TABLET: 5; 325 TABLET ORAL at 08:20

## 2023-12-10 RX ADMIN — CETIRIZINE HYDROCHLORIDE 5 MG: 10 TABLET, FILM COATED ORAL at 20:42

## 2023-12-10 RX ADMIN — METHOCARBAMOL 500 MG: 500 TABLET ORAL at 08:20

## 2023-12-10 RX ADMIN — APIXABAN 5 MG: 5 TABLET, FILM COATED ORAL at 08:20

## 2023-12-10 RX ADMIN — LACTULOSE 10 G: 20 SOLUTION ORAL at 20:42

## 2023-12-10 RX ADMIN — DEXAMETHASONE SODIUM PHOSPHATE 6 MG: 10 INJECTION, SOLUTION INTRAMUSCULAR; INTRAVENOUS at 20:43

## 2023-12-10 RX ADMIN — PANTOPRAZOLE SODIUM 40 MG: 40 TABLET, DELAYED RELEASE ORAL at 05:46

## 2023-12-10 RX ADMIN — METOPROLOL TARTRATE 37.5 MG: 25 TABLET, FILM COATED ORAL at 08:20

## 2023-12-10 RX ADMIN — OXYCODONE AND ACETAMINOPHEN 1 TABLET: 5; 325 TABLET ORAL at 20:42

## 2023-12-10 RX ADMIN — Medication 2 PUFF: at 21:27

## 2023-12-10 RX ADMIN — ALLOPURINOL 300 MG: 300 TABLET ORAL at 08:20

## 2023-12-10 ASSESSMENT — PAIN DESCRIPTION - FREQUENCY: FREQUENCY: CONTINUOUS

## 2023-12-10 ASSESSMENT — PAIN DESCRIPTION - PAIN TYPE: TYPE: CHRONIC PAIN

## 2023-12-10 ASSESSMENT — PAIN DESCRIPTION - DESCRIPTORS: DESCRIPTORS: ACHING;DISCOMFORT

## 2023-12-10 ASSESSMENT — PAIN SCALES - GENERAL: PAINLEVEL_OUTOF10: 7

## 2023-12-10 ASSESSMENT — PAIN DESCRIPTION - ONSET: ONSET: ON-GOING

## 2023-12-10 ASSESSMENT — PAIN - FUNCTIONAL ASSESSMENT: PAIN_FUNCTIONAL_ASSESSMENT: PREVENTS OR INTERFERES SOME ACTIVE ACTIVITIES AND ADLS

## 2023-12-10 ASSESSMENT — PAIN DESCRIPTION - LOCATION: LOCATION: BACK

## 2023-12-10 ASSESSMENT — PAIN DESCRIPTION - ORIENTATION: ORIENTATION: POSTERIOR

## 2023-12-10 NOTE — PLAN OF CARE
Problem: Safety - Adult  Goal: Free from fall injury  12/9/2023 2059 by Smita Babin RN  Outcome: Progressing  12/9/2023 1707 by Wilder Arevalo RN  Outcome: Progressing     Problem: Chronic Conditions and Co-morbidities  Goal: Patient's chronic conditions and co-morbidity symptoms are monitored and maintained or improved  12/9/2023 2059 by Smita Babin RN  Outcome: Progressing  12/9/2023 1707 by Wilder Arevalo RN  Outcome: Progressing

## 2023-12-10 NOTE — PLAN OF CARE
Problem: Discharge Planning  Goal: Discharge to home or other facility with appropriate resources  Outcome: Progressing  Flowsheets (Taken 12/10/2023 0815)  Discharge to home or other facility with appropriate resources: Identify barriers to discharge with patient and caregiver     Problem: Pain  Goal: Verbalizes/displays adequate comfort level or baseline comfort level  Outcome: Progressing  Flowsheets (Taken 12/10/2023 0815)  Verbalizes/displays adequate comfort level or baseline comfort level:   Encourage patient to monitor pain and request assistance   Assess pain using appropriate pain scale

## 2023-12-11 LAB
ANION GAP SERPL CALCULATED.3IONS-SCNC: 15 MMOL/L (ref 3–16)
BASOPHILS # BLD: 0 K/UL (ref 0–0.2)
BASOPHILS NFR BLD: 0.1 %
BUN SERPL-MCNC: 71 MG/DL (ref 7–20)
CALCIUM SERPL-MCNC: 8.7 MG/DL (ref 8.3–10.6)
CHLORIDE SERPL-SCNC: 94 MMOL/L (ref 99–110)
CO2 SERPL-SCNC: 21 MMOL/L (ref 21–32)
CREAT SERPL-MCNC: 6.4 MG/DL (ref 0.6–1.2)
CRP SERPL-MCNC: 21.9 MG/L (ref 0–5.1)
DEPRECATED RDW RBC AUTO: 17.3 % (ref 12.4–15.4)
EOSINOPHIL # BLD: 0 K/UL (ref 0–0.6)
EOSINOPHIL NFR BLD: 0.2 %
GFR SERPLBLD CREATININE-BSD FMLA CKD-EPI: 7 ML/MIN/{1.73_M2}
GLUCOSE BLD-MCNC: 139 MG/DL (ref 70–99)
GLUCOSE BLD-MCNC: 139 MG/DL (ref 70–99)
GLUCOSE SERPL-MCNC: 183 MG/DL (ref 70–99)
HCT VFR BLD AUTO: 27.2 % (ref 36–48)
HGB BLD-MCNC: 8.8 G/DL (ref 12–16)
LYMPHOCYTES # BLD: 0.6 K/UL (ref 1–5.1)
LYMPHOCYTES NFR BLD: 9.3 %
MCH RBC QN AUTO: 32 PG (ref 26–34)
MCHC RBC AUTO-ENTMCNC: 32.3 G/DL (ref 31–36)
MCV RBC AUTO: 99.1 FL (ref 80–100)
MONOCYTES # BLD: 0.2 K/UL (ref 0–1.3)
MONOCYTES NFR BLD: 3.7 %
NEUTROPHILS # BLD: 5.2 K/UL (ref 1.7–7.7)
NEUTROPHILS NFR BLD: 86.7 %
PERFORMED ON: ABNORMAL
PERFORMED ON: ABNORMAL
PLATELET # BLD AUTO: 139 K/UL (ref 135–450)
PMV BLD AUTO: 7.6 FL (ref 5–10.5)
POTASSIUM SERPL-SCNC: 5.3 MMOL/L (ref 3.5–5.1)
PROCALCITONIN SERPL IA-MCNC: 3.77 NG/ML (ref 0–0.15)
RBC # BLD AUTO: 2.75 M/UL (ref 4–5.2)
SODIUM SERPL-SCNC: 130 MMOL/L (ref 136–145)
VANCOMYCIN SERPL-MCNC: 13.4 UG/ML
WBC # BLD AUTO: 6 K/UL (ref 4–11)

## 2023-12-11 PROCEDURE — 97162 PT EVAL MOD COMPLEX 30 MIN: CPT

## 2023-12-11 PROCEDURE — 94761 N-INVAS EAR/PLS OXIMETRY MLT: CPT

## 2023-12-11 PROCEDURE — 36415 COLL VENOUS BLD VENIPUNCTURE: CPT

## 2023-12-11 PROCEDURE — 1200000000 HC SEMI PRIVATE

## 2023-12-11 PROCEDURE — 6360000002 HC RX W HCPCS: Performed by: INTERNAL MEDICINE

## 2023-12-11 PROCEDURE — 80202 ASSAY OF VANCOMYCIN: CPT

## 2023-12-11 PROCEDURE — 94640 AIRWAY INHALATION TREATMENT: CPT

## 2023-12-11 PROCEDURE — 90935 HEMODIALYSIS ONE EVALUATION: CPT

## 2023-12-11 PROCEDURE — 97530 THERAPEUTIC ACTIVITIES: CPT

## 2023-12-11 PROCEDURE — 80048 BASIC METABOLIC PNL TOTAL CA: CPT

## 2023-12-11 PROCEDURE — 84145 PROCALCITONIN (PCT): CPT

## 2023-12-11 PROCEDURE — 2580000003 HC RX 258: Performed by: INTERNAL MEDICINE

## 2023-12-11 PROCEDURE — 86140 C-REACTIVE PROTEIN: CPT

## 2023-12-11 PROCEDURE — 97535 SELF CARE MNGMENT TRAINING: CPT

## 2023-12-11 PROCEDURE — 85025 COMPLETE CBC W/AUTO DIFF WBC: CPT

## 2023-12-11 PROCEDURE — 2700000000 HC OXYGEN THERAPY PER DAY

## 2023-12-11 PROCEDURE — 6370000000 HC RX 637 (ALT 250 FOR IP): Performed by: INTERNAL MEDICINE

## 2023-12-11 PROCEDURE — 97166 OT EVAL MOD COMPLEX 45 MIN: CPT

## 2023-12-11 RX ORDER — AMPICILLIN 500 MG/1
500 CAPSULE ORAL 4 TIMES DAILY
Qty: 20 CAPSULE | Refills: 0 | Status: SHIPPED | OUTPATIENT
Start: 2023-12-11 | End: 2023-12-16

## 2023-12-11 RX ORDER — OXYCODONE HYDROCHLORIDE AND ACETAMINOPHEN 5; 325 MG/1; MG/1
1 TABLET ORAL 2 TIMES DAILY
Qty: 4 TABLET | Refills: 0 | Status: SHIPPED | OUTPATIENT
Start: 2023-12-11 | End: 2023-12-13

## 2023-12-11 RX ORDER — DEXAMETHASONE 2 MG/1
2 TABLET ORAL 2 TIMES DAILY WITH MEALS
Qty: 8 TABLET | Refills: 0 | Status: SHIPPED | OUTPATIENT
Start: 2023-12-11 | End: 2023-12-15

## 2023-12-11 RX ADMIN — AMPICILLIN SODIUM 2000 MG: 2 INJECTION, POWDER, FOR SOLUTION INTRAVENOUS at 10:24

## 2023-12-11 RX ADMIN — FUROSEMIDE 80 MG: 40 TABLET ORAL at 10:26

## 2023-12-11 RX ADMIN — Medication 2 PUFF: at 07:47

## 2023-12-11 RX ADMIN — OXYCODONE AND ACETAMINOPHEN 1 TABLET: 5; 325 TABLET ORAL at 20:57

## 2023-12-11 RX ADMIN — AMPICILLIN SODIUM 2000 MG: 2 INJECTION, POWDER, FOR SOLUTION INTRAVENOUS at 23:41

## 2023-12-11 RX ADMIN — Medication 10 ML: at 21:02

## 2023-12-11 RX ADMIN — LEVOTHYROXINE SODIUM 100 MCG: 0.1 TABLET ORAL at 05:36

## 2023-12-11 RX ADMIN — PANTOPRAZOLE SODIUM 40 MG: 40 TABLET, DELAYED RELEASE ORAL at 05:36

## 2023-12-11 RX ADMIN — METOPROLOL TARTRATE 37.5 MG: 25 TABLET, FILM COATED ORAL at 10:26

## 2023-12-11 RX ADMIN — CETIRIZINE HYDROCHLORIDE 5 MG: 10 TABLET, FILM COATED ORAL at 20:56

## 2023-12-11 RX ADMIN — Medication 2 PUFF: at 22:39

## 2023-12-11 RX ADMIN — MOMETASONE FUROATE AND FORMOTEROL FUMARATE DIHYDRATE 2 PUFF: 100; 5 AEROSOL RESPIRATORY (INHALATION) at 07:47

## 2023-12-11 RX ADMIN — SODIUM CHLORIDE 25 ML: 9 INJECTION, SOLUTION INTRAVENOUS at 23:40

## 2023-12-11 RX ADMIN — METHOCARBAMOL 500 MG: 500 TABLET ORAL at 10:26

## 2023-12-11 RX ADMIN — ALLOPURINOL 300 MG: 300 TABLET ORAL at 10:26

## 2023-12-11 RX ADMIN — MOMETASONE FUROATE AND FORMOTEROL FUMARATE DIHYDRATE 2 PUFF: 100; 5 AEROSOL RESPIRATORY (INHALATION) at 22:39

## 2023-12-11 RX ADMIN — GABAPENTIN 100 MG: 100 CAPSULE ORAL at 20:57

## 2023-12-11 RX ADMIN — OXYCODONE AND ACETAMINOPHEN 1 TABLET: 5; 325 TABLET ORAL at 10:26

## 2023-12-11 RX ADMIN — METHOCARBAMOL 500 MG: 500 TABLET ORAL at 20:58

## 2023-12-11 RX ADMIN — Medication 10 ML: at 14:12

## 2023-12-11 RX ADMIN — METOPROLOL TARTRATE 37.5 MG: 25 TABLET, FILM COATED ORAL at 20:55

## 2023-12-11 RX ADMIN — APIXABAN 5 MG: 5 TABLET, FILM COATED ORAL at 20:57

## 2023-12-11 RX ADMIN — DEXAMETHASONE SODIUM PHOSPHATE 6 MG: 10 INJECTION, SOLUTION INTRAMUSCULAR; INTRAVENOUS at 20:59

## 2023-12-11 RX ADMIN — APIXABAN 5 MG: 5 TABLET, FILM COATED ORAL at 10:26

## 2023-12-11 ASSESSMENT — PAIN DESCRIPTION - ORIENTATION
ORIENTATION: LOWER

## 2023-12-11 ASSESSMENT — PAIN SCALES - GENERAL
PAINLEVEL_OUTOF10: 8
PAINLEVEL_OUTOF10: 8
PAINLEVEL_OUTOF10: 7
PAINLEVEL_OUTOF10: 7

## 2023-12-11 ASSESSMENT — PAIN DESCRIPTION - LOCATION
LOCATION: BACK

## 2023-12-11 ASSESSMENT — PAIN DESCRIPTION - ONSET
ONSET: ON-GOING

## 2023-12-11 ASSESSMENT — PAIN DESCRIPTION - FREQUENCY
FREQUENCY: CONTINUOUS

## 2023-12-11 ASSESSMENT — PAIN DESCRIPTION - PAIN TYPE
TYPE: CHRONIC PAIN

## 2023-12-11 ASSESSMENT — PAIN SCALES - WONG BAKER: WONGBAKER_NUMERICALRESPONSE: 0

## 2023-12-11 ASSESSMENT — PAIN DESCRIPTION - DESCRIPTORS
DESCRIPTORS: ACHING

## 2023-12-11 ASSESSMENT — PAIN - FUNCTIONAL ASSESSMENT
PAIN_FUNCTIONAL_ASSESSMENT: PREVENTS OR INTERFERES SOME ACTIVE ACTIVITIES AND ADLS

## 2023-12-11 NOTE — DISCHARGE INSTR - COC
Yazmin Wisdom RN on 12/11/23 at 4:27 PM EST    CASE MANAGEMENT/SOCIAL WORK SECTION    Inpatient Status Date: 12/06/2023    Readmission Risk Assessment Score: 32%  Readmission Risk              Risk of Unplanned Readmission:  42           Discharging to Facility/ [facility]  Tony Walton, [address]  Phone: [phone]  Fax: [phone]          / signature: Electronically signed by GABY Rhodes on 12/11/2023 at 4:18 PM      PHYSICIAN SECTION    Prognosis: Guarded    Condition at Discharge: Stable    Rehab Potential (if transferring to Rehab): Good    Recommended Labs or Other Treatments After Discharge: ***    Physician Certification: I certify the above information and transfer of Salvador Chance  is necessary for the continuing treatment of the diagnosis listed and that she requires [address] for greater 30 days.      Update Admission H&P: No change in H&P    PHYSICIAN SIGNATURE:  Electronically signed by Sheila Tiwari MD on 12/11/23 at 8:08 AM EST

## 2023-12-11 NOTE — DIALYSIS
Treatment time: 3 hours  Net UF: 2000 ml     Pre weight: 100.5 kg  Post weight:98.5 kg      Access used: R TDC    Access function: well with  ml/min     Medications or blood products given: none      Regular outpatient schedule: MWF      Summary of response to treatment: Patient tolerated treatment well and without any complications. Patient remained stable throughout entire treatment and upon exiting the dialysis suite via transport. Report given to Belinda Mohs, RN and copy of dialysis treatment record placed in chart, to be scanned into EMR.

## 2023-12-11 NOTE — CARE COORDINATION
12/07/23 1401   Readmission Assessment   Number of Days since last admission? 8-30 days   Previous Disposition Long Term Care   Who is being Interviewed Patient   What was the patient's/caregiver's perception as to why they think they needed to return back to the hospital? Other (Comment)  (SOB and missed dialysis)   Did you visit your Primary Care Physician after you left the hospital, before you returned this time? No   Why weren't you able to visit your PCP? Did not have an appointment   Did you see a specialist, such as Cardiac, Pulmonary, Orthopedic Physician, etc. after you left the hospital? No   Who advised the patient to return to the hospital? Skilled Unit   Does the patient report anything that got in the way of taking their medications? No   In our efforts to provide the best possible care to you and others like you, can you think of anything that we could have done to help you after you left the hospital the first time, so that you might not have needed to return so soon?  Other (Comment)  (N/A)           Electronically signed by GABY Francisco on 12/7/2023 at 2:02 PM
12/11/23 1619   IMM Letter   IMM Letter given to Patient/Family/Significant other/Guardian/POA/by: IMM Letter given to the Patient by SW. Patient signed IMM Letter.    IMM Letter date given: 12/11/23   IMM Letter time given: 0400         Electronically signed by GABY Rhodes on 12/11/2023 at 4:19 PM
Case Management -  Discharge Note      Patient Name: Lorin Nichols                   YOB: 1955            Readmission Risk (Low < 19, Mod (19-27), High > 27): Readmission Risk Score: 32    Current PCP: Effie Dan MD    ([facility]) Important Message from Medicare:    Date: 12/11/2023    PT AM-PAC: 8 /24  OT AM-PAC: 14 /24    Patient noted to have a discharge order. Pt has been medically cleared to return to LTC ([address])    [facility] [address]  Phone: [phone]  Fax: [phone]       Pre-cert Required/obtained: N/A LTC  Transportation scheduled for 10:00AM Tuesday 12/12/2023  Transportation provided by [employer]  ([phone])    AVS faxed and agency notified: Yes and spoke with Alma Martino in admissions at [facility] and notified her pt will need skilled therapy when she returns. Family Notified: Patient is alert and advised she will reach out to family. She has a cell phone with her. Nurse to call report to facility [phone]      Financial    Payor: [employer] [address] / Plan: [employer] [place] ADVANTAGE PREMIUM PPO / Product Type: *No Product type* /     Pharmacy:  Potential assistance Purchasing Medications: No  Meds-to-Beds request:        [facility] of [id number] [place] [address]  [address] [address]  Phone: [phone] Fax: [phone] [facility], [facility]0 [facility],[address]  Phone: [phone] Fax: [phone]      Notes: Additional Case Management Notes: All CM needs met.        Electronically signed by GABY Esparza on 12/11/2023 at 4:30 PM
Discharge Planning:     (CM) Patient has D/C order in.     CM spoke with Patient's Nurse, Norma Castellon who advised that Patient needs HD prior to D/C and that she spoke with HD staff who advised they are backed up and Pt has COVID so they will be one of the last Patients to get done. CM team waiting on Patient to start HD prior to scheduling transport due to not knowing the start time to plan for transport. CM spoke with Patient today in length about moving facilities. CM advised she has [facility] and explained that [facility] is unable to accept and that [facility] advised that they will continue working on looking for placement options for the Patient. CM explained that Patient will have to D/C back to [facility] while [facility] and [address] [place] [address] continue to look at placement for Patient. She also CM to reach out to [facility] in [place]. CM advised she would. CM called [facility] [phone] and spoke with admissions staff, Simeon Sawant. She advised they do not have any skilled beds open at this time and there LTC is on a wait list but that CM could provide the Patient with her information for Patient to follow up on. CM provided Patient with Luh's information from [facility] and advised her to reach out to discuss future placement options. CM called Thea Isaac with [facility] and discussed Pt being unhappy at facility and wanting to move. Thea Isaac advised she would come see Patient and ensure they will continue working on other placement options for the Patient. CM team to follow.        Electronically signed by GABY Chavez on 12/11/2023 at 2:49 PM
Discharge Planning:     (CM) rec'd a call from Abbi Worrell with [employer] [phone] who advised her DON was reviewing Patient's and read Patients admit note about her dialysis being missed due to transportation. Abbi Later advised that was not true and that transport had come for Patient, waited over an hour for her and she refused to go. Abbi Later advised Patient is LTC with them and can return with no pre-cert needed. CM team to follow.        Electronically signed by GABY Faye on 12/7/2023 at 9:06 AM
Discharge Planning:     (IVETT) rec'd a VM from Roderick Bui with [facility] who advised that she had found a few options for the Patient in the area she wanted up [facility] one of those [address] [facility] advised she called Patient numerous time and left messages with no call back. Siddhartha Cheatham advised Bette's number is [phone] and for CM to reach out to her. IVETT called Sharon Osuna [phone] and LVM advising she was calling about this Patient and possible placement. CM provided call back information. Electronically signed by GABY Tim on 12/8/2023 at 11:58 AM      UPDATE AT 12:00:    IVETT rec'd a call back from [facility] who advised she has been working on possibly taking this Patient and has been working with someone name Gilberto Rollins (sp and unsure of title) at [facility]. Bette advised the communication has been poor and she has been unable to get PT/OT notes about how she transfers and then she would also need to work out Dialysis, transport company wheelchair transport before they could accept the Patient and she is unsure if they could make it happen or not but she is willing to work on it. Bette asked if CM could get PT/OT to see the Patient here and then Sharon Osuna could review those notes. They can not take the Patient if she is SNF level as they do not accept her insurance for SNF but they can accept for LTC. Patient also has to be able to transfer herself out of her wheelchair. IVETT advised she would see if she can get PT/OT to see Patient. IVETT asked she would also reach out to [facility] and see if they can reach out to Sharon Osuna and provide the additional information she needs. IVETT perfect served Dr. Barrera Christopher and asked him to please place PT/OT orders.      IVETT called Elvin Miller with [facility] who advised there  who could have been assisting with this had a massive stroke
return    Financial    Payor: [employer] MED[employer] ADVANTAGE / Plan: [employer] ADVANTAGE PREMIUM PPO / Product Type: *No Product type* /     Does insurance require precert for SNF: No    Potential assistance Purchasing Medications: No  Meds-to-Beds request:        [facility] of [id number] [address], [address] 1100 [facility] 215 [facility]  150 [facility] 308 [facility]  Phone: [phone] Fax: [phone] [facility], 1830 [facility],[address] [phone] Calixto Alston [phone]  [address] [address]  Phone: [phone] Fax: [phone]      Notes:    Factors facilitating achievement of predicted outcomes: Family support, Motivated, Cooperative, Pleasant, and Sense of humor      Additional Case Management Notes: CM spoke with Patient. She advised she has lived at [facility] since April. Patient is wanting to switch LTC facilities and she advised she has been in touch with [facility] and they are trying to get her to [facility]. Patient advised that she did not refuse to go to Dialysis but rather her nurse, Shari was rushing her to take her medications and Patient advised she can only take one pill at a time and by the time she was done, transportation had left. CM explained that she can not change LTC facilities but that she would also reach out to [facility] on Patient's behalf. CM called Kyle Nunez with [facility] [phone] and [facility] with the Patient's name and asked for a call back. CM team to follow. The Plan for Transition of Care is related to the following treatment goals of Acute pulmonary edema (HCC) [J81.0]  End stage renal disease on dialysis ([address]) [N18.6, Z99.2]  Pneumonia due to COVID-19 virus [U07.1, J12.82]  COVID-19 [O49.9]    IF APPLICABLE: The Patient and/or patient representative Merlin Brownie and her family were provided with a choice of provider and agrees with the discharge plan.  [employer] of

## 2023-12-12 VITALS
RESPIRATION RATE: 18 BRPM | DIASTOLIC BLOOD PRESSURE: 85 MMHG | SYSTOLIC BLOOD PRESSURE: 150 MMHG | TEMPERATURE: 98.1 F | HEART RATE: 70 BPM | BODY MASS INDEX: 35.02 KG/M2 | HEIGHT: 67 IN | OXYGEN SATURATION: 99 % | WEIGHT: 223.1 LBS

## 2023-12-12 LAB
ANION GAP SERPL CALCULATED.3IONS-SCNC: 11 MMOL/L (ref 3–16)
BASOPHILS # BLD: 0 K/UL (ref 0–0.2)
BASOPHILS NFR BLD: 0.1 %
BUN SERPL-MCNC: 45 MG/DL (ref 7–20)
CALCIUM SERPL-MCNC: 8.7 MG/DL (ref 8.3–10.6)
CHLORIDE SERPL-SCNC: 99 MMOL/L (ref 99–110)
CO2 SERPL-SCNC: 23 MMOL/L (ref 21–32)
CREAT SERPL-MCNC: 4.4 MG/DL (ref 0.6–1.2)
DEPRECATED RDW RBC AUTO: 17 % (ref 12.4–15.4)
EOSINOPHIL # BLD: 0 K/UL (ref 0–0.6)
EOSINOPHIL NFR BLD: 0.9 %
GFR SERPLBLD CREATININE-BSD FMLA CKD-EPI: 10 ML/MIN/{1.73_M2}
GLUCOSE BLD-MCNC: 156 MG/DL (ref 70–99)
GLUCOSE SERPL-MCNC: 222 MG/DL (ref 70–99)
HCT VFR BLD AUTO: 28.3 % (ref 36–48)
HGB BLD-MCNC: 8.9 G/DL (ref 12–16)
LYMPHOCYTES # BLD: 0.5 K/UL (ref 1–5.1)
LYMPHOCYTES NFR BLD: 10 %
MCH RBC QN AUTO: 31.4 PG (ref 26–34)
MCHC RBC AUTO-ENTMCNC: 31.4 G/DL (ref 31–36)
MCV RBC AUTO: 100.2 FL (ref 80–100)
MONOCYTES # BLD: 0.2 K/UL (ref 0–1.3)
MONOCYTES NFR BLD: 4.1 %
NEUTROPHILS # BLD: 4.3 K/UL (ref 1.7–7.7)
NEUTROPHILS NFR BLD: 84.9 %
PERFORMED ON: ABNORMAL
PLATELET # BLD AUTO: 137 K/UL (ref 135–450)
PMV BLD AUTO: 7.9 FL (ref 5–10.5)
POTASSIUM SERPL-SCNC: 4.1 MMOL/L (ref 3.5–5.1)
RBC # BLD AUTO: 2.82 M/UL (ref 4–5.2)
SODIUM SERPL-SCNC: 133 MMOL/L (ref 136–145)
WBC # BLD AUTO: 5 K/UL (ref 4–11)

## 2023-12-12 PROCEDURE — 2580000003 HC RX 258: Performed by: INTERNAL MEDICINE

## 2023-12-12 PROCEDURE — 85025 COMPLETE CBC W/AUTO DIFF WBC: CPT

## 2023-12-12 PROCEDURE — 94640 AIRWAY INHALATION TREATMENT: CPT

## 2023-12-12 PROCEDURE — 6370000000 HC RX 637 (ALT 250 FOR IP): Performed by: INTERNAL MEDICINE

## 2023-12-12 PROCEDURE — 36415 COLL VENOUS BLD VENIPUNCTURE: CPT

## 2023-12-12 PROCEDURE — 80048 BASIC METABOLIC PNL TOTAL CA: CPT

## 2023-12-12 RX ADMIN — LEVOTHYROXINE SODIUM 100 MCG: 0.1 TABLET ORAL at 06:44

## 2023-12-12 RX ADMIN — FUROSEMIDE 80 MG: 40 TABLET ORAL at 09:19

## 2023-12-12 RX ADMIN — METOPROLOL TARTRATE 37.5 MG: 25 TABLET, FILM COATED ORAL at 09:19

## 2023-12-12 RX ADMIN — PANTOPRAZOLE SODIUM 40 MG: 40 TABLET, DELAYED RELEASE ORAL at 06:44

## 2023-12-12 RX ADMIN — OXYCODONE AND ACETAMINOPHEN 1 TABLET: 5; 325 TABLET ORAL at 09:19

## 2023-12-12 RX ADMIN — LACTULOSE 10 G: 20 SOLUTION ORAL at 09:18

## 2023-12-12 RX ADMIN — Medication 10 ML: at 09:19

## 2023-12-12 RX ADMIN — FERROUS SULFATE TAB 325 MG (65 MG ELEMENTAL FE) 325 MG: 325 (65 FE) TAB at 09:19

## 2023-12-12 RX ADMIN — MOMETASONE FUROATE AND FORMOTEROL FUMARATE DIHYDRATE 2 PUFF: 100; 5 AEROSOL RESPIRATORY (INHALATION) at 09:24

## 2023-12-12 RX ADMIN — METHOCARBAMOL 500 MG: 500 TABLET ORAL at 09:19

## 2023-12-12 RX ADMIN — ALLOPURINOL 300 MG: 300 TABLET ORAL at 09:19

## 2023-12-12 RX ADMIN — Medication 2 PUFF: at 09:23

## 2023-12-12 RX ADMIN — APIXABAN 5 MG: 5 TABLET, FILM COATED ORAL at 09:19

## 2023-12-12 ASSESSMENT — PAIN SCALES - GENERAL: PAINLEVEL_OUTOF10: 0

## 2023-12-12 NOTE — PLAN OF CARE
Problem: Discharge Planning  Goal: Discharge to home or other facility with appropriate resources  12/12/2023 1042 by Tyler Patel RN  Outcome: Progressing     Problem: Pain  Goal: Verbalizes/displays adequate comfort level or baseline comfort level  12/12/2023 1042 by Tyler Patel RN  Outcome: Progressing     Problem: Skin/Tissue Integrity  Goal: Absence of new skin breakdown  Description: 1. Monitor for areas of redness and/or skin breakdown  2. Assess vascular access sites hourly  3. Every 4-6 hours minimum:  Change oxygen saturation probe site  4. Every 4-6 hours:  If on nasal continuous positive airway pressure, respiratory therapy assess nares and determine need for appliance change or resting period.   12/12/2023 1042 by Tyler Patel RN  Outcome: Progressing     Problem: ABCDS Injury Assessment  Goal: Absence of physical injury  12/12/2023 1042 by Tyler Patel RN  Outcome: Progressing     Problem: Safety - Adult  Goal: Free from fall injury  12/12/2023 1042 by Tyler Patel RN  Outcome: Progressing     Problem: Chronic Conditions and Co-morbidities  Goal: Patient's chronic conditions and co-morbidity symptoms are monitored and maintained or improved  12/12/2023 1042 by Tyler Patel RN  Outcome: Progressing     Problem: Hematologic - Adult  Goal: Maintains hematologic stability  12/12/2023 1042 by Tyler Patel RN  Outcome: Progressing

## 2023-12-12 NOTE — PLAN OF CARE
Problem: Discharge Planning  Goal: Discharge to home or other facility with appropriate resources  Outcome: Progressing  Flowsheets (Taken 12/11/2023 2030)  Discharge to home or other facility with appropriate resources: Identify barriers to discharge with patient and caregiver     Problem: Pain  Goal: Verbalizes/displays adequate comfort level or baseline comfort level  Outcome: Progressing     Problem: Skin/Tissue Integrity  Goal: Absence of new skin breakdown  Description: 1. Monitor for areas of redness and/or skin breakdown  2. Assess vascular access sites hourly  3. Every 4-6 hours minimum:  Change oxygen saturation probe site  4. Every 4-6 hours:  If on nasal continuous positive airway pressure, respiratory therapy assess nares and determine need for appliance change or resting period.   Outcome: Progressing     Problem: ABCDS Injury Assessment  Goal: Absence of physical injury  Outcome: Progressing     Problem: Safety - Adult  Goal: Free from fall injury  Outcome: Progressing     Problem: Chronic Conditions and Co-morbidities  Goal: Patient's chronic conditions and co-morbidity symptoms are monitored and maintained or improved  Outcome: Progressing  Flowsheets (Taken 12/11/2023 2030)  Care Plan - Patient's Chronic Conditions and Co-Morbidity Symptoms are Monitored and Maintained or Improved: Monitor and assess patient's chronic conditions and comorbid symptoms for stability, deterioration, or improvement     Problem: Hematologic - Adult  Goal: Maintains hematologic stability  Outcome: Progressing

## 2024-02-20 ENCOUNTER — HOSPITAL ENCOUNTER (EMERGENCY)
Age: 69
Discharge: HOME | End: 2024-02-20
Attending: EMERGENCY MEDICINE
Payer: COMMERCIAL

## 2024-02-20 ENCOUNTER — APPOINTMENT (OUTPATIENT)
Age: 69
End: 2024-02-20
Payer: COMMERCIAL

## 2024-02-20 VITALS
SYSTOLIC BLOOD PRESSURE: 140 MMHG | TEMPERATURE: 98 F | WEIGHT: 275 LBS | HEIGHT: 67 IN | HEART RATE: 76 BPM | DIASTOLIC BLOOD PRESSURE: 71 MMHG | OXYGEN SATURATION: 98 % | RESPIRATION RATE: 18 BRPM | BODY MASS INDEX: 43.16 KG/M2

## 2024-02-20 DIAGNOSIS — M75.101 RIGHT ROTATOR CUFF TEAR ARTHROPATHY: Primary | ICD-10-CM

## 2024-02-20 DIAGNOSIS — M12.811 RIGHT ROTATOR CUFF TEAR ARTHROPATHY: Primary | ICD-10-CM

## 2024-02-20 DIAGNOSIS — G89.29 CHRONIC RIGHT SHOULDER PAIN: ICD-10-CM

## 2024-02-20 DIAGNOSIS — M25.511 CHRONIC RIGHT SHOULDER PAIN: ICD-10-CM

## 2024-02-20 PROCEDURE — 6360000002 HC RX W HCPCS: Performed by: EMERGENCY MEDICINE

## 2024-02-20 PROCEDURE — 96372 THER/PROPH/DIAG INJ SC/IM: CPT

## 2024-02-20 PROCEDURE — 73030 X-RAY EXAM OF SHOULDER: CPT

## 2024-02-20 PROCEDURE — 99284 EMERGENCY DEPT VISIT MOD MDM: CPT

## 2024-02-20 PROCEDURE — 6370000000 HC RX 637 (ALT 250 FOR IP): Performed by: EMERGENCY MEDICINE

## 2024-02-20 PROCEDURE — 93971 EXTREMITY STUDY: CPT

## 2024-02-20 RX ORDER — METHOCARBAMOL 500 MG/1
1000 TABLET, FILM COATED ORAL
Status: COMPLETED | OUTPATIENT
Start: 2024-02-20 | End: 2024-02-20

## 2024-02-20 RX ORDER — OXYCODONE HYDROCHLORIDE AND ACETAMINOPHEN 5; 325 MG/1; MG/1
1 TABLET ORAL ONCE
Status: COMPLETED | OUTPATIENT
Start: 2024-02-20 | End: 2024-02-20

## 2024-02-20 RX ADMIN — OXYCODONE HYDROCHLORIDE AND ACETAMINOPHEN 1 TABLET: 5; 325 TABLET ORAL at 10:56

## 2024-02-20 RX ADMIN — METHOCARBAMOL TABLETS 1000 MG: 500 TABLET, COATED ORAL at 11:34

## 2024-02-20 RX ADMIN — HYDROMORPHONE HYDROCHLORIDE 1 MG: 1 INJECTION, SOLUTION INTRAMUSCULAR; INTRAVENOUS; SUBCUTANEOUS at 12:02

## 2024-02-20 ASSESSMENT — PAIN DESCRIPTION - LOCATION
LOCATION: ARM

## 2024-02-20 ASSESSMENT — PAIN DESCRIPTION - ORIENTATION
ORIENTATION: RIGHT

## 2024-02-20 ASSESSMENT — PAIN DESCRIPTION - DESCRIPTORS
DESCRIPTORS: DISCOMFORT

## 2024-02-20 ASSESSMENT — PAIN SCALES - GENERAL
PAINLEVEL_OUTOF10: 10
PAINLEVEL_OUTOF10: 9
PAINLEVEL_OUTOF10: 8
PAINLEVEL_OUTOF10: 8
PAINLEVEL_OUTOF10: 9
PAINLEVEL_OUTOF10: 8
PAINLEVEL_OUTOF10: 8
PAINLEVEL_OUTOF10: 9

## 2024-02-20 ASSESSMENT — PAIN DESCRIPTION - FREQUENCY: FREQUENCY: CONTINUOUS

## 2024-02-20 ASSESSMENT — PAIN DESCRIPTION - PAIN TYPE
TYPE: ACUTE PAIN

## 2024-02-20 ASSESSMENT — PAIN - FUNCTIONAL ASSESSMENT: PAIN_FUNCTIONAL_ASSESSMENT: 0-10

## 2024-02-20 ASSESSMENT — PAIN DESCRIPTION - ONSET: ONSET: PROGRESSIVE

## 2024-02-20 NOTE — ED NOTES
Patient refused to put sling on that she arrived with. Patient states it is causing worsening pain. Sling pit in belongings bag and sent with EMS.

## 2024-02-20 NOTE — ED PROVIDER NOTES
68-year-old female from a local [facility] who suffers chronic shoulder pain.  I performed an extensive review of her electronic medical record and in February of this year she had MRI of the right shoulder showing a complete tear of her rotator cuff.  She has as of yet not had orthopedic surgery evaluation and she requested a referral to on-call orthopedic surgery from our hospital and I will provide that.  She has already been prescribed and fitted for a sling and has analgesics at her ECF she is to continue those.  Today out of an abundance of caution because she did have some edema to her upper extremity I did do an ultrasound which shows no DVT today.       Patient was given the following medications:   Medications   oxyCODONE-acetaminophen (PERCOCET) 5-325 MG per tablet 1 tablet (1 tablet Oral Given 2/20/24 1056)   methocarbamol (ROBAXIN) tablet 1,000 mg (1,000 mg Oral Given 2/20/24 1134)   HYDROmorphone (DILAUDID) injection 1 mg (1 mg IntraMUSCular Given 2/20/24 1202)        CONSULTS:   None   Discussion with Other Professionals: None   {Social Determinants: None   Chronic Conditions:  Multiple   Records Reviewed: MRI right shoulder 2/6/2024 obtained via care everywhere in epic showed moderate to severe glenohumeral and moderate acromioclavicular joint degenerative disease.  Multifocal intramuscular edema.  Proximal biceps tendon not definitively visualized.  Postoperative change of rotator cuff with findings suggestive of a read tear.      Disposition Considerations: Discharged to [facility] with outpatient orthopedic follow-up  I am the Primary Clinician of Record.        FINAL IMPRESSION    1. Right rotator cuff tear arthropathy    2. Chronic right shoulder pain           DISPOSITION/PLAN:     Discharged to F, continue pain medication previously prescribed, continue to wear sling and follow-up with orthopedic surgery outpatient.        Pt discharged home in stable condition.     PATIENT REFERRED TO:   Dhaval Bates,

## 2024-02-20 NOTE — ED NOTES
Pt and pt belongings transferred onto EMS cot. Pt secured and discharged. Patient awake and alert and still having arm pain. VSS.

## 2024-02-20 NOTE — ED NOTES
Pt resting in bed with call light in reach and room lights dimmed for comfort. Pt provided with cool wash cloth to the forehead d/t pt c/o feeling hot and flushed. Pt educated on repositioning controls on the bed for self comfort. Pt educated on plan of care. Pt denies any further needs at this time. Plan of care ongoing.

## 2024-02-20 NOTE — ED NOTES
Called xray to inquire about US number. They stated US is at the  registration and they will let US know about order.

## 2024-02-20 NOTE — DISCHARGE INSTRUCTIONS
Continue all previously prescribed pain medications prescribed by the physician at [facility]

## 2024-02-20 NOTE — ED NOTES
Patient tearful and complains of right arm pain and swelling. Requesting shirt off. Assisted with shirt and compared both arms and right arm and right hand is edematous. Dr. Rae aware and is going to order an US.

## 2024-02-20 NOTE — ED NOTES
Received a call from [facility] that they will be here to pick patient up within the hour.

## 2024-02-20 NOTE — ED NOTES
Upon assessment there is significant tenderness noted to the right shoulder and bicep area. Pt reports tingling in right hand and fingers but denies numbness. Pt reports taking Robaxin and Percocet for pain this morning around 0500, with relief but reports the pain came back and is worse. No obvious deformity to the site. Skin warm, dry and intact. Pt noted to be in right arm sling upon arrival.

## 2024-02-20 NOTE — ED NOTES
Report given to Floyd singh at [facility] and nursing [facility] ([phone]). Roundtrip booked to get an ambulance back to facility, no ETA as of yet on pickup.

## 2024-04-26 ENCOUNTER — TELEPHONE (OUTPATIENT)
Dept: VASCULAR SURGERY | Age: 69
End: 2024-04-26

## 2024-04-26 NOTE — TELEPHONE ENCOUNTER
Called patient to r/s consult with Dr. Rich for HD access. She advised she will be seeing a different physician closer to [facility].

## 2024-07-16 ENCOUNTER — APPOINTMENT (OUTPATIENT)
Age: 69
End: 2024-07-16
Payer: MEDICARE

## 2024-07-16 ENCOUNTER — HOSPITAL ENCOUNTER (EMERGENCY)
Age: 69
Discharge: HOME | End: 2024-07-16
Attending: EMERGENCY MEDICINE
Payer: MEDICARE

## 2024-07-16 VITALS
OXYGEN SATURATION: 100 % | TEMPERATURE: 97.8 F | SYSTOLIC BLOOD PRESSURE: 124 MMHG | WEIGHT: 224 LBS | RESPIRATION RATE: 16 BRPM | HEIGHT: 67 IN | DIASTOLIC BLOOD PRESSURE: 65 MMHG | HEART RATE: 70 BPM | BODY MASS INDEX: 35.16 KG/M2

## 2024-07-16 DIAGNOSIS — R53.83 MALAISE AND FATIGUE: Primary | ICD-10-CM

## 2024-07-16 DIAGNOSIS — R93.89 ABNORMAL CXR: ICD-10-CM

## 2024-07-16 DIAGNOSIS — R53.81 MALAISE AND FATIGUE: Primary | ICD-10-CM

## 2024-07-16 LAB
ANION GAP SERPL CALCULATED.3IONS-SCNC: 13 MMOL/L (ref 3–16)
BASOPHILS # BLD: 0.02 K/UL (ref 0–0.2)
BASOPHILS NFR BLD: 0 %
BUN SERPL-MCNC: 33 MG/DL (ref 7–20)
CALCIUM SERPL-MCNC: 8.8 MG/DL (ref 8.3–10.6)
CHLORIDE SERPL-SCNC: 97 MMOL/L (ref 99–110)
CO2 SERPL-SCNC: 27 MMOL/L (ref 21–32)
CREAT SERPL-MCNC: 3.2 MG/DL (ref 0.6–1.2)
EKG ATRIAL RATE: 76 BPM
EKG DIAGNOSIS: NORMAL
EKG Q-T INTERVAL: 466 MS
EKG QRS DURATION: 182 MS
EKG QTC CALCULATION (BAZETT): 524 MS
EKG R AXIS: -73 DEGREES
EKG T AXIS: 101 DEGREES
EKG VENTRICULAR RATE: 76 BPM
EOSINOPHIL # BLD: 0.27 K/UL (ref 0–0.6)
EOSINOPHILS RELATIVE PERCENT: 5 %
ERYTHROCYTE [DISTWIDTH] IN BLOOD BY AUTOMATED COUNT: 17 % (ref 12.4–15.4)
GFR, ESTIMATED: 15 ML/MIN/1.73M2
GLUCOSE SERPL-MCNC: 130 MG/DL (ref 70–99)
HCT VFR BLD AUTO: 29.7 % (ref 36–48)
HGB BLD-MCNC: 9.3 G/DL (ref 12–16)
IMM GRANULOCYTES # BLD AUTO: 0 K/UL (ref 0–0.5)
IMM GRANULOCYTES NFR BLD: 0 %
LYMPHOCYTES NFR BLD: 0.84 K/UL (ref 1–5.1)
LYMPHOCYTES RELATIVE PERCENT: 15 %
MCH RBC QN AUTO: 31.6 PG (ref 26–34)
MCHC RBC AUTO-ENTMCNC: 31.3 G/DL (ref 31–36)
MCV RBC AUTO: 101 FL (ref 80–100)
MONOCYTES NFR BLD: 0.47 K/UL (ref 0–1.3)
MONOCYTES NFR BLD: 8 %
NEUTROPHILS NFR BLD: 72 %
NEUTS SEG NFR BLD: 4.02 K/UL (ref 1.7–7.7)
PLATELET # BLD AUTO: 132 K/UL (ref 135–450)
PMV BLD AUTO: 10.8 FL
POTASSIUM SERPL-SCNC: 3.6 MMOL/L (ref 3.5–5.1)
RBC # BLD AUTO: 2.94 M/UL (ref 4–5.2)
SODIUM SERPL-SCNC: 136 MMOL/L (ref 136–145)
WBC OTHER # BLD: 5.6 K/UL (ref 4–11)

## 2024-07-16 PROCEDURE — 71045 X-RAY EXAM CHEST 1 VIEW: CPT

## 2024-07-16 PROCEDURE — 85025 COMPLETE CBC W/AUTO DIFF WBC: CPT

## 2024-07-16 PROCEDURE — 99285 EMERGENCY DEPT VISIT HI MDM: CPT

## 2024-07-16 PROCEDURE — 80048 BASIC METABOLIC PNL TOTAL CA: CPT

## 2024-07-16 PROCEDURE — 93005 ELECTROCARDIOGRAM TRACING: CPT

## 2024-07-16 PROCEDURE — 70450 CT HEAD/BRAIN W/O DYE: CPT

## 2024-07-16 ASSESSMENT — PAIN DESCRIPTION - LOCATION: LOCATION: LEG

## 2024-07-16 ASSESSMENT — PAIN DESCRIPTION - ORIENTATION: ORIENTATION: RIGHT;LEFT

## 2024-07-16 ASSESSMENT — PAIN - FUNCTIONAL ASSESSMENT: PAIN_FUNCTIONAL_ASSESSMENT: 0-10

## 2024-07-16 ASSESSMENT — PAIN DESCRIPTION - DESCRIPTORS: DESCRIPTORS: CRAMPING

## 2024-07-16 ASSESSMENT — PAIN SCALES - GENERAL: PAINLEVEL_OUTOF10: 7

## 2024-07-16 NOTE — ED NOTES
Pt being transported back to [facility] facility via [facility]. Pt received discharge instructions and report called to [facility].

## 2024-07-16 NOTE — ED NOTES
This writer received report from ZACK Vega and will be continuing pt care. This writer introduced self to pt. Pt respirations even and unlabored without signs of distress noted. Call light within reach. Pt denies any needs at this time.

## 2024-07-16 NOTE — ED PROVIDER NOTES
is at baseline.      GCS: GCS eye subscore is 4. GCS verbal subscore is 5. GCS motor subscore is 6.           DIAGNOSTIC RESULTS     LABS:   Labs Reviewed   CBC WITH AUTO DIFFERENTIAL - Abnormal; Notable for the following components:       Result Value    RBC 2.94 (*)     Hemoglobin 9.3 (*)     Hematocrit 29.7 (*)     .0 (*)     RDW 17.0 (*)     Platelets 132 (*)     Lymphocytes Absolute 0.84 (*)     All other components within normal limits   BASIC METABOLIC PANEL W/ REFLEX TO MG FOR LOW K - Abnormal; Notable for the following components:    Chloride 97 (*)     Glucose 130 (*)     BUN 33 (*)     Creatinine 3.2 (*)     Est, Glom Filt Rate 15 (*)     All other components within normal limits      When ordered only abnormal lab results are displayed. All other labs were within normal range or not returned as of this dictation.     RADIOLOGY:      Non-plain film images such as CT, Ultrasound and MRI are read by the radiologist. Plain radiographic images are visualized and preliminarily interpreted by the ED Provider with the below findings:   Interpretation per the Radiologist below, if available at the time of this note:  CT Head W/O Contrast   Final Result   1.  No acute intracranial hemorrhage or mass effect.      Electronically signed by Alfonso Mccarthy      XR CHEST PORTABLE   Final Result   Right basilar opacity, atelectasis versus pneumonia, with small to moderate   right pleural effusion.      Electronically signed by Dutch Dyson               EKG: EKG Interpretation    Interpreted by emergency department physician    Rhythm: paced  Rate: normal    Clinical Impression: paced rhythm    Syd Rae MD       PROCEDURES none  Unless otherwise noted below, none     CRITICAL CARE TIME   I personally saw the patient and independently provided 0 minutes of non-concurrent critical care out of the total shared critical care time excluding separately billable procedures.        Vitals:    Vitals:    07/16/24 0930

## 2024-07-16 NOTE — ED NOTES
Request placed in roundtrip for pt transport back to her facility, awaiting transport time. Pt updated at this time.

## 2025-05-09 NOTE — CARE COORDINATION
11/09/23 1637   IMM Letter   IMM Letter given to Patient/Family/Significant other/Guardian/POA/by: Given to Patient by .    IMM Letter date given: 11/09/23   IMM Letter time given: 1634     Electronically signed by GABY Bradshaw on 11/9/2023 at 4:37 PM Seen 4/7/2025,future appointment scheduled 8/7/2025

## 2025-07-12 NOTE — PLAN OF CARE
Problem: Discharge Planning  Goal: Discharge to home or other facility with appropriate resources  10/2/2023 0717 by Ana Garcia RN  Outcome: Progressing  10/2/2023 0029 by Brook Shields RN  Outcome: Progressing     Problem: Pain  Goal: Verbalizes/displays adequate comfort level or baseline comfort level  10/2/2023 0717 by Ana Garcia RN  Outcome: Progressing  10/2/2023 0029 by Brook Shields RN  Outcome: Progressing     Problem: Safety - Adult  Goal: Free from fall injury  10/2/2023 0717 by Ana Garcia RN  Outcome: Progressing  Flowsheets (Taken 10/2/2023 0716)  Free From Fall Injury: Instruct family/caregiver on patient safety  10/2/2023 0029 by Brook Shields RN  Outcome: Progressing
Problem: Discharge Planning  Goal: Discharge to home or other facility with appropriate resources  Outcome: Progressing     Problem: Pain  Goal: Verbalizes/displays adequate comfort level or baseline comfort level  9/29/2023 2316 by Rafael Garza RN  Outcome: Progressing  9/29/2023 1430 by Camilo Salmon RN  Outcome: Progressing     Problem: Skin/Tissue Integrity  Goal: Absence of new skin breakdown  Description: 1. Monitor for areas of redness and/or skin breakdown  2. Assess vascular access sites hourly  3. Every 4-6 hours minimum:  Change oxygen saturation probe site  4. Every 4-6 hours:  If on nasal continuous positive airway pressure, respiratory therapy assess nares and determine need for appliance change or resting period.   9/29/2023 2316 by Rafael Garza RN  Outcome: Progressing  9/29/2023 1430 by Camilo Salmon RN  Outcome: Progressing     Problem: Safety - Adult  Goal: Free from fall injury  9/29/2023 2316 by Rafael Garza RN  Outcome: Progressing  9/29/2023 1430 by Camilo Salmon RN  Outcome: Progressing     Problem: Nutrition Deficit:  Goal: Optimize nutritional status  9/29/2023 2316 by Rafael Garza RN  Outcome: Progressing  9/29/2023 1430 by Camilo Salmon RN  Outcome: Progressing
Problem: Discharge Planning  Goal: Discharge to home or other facility with appropriate resources  Outcome: Progressing     Problem: Pain  Goal: Verbalizes/displays adequate comfort level or baseline comfort level  Outcome: Progressing     Problem: Skin/Tissue Integrity  Goal: Absence of new skin breakdown  Description: 1. Monitor for areas of redness and/or skin breakdown  2. Assess vascular access sites hourly  3. Every 4-6 hours minimum:  Change oxygen saturation probe site  4. Every 4-6 hours:  If on nasal continuous positive airway pressure, respiratory therapy assess nares and determine need for appliance change or resting period.   Outcome: Progressing     Problem: Safety - Adult  Goal: Free from fall injury  Outcome: Progressing
Problem: Discharge Planning  Goal: Discharge to home or other facility with appropriate resources  Outcome: Progressing     Problem: Pain  Goal: Verbalizes/displays adequate comfort level or baseline comfort level  Outcome: Progressing     Problem: Skin/Tissue Integrity  Goal: Absence of new skin breakdown  Description: 1. Monitor for areas of redness and/or skin breakdown  2. Assess vascular access sites hourly  3. Every 4-6 hours minimum:  Change oxygen saturation probe site  4. Every 4-6 hours:  If on nasal continuous positive airway pressure, respiratory therapy assess nares and determine need for appliance change or resting period.   Outcome: Progressing     Problem: Safety - Adult  Goal: Free from fall injury  Outcome: Progressing     Problem: Nutrition Deficit:  Goal: Optimize nutritional status  Outcome: Progressing
Problem: Discharge Planning  Goal: Discharge to home or other facility with appropriate resources  Outcome: Progressing     Problem: Pain  Goal: Verbalizes/displays adequate comfort level or baseline comfort level  Outcome: Progressing     Problem: Skin/Tissue Integrity  Goal: Absence of new skin breakdown  Description: 1. Monitor for areas of redness and/or skin breakdown  2. Assess vascular access sites hourly  3. Every 4-6 hours minimum:  Change oxygen saturation probe site  4. Every 4-6 hours:  If on nasal continuous positive airway pressure, respiratory therapy assess nares and determine need for appliance change or resting period.   Outcome: Progressing     Problem: Safety - Adult  Goal: Free from fall injury  Outcome: Progressing     Problem: Nutrition Deficit:  Goal: Optimize nutritional status  Outcome: Progressing
Problem: Discharge Planning  Goal: Discharge to home or other facility with appropriate resources  Outcome: Progressing     Problem: Pain  Goal: Verbalizes/displays adequate comfort level or baseline comfort level  Outcome: Progressing     Problem: Skin/Tissue Integrity  Goal: Absence of new skin breakdown  Description: 1. Monitor for areas of redness and/or skin breakdown  2. Assess vascular access sites hourly  3. Every 4-6 hours minimum:  Change oxygen saturation probe site  4. Every 4-6 hours:  If on nasal continuous positive airway pressure, respiratory therapy assess nares and determine need for appliance change or resting period.   Outcome: Progressing     Problem: Safety - Adult  Goal: Free from fall injury  Outcome: Progressing  Flowsheets (Taken 9/30/2023 4658)  Free From Fall Injury: Instruct family/caregiver on patient safety     Problem: Nutrition Deficit:  Goal: Optimize nutritional status  Outcome: Progressing
Problem: Pain  Goal: Verbalizes/displays adequate comfort level or baseline comfort level  9/28/2023 1013 by Charbel Charlton RN  Outcome: Progressing  9/28/2023 0417 by Faiza Arguello RN  Outcome: Progressing     Problem: Safety - Adult  Goal: Free from fall injury  9/28/2023 1013 by Charbel Charlton RN  Outcome: Progressing  9/28/2023 0417 by Faiza Arguello RN  Outcome: Progressing     Problem: Skin/Tissue Integrity  Goal: Absence of new skin breakdown  Description: 1. Monitor for areas of redness and/or skin breakdown  2. Assess vascular access sites hourly  3. Every 4-6 hours minimum:  Change oxygen saturation probe site  4. Every 4-6 hours:  If on nasal continuous positive airway pressure, respiratory therapy assess nares and determine need for appliance change or resting period.   9/28/2023 1013 by Charbel Charlton RN  Outcome: Progressing  9/28/2023 0417 by Faiza Arguello RN  Outcome: Progressing
Problem: Pain  Goal: Verbalizes/displays adequate comfort level or baseline comfort level  9/29/2023 1430 by Charbel Charlton RN  Outcome: Progressing  9/29/2023 0316 by Tatiana Owens RN  Outcome: Progressing     Problem: Skin/Tissue Integrity  Goal: Absence of new skin breakdown  Description: 1. Monitor for areas of redness and/or skin breakdown  2. Assess vascular access sites hourly  3. Every 4-6 hours minimum:  Change oxygen saturation probe site  4. Every 4-6 hours:  If on nasal continuous positive airway pressure, respiratory therapy assess nares and determine need for appliance change or resting period.   9/29/2023 1430 by Charbel Charlton RN  Outcome: Progressing  9/29/2023 0316 by Tatiana Owens RN  Outcome: Progressing     Problem: Safety - Adult  Goal: Free from fall injury  9/29/2023 1430 by Charbel Charlton RN  Outcome: Progressing  9/29/2023 0316 by Tatiana Owens RN  Outcome: Progressing
[id number]